# Patient Record
Sex: FEMALE | Race: ASIAN | NOT HISPANIC OR LATINO
[De-identification: names, ages, dates, MRNs, and addresses within clinical notes are randomized per-mention and may not be internally consistent; named-entity substitution may affect disease eponyms.]

---

## 2018-05-03 PROBLEM — Z00.00 ENCOUNTER FOR PREVENTIVE HEALTH EXAMINATION: Status: ACTIVE | Noted: 2018-05-03

## 2018-06-07 ENCOUNTER — NON-APPOINTMENT (OUTPATIENT)
Age: 63
End: 2018-06-07

## 2018-06-07 ENCOUNTER — APPOINTMENT (OUTPATIENT)
Dept: CARDIOLOGY | Facility: CLINIC | Age: 63
End: 2018-06-07
Payer: COMMERCIAL

## 2018-06-07 VITALS
HEIGHT: 64 IN | WEIGHT: 175 LBS | OXYGEN SATURATION: 98 % | BODY MASS INDEX: 29.88 KG/M2 | HEART RATE: 89 BPM | DIASTOLIC BLOOD PRESSURE: 80 MMHG | SYSTOLIC BLOOD PRESSURE: 124 MMHG

## 2018-06-07 DIAGNOSIS — E11.9 TYPE 2 DIABETES MELLITUS W/OUT COMPLICATIONS: ICD-10-CM

## 2018-06-07 DIAGNOSIS — Z82.49 FAMILY HISTORY OF ISCHEMIC HEART DISEASE AND OTHER DISEASES OF THE CIRCULATORY SYSTEM: ICD-10-CM

## 2018-06-07 DIAGNOSIS — I49.9 CARDIAC ARRHYTHMIA, UNSPECIFIED: ICD-10-CM

## 2018-06-07 DIAGNOSIS — R06.02 SHORTNESS OF BREATH: ICD-10-CM

## 2018-06-07 DIAGNOSIS — Z83.3 FAMILY HISTORY OF DIABETES MELLITUS: ICD-10-CM

## 2018-06-07 PROCEDURE — 99204 OFFICE O/P NEW MOD 45 MIN: CPT

## 2018-06-07 PROCEDURE — 93000 ELECTROCARDIOGRAM COMPLETE: CPT

## 2018-06-26 ENCOUNTER — FORM ENCOUNTER (OUTPATIENT)
Age: 63
End: 2018-06-26

## 2018-06-27 ENCOUNTER — OUTPATIENT (OUTPATIENT)
Dept: OUTPATIENT SERVICES | Facility: HOSPITAL | Age: 63
LOS: 1 days | End: 2018-06-27
Payer: COMMERCIAL

## 2018-06-27 DIAGNOSIS — R06.02 SHORTNESS OF BREATH: ICD-10-CM

## 2018-06-27 PROCEDURE — 93306 TTE W/DOPPLER COMPLETE: CPT | Mod: 26

## 2018-06-27 PROCEDURE — 93306 TTE W/DOPPLER COMPLETE: CPT

## 2018-07-16 ENCOUNTER — APPOINTMENT (OUTPATIENT)
Dept: CARDIOLOGY | Facility: CLINIC | Age: 63
End: 2018-07-16
Payer: COMMERCIAL

## 2018-07-16 PROCEDURE — 93015 CV STRESS TEST SUPVJ I&R: CPT

## 2018-07-30 ENCOUNTER — MEDICATION RENEWAL (OUTPATIENT)
Age: 63
End: 2018-07-30

## 2018-10-25 PROBLEM — Z00.00 ENCOUNTER FOR PREVENTIVE HEALTH EXAMINATION: Noted: 2018-10-25

## 2018-11-07 ENCOUNTER — APPOINTMENT (OUTPATIENT)
Dept: SURGERY | Facility: CLINIC | Age: 63
End: 2018-11-07
Payer: MEDICAID

## 2018-11-07 VITALS
HEIGHT: 60 IN | DIASTOLIC BLOOD PRESSURE: 88 MMHG | OXYGEN SATURATION: 97 % | WEIGHT: 120 LBS | RESPIRATION RATE: 16 BRPM | SYSTOLIC BLOOD PRESSURE: 157 MMHG | BODY MASS INDEX: 23.56 KG/M2 | HEART RATE: 87 BPM | TEMPERATURE: 98 F

## 2018-11-07 DIAGNOSIS — I10 ESSENTIAL (PRIMARY) HYPERTENSION: ICD-10-CM

## 2018-11-07 DIAGNOSIS — N32.81 OVERACTIVE BLADDER: ICD-10-CM

## 2018-11-07 DIAGNOSIS — E11.9 TYPE 2 DIABETES MELLITUS W/OUT COMPLICATIONS: ICD-10-CM

## 2018-11-07 DIAGNOSIS — E78.00 PURE HYPERCHOLESTEROLEMIA, UNSPECIFIED: ICD-10-CM

## 2018-11-07 DIAGNOSIS — K21.9 GASTRO-ESOPHAGEAL REFLUX DISEASE W/OUT ESOPHAGITIS: ICD-10-CM

## 2018-11-07 DIAGNOSIS — J45.909 UNSPECIFIED ASTHMA, UNCOMPLICATED: ICD-10-CM

## 2018-11-07 DIAGNOSIS — Z78.9 OTHER SPECIFIED HEALTH STATUS: ICD-10-CM

## 2018-11-07 PROCEDURE — 99204 OFFICE O/P NEW MOD 45 MIN: CPT

## 2018-11-07 RX ORDER — ALBUTEROL 90 MCG
90 AEROSOL (GRAM) INHALATION
Refills: 0 | Status: ACTIVE | COMMUNITY

## 2018-11-07 RX ORDER — ATORVASTATIN CALCIUM 10 MG/1
10 TABLET, FILM COATED ORAL
Refills: 0 | Status: ACTIVE | COMMUNITY

## 2018-11-07 RX ORDER — OXYBUTYNIN CHLORIDE 10 MG/1
10 TABLET, EXTENDED RELEASE ORAL
Refills: 0 | Status: ACTIVE | COMMUNITY

## 2018-11-07 RX ORDER — GLIPIZIDE 10 MG/1
10 TABLET ORAL
Refills: 0 | Status: ACTIVE | COMMUNITY

## 2018-11-07 RX ORDER — FAMOTIDINE 20 MG/1
20 TABLET, FILM COATED ORAL
Refills: 0 | Status: ACTIVE | COMMUNITY

## 2018-11-07 RX ORDER — METOPROLOL SUCCINATE 100 MG/1
100 TABLET, EXTENDED RELEASE ORAL
Refills: 0 | Status: ACTIVE | COMMUNITY

## 2018-11-18 PROBLEM — I10 HYPERTENSION: Status: ACTIVE | Noted: 2018-11-07

## 2018-11-18 PROBLEM — E11.9 DIABETES MELLITUS, TYPE 2: Status: ACTIVE | Noted: 2018-11-07

## 2018-11-18 PROBLEM — K21.9 GERD (GASTROESOPHAGEAL REFLUX DISEASE): Status: ACTIVE | Noted: 2018-11-07

## 2018-11-18 PROBLEM — E78.00 HIGH CHOLESTEROL: Status: ACTIVE | Noted: 2018-11-07

## 2018-12-03 ENCOUNTER — OUTPATIENT (OUTPATIENT)
Dept: OUTPATIENT SERVICES | Facility: HOSPITAL | Age: 63
LOS: 1 days | End: 2018-12-03
Payer: MEDICAID

## 2018-12-03 VITALS
WEIGHT: 126.99 LBS | SYSTOLIC BLOOD PRESSURE: 154 MMHG | HEART RATE: 80 BPM | OXYGEN SATURATION: 97 % | RESPIRATION RATE: 18 BRPM | HEIGHT: 57 IN | DIASTOLIC BLOOD PRESSURE: 92 MMHG | TEMPERATURE: 98 F

## 2018-12-03 DIAGNOSIS — Z01.818 ENCOUNTER FOR OTHER PREPROCEDURAL EXAMINATION: ICD-10-CM

## 2018-12-03 DIAGNOSIS — E11.9 TYPE 2 DIABETES MELLITUS WITHOUT COMPLICATIONS: ICD-10-CM

## 2018-12-03 DIAGNOSIS — K80.20 CALCULUS OF GALLBLADDER WITHOUT CHOLECYSTITIS WITHOUT OBSTRUCTION: ICD-10-CM

## 2018-12-03 DIAGNOSIS — I10 ESSENTIAL (PRIMARY) HYPERTENSION: ICD-10-CM

## 2018-12-03 LAB
ALBUMIN SERPL ELPH-MCNC: 3.9 G/DL — SIGNIFICANT CHANGE UP (ref 3.3–5)
ALP SERPL-CCNC: 59 U/L — SIGNIFICANT CHANGE UP (ref 40–120)
ALT FLD-CCNC: 15 U/L — SIGNIFICANT CHANGE UP (ref 10–45)
ANION GAP SERPL CALC-SCNC: 14 MMOL/L — SIGNIFICANT CHANGE UP (ref 5–17)
AST SERPL-CCNC: 17 U/L — SIGNIFICANT CHANGE UP (ref 10–40)
BILIRUB SERPL-MCNC: 0.5 MG/DL — SIGNIFICANT CHANGE UP (ref 0.2–1.2)
BUN SERPL-MCNC: 15 MG/DL — SIGNIFICANT CHANGE UP (ref 7–23)
CALCIUM SERPL-MCNC: 9.5 MG/DL — SIGNIFICANT CHANGE UP (ref 8.4–10.5)
CHLORIDE SERPL-SCNC: 101 MMOL/L — SIGNIFICANT CHANGE UP (ref 96–108)
CO2 SERPL-SCNC: 26 MMOL/L — SIGNIFICANT CHANGE UP (ref 22–31)
CREAT SERPL-MCNC: 0.96 MG/DL — SIGNIFICANT CHANGE UP (ref 0.5–1.3)
GLUCOSE SERPL-MCNC: 154 MG/DL — HIGH (ref 70–99)
HCT VFR BLD CALC: 36.6 % — SIGNIFICANT CHANGE UP (ref 34.5–45)
HGB BLD-MCNC: 12.1 G/DL — SIGNIFICANT CHANGE UP (ref 11.5–15.5)
MCHC RBC-ENTMCNC: 27.5 PG — SIGNIFICANT CHANGE UP (ref 27–34)
MCHC RBC-ENTMCNC: 33.1 GM/DL — SIGNIFICANT CHANGE UP (ref 32–36)
MCV RBC AUTO: 83.2 FL — SIGNIFICANT CHANGE UP (ref 80–100)
PLATELET # BLD AUTO: 244 K/UL — SIGNIFICANT CHANGE UP (ref 150–400)
POTASSIUM SERPL-MCNC: 4 MMOL/L — SIGNIFICANT CHANGE UP (ref 3.5–5.3)
POTASSIUM SERPL-SCNC: 4 MMOL/L — SIGNIFICANT CHANGE UP (ref 3.5–5.3)
PROT SERPL-MCNC: 7.9 G/DL — SIGNIFICANT CHANGE UP (ref 6–8.3)
RBC # BLD: 4.4 M/UL — SIGNIFICANT CHANGE UP (ref 3.8–5.2)
RBC # FLD: 12.9 % — SIGNIFICANT CHANGE UP (ref 10.3–14.5)
SODIUM SERPL-SCNC: 141 MMOL/L — SIGNIFICANT CHANGE UP (ref 135–145)
WBC # BLD: 7.98 K/UL — SIGNIFICANT CHANGE UP (ref 3.8–10.5)
WBC # FLD AUTO: 7.98 K/UL — SIGNIFICANT CHANGE UP (ref 3.8–10.5)

## 2018-12-03 PROCEDURE — G0463: CPT

## 2018-12-03 PROCEDURE — 93010 ELECTROCARDIOGRAM REPORT: CPT

## 2018-12-03 PROCEDURE — 83036 HEMOGLOBIN GLYCOSYLATED A1C: CPT

## 2018-12-03 PROCEDURE — 80053 COMPREHEN METABOLIC PANEL: CPT

## 2018-12-03 PROCEDURE — 85027 COMPLETE CBC AUTOMATED: CPT

## 2018-12-03 PROCEDURE — 93005 ELECTROCARDIOGRAM TRACING: CPT

## 2018-12-03 NOTE — H&P PST ADULT - PROBLEM SELECTOR PLAN 2
Pt scheduled for sx on 5/18/2018. Surgical and Chlorhexidine instructions reviewed w/ pt and daughter.

## 2018-12-03 NOTE — H&P PST ADULT - NSANTHOSAYNRD_GEN_A_CORE
No. SHEILA screening performed.  STOP BANG Legend: 0-2 = LOW Risk; 3-4 = INTERMEDIATE Risk; 5-8 = HIGH Risk

## 2018-12-03 NOTE — H&P PST ADULT - HISTORY OF PRESENT ILLNESS
63 yr old female presents to PST for a Laparoscopic Cholecystectomy on 12/18/2018. CT on 8/18/2018 showed 1.6cm calcified gallstone w/ no biliary dilatation and B/L lung nodules. Chest CT on 10/15/2018 also showed mediastinal lymphadenopathy. Pt to follow-up after cholecystectomy. Medical evaluation completed today 12/3/2018. Pt denies SOB or Chest pain and feels well otherwise. 63 yr old female Sara Speaking presents to [facility] for a Laparoscopic Cholecystectomy on 12/18/2018. CT on 8/18/2018 showed 1.6cm calcified gallstone w/ no biliary dilatation and B/L lung nodules. Chest CT on 10/15/2018 also showed mediastinal lymphadenopathy- pt to follow-up after cholecystectomy. Medical evaluation completed today 12/3/2018. PMH: HTN, HLD, GERD, Asthma and DM2. Pt denies SOB or Chest pain and feels well otherwise.

## 2018-12-03 NOTE — H&P PST ADULT - PMH
Asthma  Exacerbates in the winter  DM2 (diabetes mellitus, type 2)    GERD (gastroesophageal reflux disease)    HLD (hyperlipidemia)    HTN (hypertension) Asthma  Exacerbates in the winter  Calculus of gallbladder without cholecystitis without obstruction    DM2 (diabetes mellitus, type 2)    GERD (gastroesophageal reflux disease)    HLD (hyperlipidemia)    HTN (hypertension)

## 2018-12-04 LAB — HBA1C BLD-MCNC: 6.5 % — HIGH (ref 4–5.6)

## 2018-12-17 ENCOUNTER — TRANSCRIPTION ENCOUNTER (OUTPATIENT)
Age: 63
End: 2018-12-17

## 2018-12-18 ENCOUNTER — OUTPATIENT (OUTPATIENT)
Dept: OUTPATIENT SERVICES | Facility: HOSPITAL | Age: 63
LOS: 1 days | End: 2018-12-18
Payer: MEDICAID

## 2018-12-18 ENCOUNTER — APPOINTMENT (OUTPATIENT)
Dept: SURGERY | Facility: HOSPITAL | Age: 63
End: 2018-12-18
Payer: MEDICAID

## 2018-12-18 ENCOUNTER — RESULT REVIEW (OUTPATIENT)
Age: 63
End: 2018-12-18

## 2018-12-18 VITALS
SYSTOLIC BLOOD PRESSURE: 140 MMHG | DIASTOLIC BLOOD PRESSURE: 79 MMHG | OXYGEN SATURATION: 96 % | RESPIRATION RATE: 16 BRPM | HEART RATE: 83 BPM

## 2018-12-18 VITALS
HEART RATE: 84 BPM | TEMPERATURE: 98 F | OXYGEN SATURATION: 99 % | DIASTOLIC BLOOD PRESSURE: 84 MMHG | RESPIRATION RATE: 18 BRPM | HEIGHT: 57 IN | WEIGHT: 126.99 LBS | SYSTOLIC BLOOD PRESSURE: 154 MMHG

## 2018-12-18 DIAGNOSIS — Z01.818 ENCOUNTER FOR OTHER PREPROCEDURAL EXAMINATION: ICD-10-CM

## 2018-12-18 DIAGNOSIS — K80.20 CALCULUS OF GALLBLADDER WITHOUT CHOLECYSTITIS WITHOUT OBSTRUCTION: ICD-10-CM

## 2018-12-18 DIAGNOSIS — Z87.440 PERSONAL HISTORY OF URINARY (TRACT) INFECTIONS: ICD-10-CM

## 2018-12-18 LAB
GLUCOSE BLDC GLUCOMTR-MCNC: 109 MG/DL — HIGH (ref 70–99)
GLUCOSE BLDC GLUCOMTR-MCNC: 155 MG/DL — HIGH (ref 70–99)

## 2018-12-18 PROCEDURE — 47562 LAPAROSCOPIC CHOLECYSTECTOMY: CPT

## 2018-12-18 PROCEDURE — 49585: CPT

## 2018-12-18 PROCEDURE — 88304 TISSUE EXAM BY PATHOLOGIST: CPT | Mod: 26

## 2018-12-18 PROCEDURE — 82962 GLUCOSE BLOOD TEST: CPT

## 2018-12-18 PROCEDURE — 88304 TISSUE EXAM BY PATHOLOGIST: CPT

## 2018-12-18 PROCEDURE — C1889: CPT

## 2018-12-18 RX ORDER — SODIUM CHLORIDE 9 MG/ML
3 INJECTION INTRAMUSCULAR; INTRAVENOUS; SUBCUTANEOUS EVERY 8 HOURS
Qty: 0 | Refills: 0 | Status: DISCONTINUED | OUTPATIENT
Start: 2018-12-18 | End: 2018-12-18

## 2018-12-18 RX ORDER — ONDANSETRON 8 MG/1
4 TABLET, FILM COATED ORAL
Qty: 0 | Refills: 0 | Status: DISCONTINUED | OUTPATIENT
Start: 2018-12-18 | End: 2018-12-19

## 2018-12-18 RX ORDER — HYDROMORPHONE HYDROCHLORIDE 2 MG/ML
0.5 INJECTION INTRAMUSCULAR; INTRAVENOUS; SUBCUTANEOUS
Qty: 0 | Refills: 0 | Status: DISCONTINUED | OUTPATIENT
Start: 2018-12-18 | End: 2018-12-19

## 2018-12-18 RX ORDER — LOSARTAN POTASSIUM 100 MG/1
1 TABLET, FILM COATED ORAL
Qty: 0 | Refills: 0 | COMMUNITY

## 2018-12-18 RX ORDER — CEFAZOLIN SODIUM 1 G
2000 VIAL (EA) INJECTION ONCE
Qty: 0 | Refills: 0 | Status: COMPLETED | OUTPATIENT
Start: 2018-12-18 | End: 2018-12-18

## 2018-12-18 RX ORDER — FAMOTIDINE 10 MG/ML
1 INJECTION INTRAVENOUS
Qty: 0 | Refills: 0 | COMMUNITY

## 2018-12-18 RX ORDER — LIDOCAINE HCL 20 MG/ML
0.2 VIAL (ML) INJECTION ONCE
Qty: 0 | Refills: 0 | Status: DISCONTINUED | OUTPATIENT
Start: 2018-12-18 | End: 2018-12-18

## 2018-12-18 RX ORDER — METOPROLOL TARTRATE 50 MG
1 TABLET ORAL
Qty: 0 | Refills: 0 | COMMUNITY

## 2018-12-18 RX ORDER — OXYCODONE HYDROCHLORIDE 5 MG/1
1 TABLET ORAL
Qty: 12 | Refills: 0 | OUTPATIENT
Start: 2018-12-18 | End: 2018-12-20

## 2018-12-18 RX ORDER — ASPIRIN/CALCIUM CARB/MAGNESIUM 324 MG
1 TABLET ORAL
Qty: 0 | Refills: 0 | COMMUNITY

## 2018-12-18 RX ORDER — OXYBUTYNIN CHLORIDE 5 MG
1 TABLET ORAL
Qty: 0 | Refills: 0 | COMMUNITY

## 2018-12-18 RX ADMIN — SODIUM CHLORIDE 3 MILLILITER(S): 9 INJECTION INTRAMUSCULAR; INTRAVENOUS; SUBCUTANEOUS at 11:17

## 2018-12-18 RX ADMIN — HYDROMORPHONE HYDROCHLORIDE 0.5 MILLIGRAM(S): 2 INJECTION INTRAMUSCULAR; INTRAVENOUS; SUBCUTANEOUS at 20:28

## 2018-12-18 RX ADMIN — ONDANSETRON 4 MILLIGRAM(S): 8 TABLET, FILM COATED ORAL at 21:30

## 2018-12-18 RX ADMIN — HYDROMORPHONE HYDROCHLORIDE 0.5 MILLIGRAM(S): 2 INJECTION INTRAMUSCULAR; INTRAVENOUS; SUBCUTANEOUS at 20:45

## 2018-12-18 NOTE — ASU DISCHARGE PLAN (ADULT/PEDIATRIC). - MEDICATION SUMMARY - MEDICATIONS TO TAKE
I will START or STAY ON the medications listed below when I get home from the hospital:    oxyCODONE 5 mg oral tablet  -- 1 tab(s) by mouth 4 times a day, As Needed -for severe pain MDD:4 tabs  -- Caution federal law prohibits the transfer of this drug to any person other  than the person for whom it was prescribed.  It is very important that you take or use this exactly as directed.  Do not skip doses or discontinue unless directed by your doctor.  May cause drowsiness.  Alcohol may intensify this effect.  Use care when operating dangerous machinery.  This prescription cannot be refilled.  Using more of this medication than prescribed may cause serious breathing problems.    -- Indication: For pain medication    losartan 100 mg oral tablet  -- 1 tab(s) by mouth once a day, in AM  -- Indication: For home medication    glipiZIDE 10 mg oral tablet  -- 1 tab(s) by mouth 2 times a day  -- Indication: For home medication    atorvastatin 10 mg oral tablet  -- 1 tab(s) by mouth once a day, in AM  -- Indication: For home medication    Toprol- mg oral tablet, extended release  -- 1 tab(s) by mouth once a day  -- Indication: For home medication    albuterol 90 mcg/inh inhalation aerosol  -- 1 puff(s) inhaled every 6 hours, As Needed  -- Indication: For home medication    amLODIPine 5 mg oral tablet  -- 1 tab(s) by mouth once a day, in PM  -- Indication: For home medication    famotidine 20 mg oral tablet  -- 1 tab(s) by mouth once a day, in PM  -- Indication: For home medication    oxybutynin 10 mg/24 hr oral tablet, extended release  -- 1 tab(s) by mouth once a day, in PM  -- Indication: For home medication

## 2018-12-18 NOTE — ASU DISCHARGE PLAN (ADULT/PEDIATRIC). - NOTIFY
Increased Irritability or Sluggishness/Bleeding that does not stop/Unable to Urinate/Persistent Nausea and Vomiting/Pain not relieved by Medications/Swelling that continues/Numbness, color, or temperature change to extremity/Numbness, tingling/Inability to Tolerate Liquids or Foods/Excessive Diarrhea/Fever greater than 101

## 2018-12-18 NOTE — BRIEF OPERATIVE NOTE - OPERATION/FINDINGS
laparoscopic cholecystectomy for symptomatic cholelithiasis, cystic duct and artery identified and clipped with 5mm hemlock clips, umbilical hernia noted and repaired primarily with closure

## 2018-12-18 NOTE — ASU DISCHARGE PLAN (ADULT/PEDIATRIC). - MEDICATION SUMMARY - MEDICATIONS TO STOP TAKING
I will STOP taking the medications listed below when I get home from the hospital:    Aspirin Enteric Coated 81 mg oral delayed release tablet  -- 1 tab(s) by mouth once a day, pt to stop 7 days prior to sx as per surgeon

## 2018-12-18 NOTE — BRIEF OPERATIVE NOTE - PROCEDURE
<<-----Click on this checkbox to enter Procedure Umbilical hernia repair  12/18/2018    Active  YENI  Laparoscopic cholecystectomy  12/18/2018    Active  YENI

## 2018-12-19 RX ORDER — OXYCODONE HYDROCHLORIDE 5 MG/1
1 TABLET ORAL
Qty: 12 | Refills: 0
Start: 2018-12-19 | End: 2018-12-21

## 2018-12-20 PROBLEM — E78.5 HYPERLIPIDEMIA, UNSPECIFIED: Chronic | Status: ACTIVE | Noted: 2018-12-03

## 2018-12-20 PROBLEM — E11.9 TYPE 2 DIABETES MELLITUS WITHOUT COMPLICATIONS: Chronic | Status: ACTIVE | Noted: 2018-12-03

## 2018-12-20 PROBLEM — K21.9 GASTRO-ESOPHAGEAL REFLUX DISEASE WITHOUT ESOPHAGITIS: Chronic | Status: ACTIVE | Noted: 2018-12-03

## 2018-12-20 PROBLEM — I10 ESSENTIAL (PRIMARY) HYPERTENSION: Chronic | Status: ACTIVE | Noted: 2018-12-03

## 2018-12-20 PROBLEM — K80.20 CALCULUS OF GALLBLADDER WITHOUT CHOLECYSTITIS WITHOUT OBSTRUCTION: Chronic | Status: ACTIVE | Noted: 2018-12-03

## 2018-12-20 PROBLEM — J45.909 UNSPECIFIED ASTHMA, UNCOMPLICATED: Chronic | Status: ACTIVE | Noted: 2018-12-03

## 2018-12-21 LAB — SURGICAL PATHOLOGY STUDY: SIGNIFICANT CHANGE UP

## 2018-12-30 LAB — BACTERIA UR CULT: ABNORMAL

## 2019-01-09 ENCOUNTER — APPOINTMENT (OUTPATIENT)
Dept: SURGERY | Facility: CLINIC | Age: 64
End: 2019-01-09
Payer: MEDICAID

## 2019-01-09 VITALS
SYSTOLIC BLOOD PRESSURE: 143 MMHG | OXYGEN SATURATION: 98 % | HEART RATE: 83 BPM | RESPIRATION RATE: 16 BRPM | DIASTOLIC BLOOD PRESSURE: 82 MMHG | TEMPERATURE: 98.1 F

## 2019-01-09 DIAGNOSIS — Z09 ENCOUNTER FOR FOLLOW-UP EXAMINATION AFTER COMPLETED TREATMENT FOR CONDITIONS OTHER THAN MALIGNANT NEOPLASM: ICD-10-CM

## 2019-01-09 DIAGNOSIS — K80.10 CALCULUS OF GALLBLADDER WITH CHRONIC CHOLECYSTITIS W/OUT OBSTRUCTION: ICD-10-CM

## 2019-01-09 PROCEDURE — 99024 POSTOP FOLLOW-UP VISIT: CPT

## 2019-01-09 NOTE — CONSULT LETTER
[Dear  ___] : Dear  [unfilled], [Courtesy Letter:] : I had the pleasure of seeing your patient, [unfilled], in my office today. [Please see my note below.] : Please see my note below. [Consult Closing:] : Thank you very much for allowing me to participate in the care of this patient.  If you have any questions, please do not hesitate to contact me. [Sincerely,] : Sincerely, [FreeTextEntry2] : MOSES Quintero [FreeTextEntry1] : She is recovering well from her laparoscopic cholecystectomy. My office will send you her Operation and Pathology report for your records. \par \par  [FreeTextEntry3] : Stacy Bledsoe M.D., F.A.C.S., F.GUNNAR.S.C.R.S.\par Assistant Professor of Surgery\par [employer] [facility]\par \par

## 2019-01-09 NOTE — HISTORY OF PRESENT ILLNESS
[de-identified] : Berto is a 62 y/o female here for a post op visit. Feels well. Tolerating po w/o issues. BMs once a day. No diarrhea or rapid transit. Had UTI recently treated with Bactrim, no  symptoms currently.

## 2019-01-09 NOTE — REASON FOR VISIT
[Post Op: _________] : a [unfilled] post op visit [FreeTextEntry1] : Laparoscopic cholecystectomy and supraumbilical hernia repair 12/18/18

## 2019-01-09 NOTE — ASSESSMENT
[FreeTextEntry1] : Doing well postop.\par Path discussed. \par Instructions for diet, activity, wound care given, all questions answered.\par RTO as needed.\par \par

## 2019-06-06 ENCOUNTER — APPOINTMENT (OUTPATIENT)
Dept: UROLOGY | Facility: CLINIC | Age: 64
End: 2019-06-06

## 2020-01-08 ENCOUNTER — APPOINTMENT (OUTPATIENT)
Dept: RHEUMATOLOGY | Facility: CLINIC | Age: 65
End: 2020-01-08

## 2020-03-06 ENCOUNTER — APPOINTMENT (OUTPATIENT)
Dept: UROLOGY | Facility: CLINIC | Age: 65
End: 2020-03-06

## 2020-09-09 ENCOUNTER — APPOINTMENT (OUTPATIENT)
Dept: RHEUMATOLOGY | Facility: CLINIC | Age: 65
End: 2020-09-09

## 2020-12-11 ENCOUNTER — APPOINTMENT (OUTPATIENT)
Dept: RHEUMATOLOGY | Facility: CLINIC | Age: 65
End: 2020-12-11
Payer: MEDICAID

## 2020-12-11 VITALS — HEART RATE: 51 BPM | DIASTOLIC BLOOD PRESSURE: 82 MMHG | SYSTOLIC BLOOD PRESSURE: 131 MMHG

## 2020-12-11 PROCEDURE — 99072 ADDL SUPL MATRL&STAF TM PHE: CPT

## 2020-12-11 PROCEDURE — 99205 OFFICE O/P NEW HI 60 MIN: CPT

## 2020-12-11 RX ORDER — LOSARTAN POTASSIUM 100 MG/1
100 TABLET, FILM COATED ORAL
Refills: 0 | Status: COMPLETED | COMMUNITY
End: 2020-12-11

## 2020-12-11 RX ORDER — ROSUVASTATIN CALCIUM 5 MG/1
5 TABLET, FILM COATED ORAL
Qty: 24 | Refills: 0 | Status: COMPLETED | COMMUNITY
End: 2020-12-11

## 2020-12-11 RX ORDER — SULFAMETHOXAZOLE AND TRIMETHOPRIM 800; 160 MG/1; MG/1
800-160 TABLET ORAL
Qty: 10 | Refills: 0 | Status: COMPLETED | COMMUNITY
Start: 2018-12-21 | End: 2020-12-11

## 2020-12-11 RX ORDER — SITAGLIPTIN AND METFORMIN HYDROCHLORIDE 50; 500 MG/1; MG/1
50-500 TABLET, FILM COATED ORAL
Refills: 0 | Status: COMPLETED | COMMUNITY
End: 2020-12-11

## 2020-12-11 RX ORDER — GABAPENTIN 300 MG/1
300 CAPSULE ORAL
Qty: 30 | Refills: 0 | Status: ACTIVE | COMMUNITY
Start: 2020-12-11

## 2020-12-11 RX ORDER — AMLODIPINE BESYLATE 5 MG/1
5 TABLET ORAL DAILY
Qty: 90 | Refills: 3 | Status: ACTIVE | COMMUNITY
Start: 2020-12-11

## 2020-12-11 RX ORDER — ASPIRIN ENTERIC COATED TABLETS 81 MG 81 MG/1
81 TABLET, DELAYED RELEASE ORAL
Refills: 0 | Status: ACTIVE | COMMUNITY
Start: 2020-12-11

## 2020-12-11 NOTE — HISTORY OF PRESENT ILLNESS
[Arthralgias] : arthralgias [FreeTextEntry1] : 65 year old female with PMHx as listed below reports that she has been experiencing joint pains, including her hands (MCP's, PIP's, and DIP's), wrists, knees and feet for the past 3-5 years.  The pain is intermittent, typically worse at nights and in the mornings. It's also worst after prolonged activity.  She describes the pain as sharp, 6-7 out of 10.  She denies any joint swelling or AM stiffness.  She gets some relief from Tylenol or Advil and hot water or Epsom salt baths.  No other known alleviating factors.\par She also c/o numbness in her hands, also worst in the nights and mornings.  The numbness in worst in her 1st-3rd fingers.  \par No F/C, no unintentional weight loss, no night sweats, no oral ulcers, no rashes, no alopecia, no photosensitivity, no dry eyes/dry mouth, no Raynaud symptoms, no focal weakness, no dysphagia  [Anorexia] : no anorexia [Weight Loss] : no weight loss [Malaise] : no malaise [Fever] : no fever [Chills] : no chills [Fatigue] : no fatigue [Malar Facial Rash] : no malar facial rash [Skin Lesions] : no lesions [Skin Nodules] : no skin nodules [Oral Ulcers] : no oral ulcers [Cough] : no cough [Dry Mouth] : no dry mouth [Dysphonia] : no dysphonia [Dysphagia] : no dysphagia [Shortness of Breath] : no shortness of breath [Chest Pain] : no chest pain [Joint Swelling] : no joint swelling [Joint Warmth] : no joint warmth [Joint Deformity] : no joint deformity [Decreased ROM] : no decreased range of motion [Morning Stiffness] : no morning stiffness [Falls] : no falls [Dyspnea] : no dyspnea [Myalgias] : no myalgias [Muscle Weakness] : no muscle weakness [Muscle Spasms] : no muscle spasms [Muscle Cramping] : no muscle cramping [Visual Changes] : no visual changes [Eye Pain] : no eye pain [Eye Redness] : no eye redness [Dry Eyes] : no dry eyes

## 2020-12-11 NOTE — ASSESSMENT
[FreeTextEntry1] : 65 year old female presents with polyarticular arthritis.  Her overall presentation is most suggestive of osteoarthritis.  However, she c/o pain in her MCP's which is an atypical location for OA.  In addition, her exam reveals several swollen joints, which is also suggestive of a possible concurrent inflammatory arthritis, such as RA.   I have therefore ordered bloodwork and x-rays to confirm OA and rule out other possible etiologies.  She will follow up with me again in 2-3 weeks to review her results.  In the meantime, I have recommended conservative treatment, including exercises, ibuprofen and/or Tylenol prn, warm compresses, and weight loss.\par

## 2020-12-11 NOTE — PHYSICAL EXAM
[General Appearance - Alert] : alert [General Appearance - In No Acute Distress] : in no acute distress [Sclera] : the sclera and conjunctiva were normal [Outer Ear] : the ears and nose were normal in appearance [Oropharynx] : the oropharynx was normal [Neck Appearance] : the appearance of the neck was normal [Neck Cervical Mass (___cm)] : no neck mass was observed [Jugular Venous Distention Increased] : there was no jugular-venous distention [Thyroid Diffuse Enlargement] : the thyroid was not enlarged [Thyroid Nodule] : there were no palpable thyroid nodules [Auscultation Breath Sounds / Voice Sounds] : lungs were clear to auscultation bilaterally [Heart Rate And Rhythm] : heart rate was normal and rhythm regular [Heart Sounds] : normal S1 and S2 [Heart Sounds Gallop] : no gallops [Murmurs] : no murmurs [Heart Sounds Pericardial Friction Rub] : no pericardial rub [Edema] : there was no peripheral edema [Bowel Sounds] : normal bowel sounds [Abdomen Soft] : soft [Abdomen Tenderness] : non-tender [Abdomen Mass (___ Cm)] : no abdominal mass palpated [Cervical Lymph Nodes Enlarged Posterior Bilaterally] : posterior cervical [Cervical Lymph Nodes Enlarged Anterior Bilaterally] : anterior cervical [Supraclavicular Lymph Nodes Enlarged Bilaterally] : supraclavicular [Skin Color & Pigmentation] : normal skin color and pigmentation [Skin Turgor] : normal skin turgor [] : no rash [No Focal Deficits] : no focal deficits [Oriented To Time, Place, And Person] : oriented to person, place, and time [Impaired Insight] : insight and judgment were intact [Affect] : the affect was normal [FreeTextEntry1] : (+)mild non-tender swelling of B/L 2nd-5th MCP's;  (+)tenderness of multiple B/L PIP's and DIP's;  (+)Heberden's and Lindsey's nodes;  B/L ankles w/ (+)swelling, (+)tenderness laterally

## 2020-12-11 NOTE — REASON FOR VISIT
[Initial Evaluation] : an initial evaluation [Family Member] : family member [Patient Declined  Services] : - None: Patient declined  services

## 2020-12-14 LAB
ALBUMIN SERPL ELPH-MCNC: 4.1 G/DL
ALP BLD-CCNC: 64 U/L
ALT SERPL-CCNC: 18 U/L
ANA SER IF-ACNC: NEGATIVE
ANION GAP SERPL CALC-SCNC: 15 MMOL/L
AST SERPL-CCNC: 21 U/L
BASOPHILS # BLD AUTO: 0.05 K/UL
BASOPHILS NFR BLD AUTO: 0.7 %
BILIRUB SERPL-MCNC: 0.6 MG/DL
BUN SERPL-MCNC: 14 MG/DL
CALCIUM SERPL-MCNC: 8.9 MG/DL
CCP AB SER IA-ACNC: <8 UNITS
CHLORIDE SERPL-SCNC: 104 MMOL/L
CO2 SERPL-SCNC: 25 MMOL/L
CREAT SERPL-MCNC: 0.78 MG/DL
CRP SERPL-MCNC: 1.27 MG/DL
ENA SS-A AB SER IA-ACNC: <0.2 AL
ENA SS-B AB SER IA-ACNC: <0.2 AL
EOSINOPHIL # BLD AUTO: 0.38 K/UL
EOSINOPHIL NFR BLD AUTO: 5.1 %
ERYTHROCYTE [SEDIMENTATION RATE] IN BLOOD BY WESTERGREN METHOD: 64 MM/HR
GLUCOSE SERPL-MCNC: 183 MG/DL
HCT VFR BLD CALC: 41.4 %
HGB BLD-MCNC: 13.1 G/DL
IMM GRANULOCYTES NFR BLD AUTO: 0.1 %
LYMPHOCYTES # BLD AUTO: 1.88 K/UL
LYMPHOCYTES NFR BLD AUTO: 25.2 %
MAN DIFF?: NORMAL
MCHC RBC-ENTMCNC: 27.3 PG
MCHC RBC-ENTMCNC: 31.6 GM/DL
MCV RBC AUTO: 86.4 FL
MONOCYTES # BLD AUTO: 0.43 K/UL
MONOCYTES NFR BLD AUTO: 5.8 %
NEUTROPHILS # BLD AUTO: 4.71 K/UL
NEUTROPHILS NFR BLD AUTO: 63.1 %
PLATELET # BLD AUTO: 252 K/UL
POTASSIUM SERPL-SCNC: 4 MMOL/L
PROT SERPL-MCNC: 7.6 G/DL
RBC # BLD: 4.79 M/UL
RBC # FLD: 12.8 %
RF+CCP IGG SER-IMP: NEGATIVE
RHEUMATOID FACT SER QL: <10 IU/ML
SODIUM SERPL-SCNC: 144 MMOL/L
WBC # FLD AUTO: 7.46 K/UL

## 2020-12-16 PROBLEM — Z87.440 HISTORY OF URINARY TRACT INFECTION: Status: RESOLVED | Noted: 2018-12-21 | Resolved: 2020-12-16

## 2021-01-08 ENCOUNTER — INPATIENT (INPATIENT)
Facility: HOSPITAL | Age: 66
LOS: 2 days | Discharge: HOME | DRG: 243 | End: 2021-01-11
Attending: HOSPITALIST | Admitting: STUDENT IN AN ORGANIZED HEALTH CARE EDUCATION/TRAINING PROGRAM
Payer: MEDICAID

## 2021-01-08 ENCOUNTER — EMERGENCY (EMERGENCY)
Facility: HOSPITAL | Age: 66
LOS: 0 days | Discharge: ANOTHER HOSPITAL | End: 2021-01-08
Attending: STUDENT IN AN ORGANIZED HEALTH CARE EDUCATION/TRAINING PROGRAM
Payer: MEDICAID

## 2021-01-08 VITALS
HEART RATE: 38 BPM | SYSTOLIC BLOOD PRESSURE: 170 MMHG | RESPIRATION RATE: 14 BRPM | OXYGEN SATURATION: 96 % | DIASTOLIC BLOOD PRESSURE: 117 MMHG | TEMPERATURE: 97 F

## 2021-01-08 VITALS
SYSTOLIC BLOOD PRESSURE: 163 MMHG | TEMPERATURE: 98 F | HEART RATE: 40 BPM | RESPIRATION RATE: 16 BRPM | OXYGEN SATURATION: 100 % | WEIGHT: 132.94 LBS | HEIGHT: 61 IN | DIASTOLIC BLOOD PRESSURE: 76 MMHG

## 2021-01-08 VITALS
DIASTOLIC BLOOD PRESSURE: 60 MMHG | HEIGHT: 57 IN | RESPIRATION RATE: 18 BRPM | OXYGEN SATURATION: 97 % | HEART RATE: 37 BPM | TEMPERATURE: 98 F | WEIGHT: 179.9 LBS | SYSTOLIC BLOOD PRESSURE: 153 MMHG

## 2021-01-08 DIAGNOSIS — E87.5 HYPERKALEMIA: ICD-10-CM

## 2021-01-08 DIAGNOSIS — I44.2 ATRIOVENTRICULAR BLOCK, COMPLETE: ICD-10-CM

## 2021-01-08 DIAGNOSIS — E87.1 HYPO-OSMOLALITY AND HYPONATREMIA: ICD-10-CM

## 2021-01-08 DIAGNOSIS — R53.1 WEAKNESS: ICD-10-CM

## 2021-01-08 DIAGNOSIS — R42 DIZZINESS AND GIDDINESS: ICD-10-CM

## 2021-01-08 DIAGNOSIS — I10 ESSENTIAL (PRIMARY) HYPERTENSION: ICD-10-CM

## 2021-01-08 DIAGNOSIS — K21.9 GASTRO-ESOPHAGEAL REFLUX DISEASE WITHOUT ESOPHAGITIS: ICD-10-CM

## 2021-01-08 DIAGNOSIS — R00.9 UNSPECIFIED ABNORMALITIES OF HEART BEAT: ICD-10-CM

## 2021-01-08 DIAGNOSIS — J45.909 UNSPECIFIED ASTHMA, UNCOMPLICATED: ICD-10-CM

## 2021-01-08 DIAGNOSIS — Z79.82 LONG TERM (CURRENT) USE OF ASPIRIN: ICD-10-CM

## 2021-01-08 DIAGNOSIS — E87.2 ACIDOSIS: ICD-10-CM

## 2021-01-08 DIAGNOSIS — E78.5 HYPERLIPIDEMIA, UNSPECIFIED: ICD-10-CM

## 2021-01-08 DIAGNOSIS — E11.9 TYPE 2 DIABETES MELLITUS WITHOUT COMPLICATIONS: ICD-10-CM

## 2021-01-08 LAB
A1C WITH ESTIMATED AVERAGE GLUCOSE RESULT: 6.8 % — HIGH (ref 4–5.6)
ALBUMIN SERPL ELPH-MCNC: 3.2 G/DL — LOW (ref 3.3–5)
ALBUMIN SERPL ELPH-MCNC: 3.4 G/DL — SIGNIFICANT CHANGE UP (ref 3.3–5)
ALP SERPL-CCNC: 60 U/L — SIGNIFICANT CHANGE UP (ref 40–120)
ALP SERPL-CCNC: 64 U/L — SIGNIFICANT CHANGE UP (ref 40–120)
ALT FLD-CCNC: 64 U/L — HIGH (ref 10–45)
ALT FLD-CCNC: 67 U/L — HIGH (ref 10–45)
ANION GAP SERPL CALC-SCNC: 10 MMOL/L — SIGNIFICANT CHANGE UP (ref 5–17)
ANION GAP SERPL CALC-SCNC: 12 MMOL/L — SIGNIFICANT CHANGE UP (ref 5–17)
ANION GAP SERPL CALC-SCNC: 18 MMOL/L — HIGH (ref 5–17)
APTT BLD: 26.2 SEC — LOW (ref 27.5–35.5)
APTT BLD: 28.6 SEC — SIGNIFICANT CHANGE UP (ref 27.5–35.5)
AST SERPL-CCNC: 37 U/L — SIGNIFICANT CHANGE UP (ref 10–40)
AST SERPL-CCNC: 42 U/L — HIGH (ref 10–40)
B BURGDOR C6 AB SER-ACNC: NEGATIVE — SIGNIFICANT CHANGE UP
B BURGDOR IGG+IGM SER-ACNC: 0.62 INDEX — SIGNIFICANT CHANGE UP (ref 0.01–0.89)
BASE EXCESS BLDV CALC-SCNC: -8.3 MMOL/L — LOW (ref -2–2)
BASOPHILS # BLD AUTO: 0.04 K/UL — SIGNIFICANT CHANGE UP (ref 0–0.2)
BASOPHILS # BLD AUTO: 0.04 K/UL — SIGNIFICANT CHANGE UP (ref 0–0.2)
BASOPHILS NFR BLD AUTO: 0.4 % — SIGNIFICANT CHANGE UP (ref 0–2)
BASOPHILS NFR BLD AUTO: 0.4 % — SIGNIFICANT CHANGE UP (ref 0–2)
BILIRUB SERPL-MCNC: 0.7 MG/DL — SIGNIFICANT CHANGE UP (ref 0.2–1.2)
BILIRUB SERPL-MCNC: 0.8 MG/DL — SIGNIFICANT CHANGE UP (ref 0.2–1.2)
BLD GP AB SCN SERPL QL: NEGATIVE — SIGNIFICANT CHANGE UP
BUN SERPL-MCNC: 26 MG/DL — HIGH (ref 7–23)
BUN SERPL-MCNC: 26 MG/DL — HIGH (ref 7–23)
BUN SERPL-MCNC: 29 MG/DL — HIGH (ref 7–23)
CA-I SERPL-SCNC: 1.11 MMOL/L — LOW (ref 1.12–1.3)
CALCIUM SERPL-MCNC: 7.5 MG/DL — LOW (ref 8.4–10.5)
CALCIUM SERPL-MCNC: 7.7 MG/DL — LOW (ref 8.4–10.5)
CALCIUM SERPL-MCNC: 7.9 MG/DL — LOW (ref 8.4–10.5)
CHLORIDE BLDV-SCNC: 91 MMOL/L — LOW (ref 96–108)
CHLORIDE SERPL-SCNC: 86 MMOL/L — LOW (ref 96–108)
CHOLEST SERPL-MCNC: 80 MG/DL — SIGNIFICANT CHANGE UP
CO2 BLDV-SCNC: 21 MMOL/L — LOW (ref 22–30)
CO2 SERPL-SCNC: 15 MMOL/L — LOW (ref 22–31)
CO2 SERPL-SCNC: 19 MMOL/L — LOW (ref 22–31)
CO2 SERPL-SCNC: 19 MMOL/L — LOW (ref 22–31)
CREAT ?TM UR-MCNC: 119 MG/DL — SIGNIFICANT CHANGE UP
CREAT SERPL-MCNC: 0.88 MG/DL — SIGNIFICANT CHANGE UP (ref 0.5–1.3)
CREAT SERPL-MCNC: 0.94 MG/DL — SIGNIFICANT CHANGE UP (ref 0.5–1.3)
CREAT SERPL-MCNC: 0.99 MG/DL — SIGNIFICANT CHANGE UP (ref 0.5–1.3)
EOSINOPHIL # BLD AUTO: 0.02 K/UL — SIGNIFICANT CHANGE UP (ref 0–0.5)
EOSINOPHIL # BLD AUTO: 0.04 K/UL — SIGNIFICANT CHANGE UP (ref 0–0.5)
EOSINOPHIL NFR BLD AUTO: 0.2 % — SIGNIFICANT CHANGE UP (ref 0–6)
EOSINOPHIL NFR BLD AUTO: 0.4 % — SIGNIFICANT CHANGE UP (ref 0–6)
ESTIMATED AVERAGE GLUCOSE: 148 MG/DL — HIGH (ref 68–114)
GAS PNL BLDV: 115 MMOL/L — CRITICAL LOW (ref 135–145)
GAS PNL BLDV: SIGNIFICANT CHANGE UP
GLUCOSE BLDC GLUCOMTR-MCNC: 145 MG/DL — HIGH (ref 70–99)
GLUCOSE BLDC GLUCOMTR-MCNC: 223 MG/DL — HIGH (ref 70–99)
GLUCOSE BLDC GLUCOMTR-MCNC: 264 MG/DL — HIGH (ref 70–99)
GLUCOSE BLDV-MCNC: 239 MG/DL — HIGH (ref 70–99)
GLUCOSE SERPL-MCNC: 212 MG/DL — HIGH (ref 70–99)
GLUCOSE SERPL-MCNC: 230 MG/DL — HIGH (ref 70–99)
GLUCOSE SERPL-MCNC: 230 MG/DL — HIGH (ref 70–99)
HCO3 BLDV-SCNC: 20 MMOL/L — LOW (ref 21–29)
HCT VFR BLD CALC: 33.6 % — LOW (ref 34.5–45)
HCT VFR BLD CALC: 34.1 % — LOW (ref 34.5–45)
HCT VFR BLD CALC: 34.6 % — SIGNIFICANT CHANGE UP (ref 34.5–45)
HCT VFR BLDA CALC: 38 % — LOW (ref 39–50)
HDLC SERPL-MCNC: 36 MG/DL — LOW
HGB BLD CALC-MCNC: 12.5 G/DL — SIGNIFICANT CHANGE UP (ref 11.5–15.5)
HGB BLD-MCNC: 11.3 G/DL — LOW (ref 11.5–15.5)
HGB BLD-MCNC: 11.4 G/DL — LOW (ref 11.5–15.5)
HGB BLD-MCNC: 11.5 G/DL — SIGNIFICANT CHANGE UP (ref 11.5–15.5)
HOROWITZ INDEX BLDV+IHG-RTO: 21 — SIGNIFICANT CHANGE UP
IMM GRANULOCYTES NFR BLD AUTO: 0.3 % — SIGNIFICANT CHANGE UP (ref 0–1.5)
IMM GRANULOCYTES NFR BLD AUTO: 0.5 % — SIGNIFICANT CHANGE UP (ref 0–1.5)
INR BLD: 1.07 RATIO — SIGNIFICANT CHANGE UP (ref 0.88–1.16)
INR BLD: 1.11 RATIO — SIGNIFICANT CHANGE UP (ref 0.88–1.16)
LACTATE BLDV-MCNC: 3.8 MMOL/L — HIGH (ref 0.7–2)
LACTATE SERPL-SCNC: 1.9 MMOL/L — SIGNIFICANT CHANGE UP (ref 0.7–2)
LIPID PNL WITH DIRECT LDL SERPL: 27 MG/DL — SIGNIFICANT CHANGE UP
LYMPHOCYTES # BLD AUTO: 0.96 K/UL — LOW (ref 1–3.3)
LYMPHOCYTES # BLD AUTO: 1.13 K/UL — SIGNIFICANT CHANGE UP (ref 1–3.3)
LYMPHOCYTES # BLD AUTO: 12.7 % — LOW (ref 13–44)
LYMPHOCYTES # BLD AUTO: 9.8 % — LOW (ref 13–44)
MAGNESIUM SERPL-MCNC: 1.9 MG/DL — SIGNIFICANT CHANGE UP (ref 1.6–2.6)
MAGNESIUM SERPL-MCNC: 2 MG/DL — SIGNIFICANT CHANGE UP (ref 1.6–2.6)
MCHC RBC-ENTMCNC: 26.9 PG — LOW (ref 27–34)
MCHC RBC-ENTMCNC: 27.4 PG — SIGNIFICANT CHANGE UP (ref 27–34)
MCHC RBC-ENTMCNC: 28.1 PG — SIGNIFICANT CHANGE UP (ref 27–34)
MCHC RBC-ENTMCNC: 32.9 GM/DL — SIGNIFICANT CHANGE UP (ref 32–36)
MCHC RBC-ENTMCNC: 33.1 GM/DL — SIGNIFICANT CHANGE UP (ref 32–36)
MCHC RBC-ENTMCNC: 34.2 GM/DL — SIGNIFICANT CHANGE UP (ref 32–36)
MCV RBC AUTO: 81.6 FL — SIGNIFICANT CHANGE UP (ref 80–100)
MCV RBC AUTO: 82.2 FL — SIGNIFICANT CHANGE UP (ref 80–100)
MCV RBC AUTO: 82.6 FL — SIGNIFICANT CHANGE UP (ref 80–100)
MONOCYTES # BLD AUTO: 0.38 K/UL — SIGNIFICANT CHANGE UP (ref 0–0.9)
MONOCYTES # BLD AUTO: 0.44 K/UL — SIGNIFICANT CHANGE UP (ref 0–0.9)
MONOCYTES NFR BLD AUTO: 4.3 % — SIGNIFICANT CHANGE UP (ref 2–14)
MONOCYTES NFR BLD AUTO: 4.5 % — SIGNIFICANT CHANGE UP (ref 2–14)
NEUTROPHILS # BLD AUTO: 7.28 K/UL — SIGNIFICANT CHANGE UP (ref 1.8–7.4)
NEUTROPHILS # BLD AUTO: 8.24 K/UL — HIGH (ref 1.8–7.4)
NEUTROPHILS NFR BLD AUTO: 81.9 % — HIGH (ref 43–77)
NEUTROPHILS NFR BLD AUTO: 84.6 % — HIGH (ref 43–77)
NON HDL CHOLESTEROL: 45 MG/DL — SIGNIFICANT CHANGE UP
NRBC # BLD: 0 /100 WBCS — SIGNIFICANT CHANGE UP (ref 0–0)
NT-PROBNP SERPL-SCNC: 6076 PG/ML — HIGH (ref 0–300)
OSMOLALITY SERPL: 262 MOSMOL/KG — LOW (ref 280–301)
OSMOLALITY UR: 395 MOS/KG — SIGNIFICANT CHANGE UP (ref 300–900)
OTHER CELLS CSF MANUAL: 10 ML/DL — LOW (ref 18–22)
PCO2 BLDV: 53 MMHG — HIGH (ref 35–50)
PH BLDV: 7.19 — CRITICAL LOW (ref 7.35–7.45)
PHOSPHATE SERPL-MCNC: 3.5 MG/DL — SIGNIFICANT CHANGE UP (ref 2.5–4.5)
PHOSPHATE SERPL-MCNC: 3.9 MG/DL — SIGNIFICANT CHANGE UP (ref 2.5–4.5)
PLATELET # BLD AUTO: 173 K/UL — SIGNIFICANT CHANGE UP (ref 150–400)
PLATELET # BLD AUTO: 180 K/UL — SIGNIFICANT CHANGE UP (ref 150–400)
PLATELET # BLD AUTO: 181 K/UL — SIGNIFICANT CHANGE UP (ref 150–400)
PO2 BLDV: 40 MMHG — SIGNIFICANT CHANGE UP (ref 25–45)
POTASSIUM BLDV-SCNC: 4.7 MMOL/L — SIGNIFICANT CHANGE UP (ref 3.5–5.3)
POTASSIUM SERPL-MCNC: 5.6 MMOL/L — HIGH (ref 3.5–5.3)
POTASSIUM SERPL-SCNC: 5.6 MMOL/L — HIGH (ref 3.5–5.3)
PROT SERPL-MCNC: 6.3 G/DL — SIGNIFICANT CHANGE UP (ref 6–8.3)
PROT SERPL-MCNC: 6.7 G/DL — SIGNIFICANT CHANGE UP (ref 6–8.3)
PROTHROM AB SERPL-ACNC: 12.8 SEC — SIGNIFICANT CHANGE UP (ref 10.6–13.6)
PROTHROM AB SERPL-ACNC: 12.8 SEC — SIGNIFICANT CHANGE UP (ref 10.6–13.6)
RBC # BLD: 4.09 M/UL — SIGNIFICANT CHANGE UP (ref 3.8–5.2)
RBC # BLD: 4.13 M/UL — SIGNIFICANT CHANGE UP (ref 3.8–5.2)
RBC # BLD: 4.24 M/UL — SIGNIFICANT CHANGE UP (ref 3.8–5.2)
RBC # FLD: 12.2 % — SIGNIFICANT CHANGE UP (ref 10.3–14.5)
RBC # FLD: 12.3 % — SIGNIFICANT CHANGE UP (ref 10.3–14.5)
RBC # FLD: 12.4 % — SIGNIFICANT CHANGE UP (ref 10.3–14.5)
RH IG SCN BLD-IMP: POSITIVE — SIGNIFICANT CHANGE UP
SAO2 % BLDV: 58 % — LOW (ref 67–88)
SARS-COV-2 RNA SPEC QL NAA+PROBE: SIGNIFICANT CHANGE UP
SARS-COV-2 RNA SPEC QL NAA+PROBE: SIGNIFICANT CHANGE UP
SODIUM SERPL-SCNC: 115 MMOL/L — CRITICAL LOW (ref 135–145)
SODIUM SERPL-SCNC: 117 MMOL/L — CRITICAL LOW (ref 135–145)
SODIUM SERPL-SCNC: 119 MMOL/L — CRITICAL LOW (ref 135–145)
SODIUM UR-SCNC: <35 MMOL/L — SIGNIFICANT CHANGE UP
TRIGL SERPL-MCNC: 89 MG/DL — SIGNIFICANT CHANGE UP
TROPONIN T, HIGH SENSITIVITY RESULT: 9 NG/L — SIGNIFICANT CHANGE UP (ref 0–51)
TSH SERPL-MCNC: 1.57 UIU/ML — SIGNIFICANT CHANGE UP (ref 0.27–4.2)
URATE UR-MCNC: 16.5 MG/DL — SIGNIFICANT CHANGE UP
WBC # BLD: 10.57 K/UL — HIGH (ref 3.8–10.5)
WBC # BLD: 8.9 K/UL — SIGNIFICANT CHANGE UP (ref 3.8–10.5)
WBC # BLD: 9.75 K/UL — SIGNIFICANT CHANGE UP (ref 3.8–10.5)
WBC # FLD AUTO: 10.57 K/UL — HIGH (ref 3.8–10.5)
WBC # FLD AUTO: 8.9 K/UL — SIGNIFICANT CHANGE UP (ref 3.8–10.5)
WBC # FLD AUTO: 9.75 K/UL — SIGNIFICANT CHANGE UP (ref 3.8–10.5)

## 2021-01-08 PROCEDURE — 71045 X-RAY EXAM CHEST 1 VIEW: CPT | Mod: 26,77

## 2021-01-08 PROCEDURE — 71045 X-RAY EXAM CHEST 1 VIEW: CPT | Mod: 26

## 2021-01-08 PROCEDURE — 93010 ELECTROCARDIOGRAM REPORT: CPT

## 2021-01-08 PROCEDURE — 99291 CRITICAL CARE FIRST HOUR: CPT

## 2021-01-08 PROCEDURE — 70450 CT HEAD/BRAIN W/O DYE: CPT | Mod: 26

## 2021-01-08 PROCEDURE — 99285 EMERGENCY DEPT VISIT HI MDM: CPT

## 2021-01-08 PROCEDURE — 99223 1ST HOSP IP/OBS HIGH 75: CPT | Mod: GC

## 2021-01-08 PROCEDURE — 93306 TTE W/DOPPLER COMPLETE: CPT | Mod: 26

## 2021-01-08 RX ORDER — DEXTROSE 50 % IN WATER 50 %
12.5 SYRINGE (ML) INTRAVENOUS ONCE
Refills: 0 | Status: DISCONTINUED | OUTPATIENT
Start: 2021-01-08 | End: 2021-01-11

## 2021-01-08 RX ORDER — HEPARIN SODIUM 5000 [USP'U]/ML
5000 INJECTION INTRAVENOUS; SUBCUTANEOUS EVERY 8 HOURS
Refills: 0 | Status: DISCONTINUED | OUTPATIENT
Start: 2021-01-08 | End: 2021-01-11

## 2021-01-08 RX ORDER — INSULIN LISPRO 100/ML
VIAL (ML) SUBCUTANEOUS AT BEDTIME
Refills: 0 | Status: DISCONTINUED | OUTPATIENT
Start: 2021-01-08 | End: 2021-01-11

## 2021-01-08 RX ORDER — INSULIN LISPRO 100/ML
VIAL (ML) SUBCUTANEOUS
Refills: 0 | Status: DISCONTINUED | OUTPATIENT
Start: 2021-01-08 | End: 2021-01-11

## 2021-01-08 RX ORDER — DEXTROSE 50 % IN WATER 50 %
25 SYRINGE (ML) INTRAVENOUS ONCE
Refills: 0 | Status: DISCONTINUED | OUTPATIENT
Start: 2021-01-08 | End: 2021-01-11

## 2021-01-08 RX ORDER — SODIUM ZIRCONIUM CYCLOSILICATE 10 G/10G
10 POWDER, FOR SUSPENSION ORAL ONCE
Refills: 0 | Status: COMPLETED | OUTPATIENT
Start: 2021-01-08 | End: 2021-01-08

## 2021-01-08 RX ORDER — SODIUM ZIRCONIUM CYCLOSILICATE 10 G/10G
5 POWDER, FOR SUSPENSION ORAL ONCE
Refills: 0 | Status: COMPLETED | OUTPATIENT
Start: 2021-01-08 | End: 2021-01-08

## 2021-01-08 RX ORDER — DEXTROSE 50 % IN WATER 50 %
15 SYRINGE (ML) INTRAVENOUS ONCE
Refills: 0 | Status: DISCONTINUED | OUTPATIENT
Start: 2021-01-08 | End: 2021-01-11

## 2021-01-08 RX ORDER — SODIUM CHLORIDE 9 MG/ML
1000 INJECTION INTRAMUSCULAR; INTRAVENOUS; SUBCUTANEOUS ONCE
Refills: 0 | Status: COMPLETED | OUTPATIENT
Start: 2021-01-08 | End: 2021-01-08

## 2021-01-08 RX ORDER — ONDANSETRON 8 MG/1
4 TABLET, FILM COATED ORAL ONCE
Refills: 0 | Status: COMPLETED | OUTPATIENT
Start: 2021-01-08 | End: 2021-01-08

## 2021-01-08 RX ORDER — SODIUM CHLORIDE 5 G/100ML
100 INJECTION, SOLUTION INTRAVENOUS
Refills: 0 | Status: DISCONTINUED | OUTPATIENT
Start: 2021-01-08 | End: 2021-01-10

## 2021-01-08 RX ORDER — SODIUM CHLORIDE 9 MG/ML
250 INJECTION INTRAMUSCULAR; INTRAVENOUS; SUBCUTANEOUS ONCE
Refills: 0 | Status: COMPLETED | OUTPATIENT
Start: 2021-01-08 | End: 2021-01-08

## 2021-01-08 RX ORDER — CHLORHEXIDINE GLUCONATE 213 G/1000ML
1 SOLUTION TOPICAL
Refills: 0 | Status: DISCONTINUED | OUTPATIENT
Start: 2021-01-08 | End: 2021-01-11

## 2021-01-08 RX ORDER — FENTANYL CITRATE 50 UG/ML
25 INJECTION INTRAVENOUS ONCE
Refills: 0 | Status: DISCONTINUED | OUTPATIENT
Start: 2021-01-08 | End: 2021-01-08

## 2021-01-08 RX ORDER — GLUCAGON INJECTION, SOLUTION 0.5 MG/.1ML
1 INJECTION, SOLUTION SUBCUTANEOUS ONCE
Refills: 0 | Status: DISCONTINUED | OUTPATIENT
Start: 2021-01-08 | End: 2021-01-11

## 2021-01-08 RX ORDER — DOPAMINE HYDROCHLORIDE 40 MG/ML
3 INJECTION, SOLUTION, CONCENTRATE INTRAVENOUS
Qty: 400 | Refills: 0 | Status: DISCONTINUED | OUTPATIENT
Start: 2021-01-08 | End: 2021-01-08

## 2021-01-08 RX ADMIN — SODIUM ZIRCONIUM CYCLOSILICATE 5 GRAM(S): 10 POWDER, FOR SUSPENSION ORAL at 16:04

## 2021-01-08 RX ADMIN — FENTANYL CITRATE 25 MICROGRAM(S): 50 INJECTION INTRAVENOUS at 08:59

## 2021-01-08 RX ADMIN — SODIUM ZIRCONIUM CYCLOSILICATE 10 GRAM(S): 10 POWDER, FOR SUSPENSION ORAL at 20:36

## 2021-01-08 RX ADMIN — SODIUM CHLORIDE 250 MILLILITER(S): 9 INJECTION INTRAMUSCULAR; INTRAVENOUS; SUBCUTANEOUS at 14:26

## 2021-01-08 RX ADMIN — SODIUM CHLORIDE 1000 MILLILITER(S): 9 INJECTION INTRAMUSCULAR; INTRAVENOUS; SUBCUTANEOUS at 08:21

## 2021-01-08 RX ADMIN — Medication 2: at 17:57

## 2021-01-08 RX ADMIN — ONDANSETRON 4 MILLIGRAM(S): 8 TABLET, FILM COATED ORAL at 08:18

## 2021-01-08 RX ADMIN — SODIUM CHLORIDE 100 MILLILITER(S): 5 INJECTION, SOLUTION INTRAVENOUS at 16:12

## 2021-01-08 RX ADMIN — HEPARIN SODIUM 5000 UNIT(S): 5000 INJECTION INTRAVENOUS; SUBCUTANEOUS at 21:51

## 2021-01-08 NOTE — ED PROVIDER NOTE - PHYSICAL EXAMINATION
Attending note. She is alert and in no acute distress. Skin is warm and dry. There is no pallor. Lungs are clear and equal bilaterally. Heart is regular rate and rhythm with bradycardia. Abdomen is soft and nontender. Nonpitting edema of lower extremity. There is no calf tenderness. Neurologic examination is intact and nonfocal.

## 2021-01-08 NOTE — ED ADULT NURSE NOTE - NSIMPLEMENTINTERV_GEN_ALL_ED
Implemented All Fall with Harm Risk Interventions:  [place] to call system. Call bell, personal items and telephone within reach. Instruct patient to call for assistance. Room bathroom lighting operational. Non-slip footwear when patient is off stretcher. Physically safe environment: no spills, clutter or unnecessary equipment. Stretcher in lowest position, wheels locked, appropriate side rails in place. Provide visual cue, wrist band, yellow gown, etc. Monitor gait and stability. Monitor for mental status changes and reorient to person, place, and time. Review medications for side effects contributing to fall risk. Reinforce activity limits and safety measures with patient and family. Provide visual clues: red socks.

## 2021-01-08 NOTE — H&P ADULT - NSHPPHYSICALEXAM_GEN_ALL_CORE
She is alert and in no acute distress. Skin is warm and dry. There is no pallor. Lungs are clear and equal bilaterally. Heart is regular rate and rhythm with bradycardia. Abdomen is soft and nontender. Nonpitting edema of lower extremity. There is no calf tenderness. Neurologic examination is intact and nonfocal.    Physical Exam:  Gen: Appears restless, alert, responds appropriately to questions, non-toxic appearing  Neck: No JVD   HEENT: EOMI, PEERL, normal conjunctiva, tongue midline, oral mucosa moist  Lung: +crackles to B/L lung bases, no wheezing appreciated, non-tachypneic   CV: Bradycardic, no murmurs, rubs or gallops, 1+ pitting edema in B/L LE   Abd: soft, NT, ND, no guarding, no rigidity, no rebound tenderness  MSK: no visible deformities, ROM normal in UE/LE, no back pain  Neuro: No focal sensory or motor deficits  Skin: Warm, well perfused, no rash  Psych: normal affect, calm

## 2021-01-08 NOTE — H&P ADULT - HISTORY OF PRESENT ILLNESS
65y F w/ PMHx  The patient was transferred from [place] emergency department. Patient was feeling weak and tired this morning and vomited and was taken to the emergency department there was found to have complete heart block. Patient denies any chest pains, shortness of breath, fever or previous episodes. Patient was well until 3 days ago when she developed generalized weakness and malaise. She also developed swelling of the hands and face as well as lower extremities. She has no history of hypothyroidism. She has a history of diabetes, asthma, hypertension. She was taking metoprolol until December it was stopped. No labs were available from [place] ER. 65y F w/ PMHx HTN, HLD, GERD, Asthma presenting as a transfer from [facility]. Patient was feeling weak and tired this morning and vomited and was taken to the emergency department there was found to have complete heart block. Patient denies any chest pains, shortness of breath, fever or previous episodes. Patient was well until 3 days ago when she developed generalized weakness and malaise. She also developed swelling of the hands and face as well as lower extremities. She has no history of hypothyroidism. She was taking metoprolol until December, when it was stopped.  65y F w/ PMHx HTN, HLD, GERD, Asthma presenting as a transfer from [place] ED in setting of complete heart block. Patient was feeling weak and tired this morning and vomited x2 and was taken to the emergency department there was found to have complete heart block. Patient denies any chest pain, shortness of breath, fever or previous episodes. Patient was well until 3 days ago when she developed generalized weakness, malaise, and swelling of her hands, face and B/L LE. She has no known history of hypothyroidism. She was taking metoprolol until November for her hx of HTN, but daughter states her prescription ran out and was unable to refill. Daughter denies known sick contacts, recent travel. Ambulates without assistive device at baseline.    ED Course: Pt administered atropine 0.5mg  x 6 by EMS prior to OSH arrival. Pt arrived to [facility] w/ HR in 30s, Hypertensive and pacer pads placed on chest.

## 2021-01-08 NOTE — CONSULT NOTE ADULT - ASSESSMENT
65F PMHx HTN, HLD, GERD, Asthma who was transferred from [place] [facility] to [facility] for complete heart block.  History obtain from patient's daughter Ney Robles who report over past week patient developed shortness of breath which progressively worsening over last 3 days.  Yesterday (1/7/21) she noticed her mother's face was swollen and today (1/8/21) became swollen and increase work of breathing therefore used multiple albuterol treatment.  Her mother also endorse malaise, fatigue, nausea, vomiting, decrease PO intake and increase urine output few days as well.  Pt had diarrhea 3 days ago which resolved.  Her mother is on low/no salt diet as recommended by her doctor to help with her hypertension.  In November 2020, patient was switch from losartan to amlodipine, ran out metoprolol and currently taking oxybutynin.  Daughter denies her mother had history of hyponatremia, no increase fluid intake, no new medications/herbal supp, no decrease urine output.        # Hyponatremia  Pt with hyponatremia hypervolemia likely multifactorial including heart failure from complete heart block and/or SIADH in setting nasuea/vomiting/oxybutynin.  On admission serum Na 117 on 1/8/21 (prior sNa 144 on 12/22/20), Uosm 395, sOsm 268, Atul<35, pro BNP 6076      Nephrology consulted for hyponatremia.  Upon review of [facility], patient's prior serum sodium was 144 on 12/22/2020.  On admission serum Na was 117 on 1/8/21 with serum osmolality 268, urine osmolality 395, urine sodium <35, K 5.6, chloride 86, glucose 230, pro BNP 6076, CXR showed mild pulmonary edema, CT head showed no acute process and bedside Echo unremarkable.  Pt was given lokelma for hyperkalemia and normal saline after which her serum Na decreased to 115.  Pt seen and examined at bedside who endorsing feeling thirsty, mild headache but oriented x 2 (name, year) however unsure why she in the hospital.  Pt also received atropine 0.5mg x 6 by EMS and upon arrival on [facility], her HR 30s.   65F PMHx HTN, HLD, GERD, Asthma who was transferred from [place] [facility] to [facility] for complete heart block. Nephrology consulted for hyponatremia (117).      # Hyponatremia  Pt with hyponatremia hypervolemia likely 2/2 heart failure from complete heart block causing high ADH and/or low solute intake (tea and toast diet) or  possible SIADH in setting nausea/vomiting, oxybutynin.  On admission serum Na 117 on 1/8/21 (prior sNa 144 on 12/22/20) dropped to 115 after NS bolus consistent w/ high ADH state.  Labs noted for Uosm 395, sOsm 268, Atul<35, pro BNP 6076.    Recommendations:   - recommend STAT hypertonic saline 3% 100 cc bolus for symptomatic hyponatremia then repeat BMP 1-2 hour after completion, the goal will be get serum Na >120 (improve symptoms).  Monitor respiratory status  -  FLUID RESTRICTION 1 liter per day, avoid hypotonic solution (1/2 NS, D5W) including additive in IV medications if possible  - closely monitor UOP, if UOP increase >3L/hour will need DDAVP IV 1-2 mcg to decrease UOP and prevent rapid correction hyponatremia.  If UOP is still high, please call nephrology fellow on-call  - GOAL serum Na level tomorrow 1/9/21 10AM is 123-125 mEq  - monitor BMP every 2-4 hours for now, if by tomorrow (1/9/21) serum Na doesn't improve, would consider lasix IV (tomorrow) give hypervolemia  - Glycemic control, HOLD oxybutynin, avoid any thiazide  - please call nephrology fellow on-call for any questions/updates      # Hyperkalemia   Pt with hyperkalemia likely 2/2 acidosis (primary respiratory acidosis), lactic acidosis.  On admission serum K 5.6, pt given lokelma 5g x 1 in ED, repeat vbg K 4.7  - f/u repeat BMP in 1-2hr, if serum K>5.3 would give lokelma 10g x 1 dose then repeat BMP 1-2 hours after  - monitor BMP, low K diet    # Acidosis  Pt with acidosis, primary respiratory acidosis in the setting pulmonary edema.  On admission vbg pH 7.25, pCO2 53, HCO3 23, lactate 2.2>3.8  - treat underlying respiratory process, would hold off on bicarbonate for now given pulmonary edema/fluid overload.  If no improve by tomorrow would consider lasix IV  - monitor blood gas   65F PMHx HTN, HLD, GERD, Asthma who was transferred from [place] ED to [facility] for complete heart block. Nephrology consulted for hyponatremia (117).      # Hyponatremia  Pt with hyponatremia hypervolemia likely 2/2 heart failure from complete heart block causing high ADH and/or low solute intake (tea and toast diet) or  possible SIADH in setting nausea/vomiting, oxybutynin.  On admission serum Na 117 on 1/8/21 (prior sNa 144 on 12/22/20) dropped to 115 after NS bolus consistent w/ high ADH state.  Labs noted for Uosm 395, sOsm 268, Atul<35, pro BNP 6076.    Recommendations:   - recommend STAT hypertonic saline 3% 100 cc bolus for symptomatic hyponatremia then repeat BMP 1-2 hour after completion, the goal will be get serum Na >120 (improve symptoms).  Monitor respiratory status  -  FLUID RESTRICTION 1 liter per day, avoid hypotonic solution (1/2 NS, D5W) including additive in IV medications if possible  - closely monitor UOP, if UOP increase >3L/hour will need DDAVP IV 1-2 mcg to decrease UOP and prevent rapid correction hyponatremia.  If UOP is still high, please call nephrology fellow on-call  - GOAL serum Na level tomorrow 1/9/21 10AM is 121-123 mEq  - monitor BMP every 2-4 hours for now, if by tomorrow (1/9/21) serum Na doesn't improve, would consider lasix IV (tomorrow) give hypervolemia  - Glycemic control, HOLD oxybutynin, avoid any thiazide or SSRI  - please call nephrology fellow on-call for any questions/updates      # Hyperkalemia   Pt with hyperkalemia likely 2/2 acidosis (primary respiratory acidosis), lactic acidosis.  On admission serum K 5.6, pt given lokelma 5g x 1 in ED, repeat vbg K 4.7  - f/u repeat BMP in 1-2hr, if serum K>5.3 would give lokelma 10g x 1 dose then repeat BMP 1-2 hours after  - monitor BMP, low K diet    # Acidosis  Pt with acidosis, primary respiratory acidosis in the setting pulmonary edema.  On admission vbg pH 7.25, pCO2 53, HCO3 23, lactate 2.2>3.8  - treat underlying respiratory process, would hold off on bicarbonate for now given pulmonary edema/fluid overload.  If no improve by tomorrow would consider lasix IV  - monitor blood gas   65F PMHx HTN, HLD, GERD, Asthma who was transferred from [place] [facility] [place] [facility] to [facility] for complete heart block. Nephrology consulted for hyponatremia (117).      # Hyponatremia  Pt with hyponatremia hypervolemia likely 2/2 heart failure from complete heart block causing high ADH and/or low solute intake (tea and toast diet) or  possible SIADH in setting nausea/vomiting, oxybutynin.  On admission serum Na 117 on 1/8/21 (prior sNa 144 on 12/22/20) dropped to 115 after NS bolus consistent w/ high ADH state.  Labs noted for Uosm 395, sOsm 268, Atul<35, pro BNP 6076.    Recommendations:   - recommend STAT hypertonic saline 3% 100 cc bolus for symptomatic hyponatremia then repeat BMP 1-2 hour after completion, the goal will be get serum Na >120 (improve symptoms).  Monitor respiratory status  -  FLUID RESTRICTION 1 liter per day, avoid hypotonic solution (1/2 NS, D5W) including additive in IV medications if possible  - closely monitor UOP, if UOP increase >3L/hour will need DDAVP IV 2 mcg to decrease UOP and prevent rapid correction hyponatremia (can give every 6 hours if needed).  If UOP is still high, please call nephrology fellow on-call  - GOAL serum Na level tomorrow 1/9/21 10AM is 121-123 mEq  - monitor BMP every 2-4 hours for now, if by tomorrow (1/9/21) serum Na doesn't improve, would consider lasix IV (tomorrow) give hypervolemia  - Glycemic control, HOLD oxybutynin, avoid any thiazide or SSRI  - please call nephrology fellow on-call for any questions/updates      # Hyperkalemia   Pt with hyperkalemia likely 2/2 acidosis (primary respiratory acidosis), lactic acidosis.  On admission serum K 5.6, pt given lokelma 5g x 1 in ED, repeat vbg K 4.7  - f/u repeat BMP in 1-2hr, if serum K>5.3 would give lokelma 10g x 1 dose then repeat BMP 1-2 hours after  - monitor BMP, low K diet    # Acidosis  Pt with acidosis, primary respiratory acidosis in the setting pulmonary edema.  On admission vbg pH 7.25, pCO2 53, HCO3 23, lactate 2.2>3.8  - treat underlying respiratory process, would hold off on bicarbonate for now given pulmonary edema/fluid overload.  If no improve by tomorrow would consider lasix IV  - monitor blood gas   65F PMHx HTN, HLD, GERD, Asthma who was transferred from [place] ED to [facility] for complete heart block. Nephrology consulted for hyponatremia (117).      # Hyponatremia  Pt with hyponatremia hypervolemia likely 2/2 heart failure from complete heart block causing high ADH and/or low solute intake (tea and toast diet) or  possible SIADH in setting nausea/vomiting, oxybutynin.  On admission serum Na 117 on 1/8/21 (prior sNa 144 on 12/22/20) dropped to 115 after NS bolus consistent w/ high ADH state.  Labs noted for Uosm 395, sOsm 268, Atul<35, pro BNP 6076.    Recommendations:   - recommend STAT hypertonic saline 3% 100 cc bolus for symptomatic hyponatremia then repeat BMP 1-2 hour after completion, the goal will be get serum Na >120 (improve symptoms).  Monitor respiratory status  -  FLUID RESTRICTION 1 liter per day, avoid hypotonic solution (1/2 NS, D5W) including additive in IV medications if possible  - closely monitor UOP, if UOP increase >3L/hour will need DDAVP IV 2 mcg to decrease UOP and prevent rapid correction hyponatremia (can give every 6 hours if needed).  If UOP is still high, please call nephrology fellow on-call  - GOAL serum Na level tomorrow 1/9/21 10AM is 121-123 mEq  - monitor BMP every 2-4 hours for now, if by tomorrow (1/9/21) serum Na doesn't improve, would consider lasix IV (tomorrow) give hypervolemia  - Glycemic control, HOLD oxybutynin, avoid any thiazide or SSRI  - check TSH/FT4, morning cortisol level  - please call nephrology fellow on-call for any questions/updates      # Hyperkalemia   Pt with hyperkalemia likely 2/2 acidosis (primary respiratory acidosis), lactic acidosis.  On admission serum K 5.6, pt given lokelma 5g x 1 in ED, repeat vbg K 4.7  - f/u repeat BMP in 1-2hr, if serum K>5.3 would give lokelma 10g x 1 dose then repeat BMP 1-2 hours after  - monitor BMP, low K diet    # Acidosis  Pt with acidosis, primary respiratory acidosis in the setting pulmonary edema.  On admission vbg pH 7.25, pCO2 53, HCO3 23, lactate 2.2>3.8  - treat underlying respiratory process, would hold off on bicarbonate for now given pulmonary edema/fluid overload.  If no improve by tomorrow would consider lasix IV  - monitor blood gas   65F PMHx HTN, HLD, GERD, Asthma who was transferred from [place] ED to [facility] for complete heart block. Nephrology consulted for hyponatremia (117).      # Hyponatremia  Pt with hyponatremia hypervolemia likely 2/2 heart failure from complete heart block causing high ADH and/or low solute intake (tea and toast diet) or  possible SIADH in setting nausea/vomiting, oxybutynin.  On admission serum Na 117 on 1/8/21 (prior sNa 144 on 12/22/20) dropped to 115 after NS bolus consistent w/ high ADH state.  Labs noted for Uosm 395, sOsm 268, Atul<35, pro BNP 6076.    Recommendations:   - recommend STAT hypertonic saline 3% 100 cc bolus for symptomatic hyponatremia then repeat BMP 1-2 hour after completion, the goal will be get serum Na >120 (improve symptoms).  Monitor respiratory status  -  FLUID RESTRICTION 1 liter per day, avoid hypotonic solution (1/2 NS, D5W) including additive in IV medications if possible  - closely monitor UOP, if UOP increase >3L/hour will need DDAVP IV 2 mcg to decrease UOP and prevent rapid correction hyponatremia (can give every 6 hours if needed).  If UOP is still high, please call nephrology fellow on-call  - GOAL serum Na level tomorrow 1/9/21 10AM is 121-123 mEq  - monitor BMP every 2-4 hours for now, if by tomorrow (1/9/21) serum Na doesn't improve, would consider lasix IV (tomorrow) give hypervolemia  - Glycemic control, HOLD oxybutynin, avoid any thiazide or SSRI  - check TSH/FT4, morning cortisol level  - treat nausea/vomiting/pain symptoms to decrease possibly ADH triggers  - please call nephrology fellow on-call for any questions/updates      # Hyperkalemia   Pt with hyperkalemia likely 2/2 acidosis (primary respiratory acidosis), lactic acidosis.  On admission serum K 5.6, pt given lokelma 5g x 1 in ED, repeat vbg K 4.7  - f/u repeat BMP in 1-2hr, if serum K>5.3 would give lokelma 10g x 1 dose then repeat BMP 1-2 hours after  - monitor BMP, low K diet    # Acidosis  Pt with acidosis, primary respiratory acidosis in the setting pulmonary edema.  On admission vbg pH 7.25, pCO2 53, HCO3 23, lactate 2.2>3.8  - treat underlying respiratory process, would hold off on bicarbonate for now given pulmonary edema/fluid overload.  If no improve by tomorrow would consider lasix IV  - monitor blood gas

## 2021-01-08 NOTE — ED PROVIDER NOTE - CLINICAL SUMMARY MEDICAL DECISION MAKING FREE TEXT BOX
patient with complete heart block, HR 30. BP elevated. mentating normally in ED. pads applied to patient. evaluation begun by evaluating for ischemia or eectolyr abnormality. transfer initiated to obtain definitive care with NICOLE elliott at [facility]. will transfer

## 2021-01-08 NOTE — CONSULT NOTE ADULT - ASSESSMENT
66 yo F with pmhx of HTN and DM presents with 2 days of malaise/N/V and found to be in CHB.    Maintain NPO for PPM this afternoon  Stat TTE  Keep atropine at bedside and pacer pads on patient  Hold all BB and CCB agents  EKG: RBBB, LPFB, HR 35s, complete heart block 66 yo F with pmhx of HTN and DM presents with 2 days of malaise/N/V and found to be in CHB.    Maintain NPO for PPM this afternoon  Stat TTE  Keep atropine at bedside and pacer pads on patient  Hold all BB and CCB agents  EKG: RBBB, LPFB, HR 35s, complete heart block    Daughter- Ney Robles [phone] 66 yo F with pmhx of HTN and DM presents with 2 days of malaise/N/V and found to be in CHB.    Given electrolyte derangements and AMS, will defer PPM at this time. No need for TVP at this time but if she develops alternating BBB or other conduction abnormalities outside of her current RBBB/LPFB, she would require a TVP.  Keep atropine at bedside and pacer pads on patient  Hold all BB and CCB agents  EKG: RBBB, LPFB, HR 35s, complete heart block    Daughter- [facility]enoTravis [phone]

## 2021-01-08 NOTE — CONSULT NOTE ADULT - SUBJECTIVE AND OBJECTIVE BOX
[facility] DIVISION OF KIDNEY DISEASES AND HYPERTENSION   -- INITIAL CONSULT NOTE --  Aleida Sebastian, Nephrology Fellow     NS Pager: [phone] / JOSSELIN Pager: [phone]  After 5pm or on weekend, please page the on-call fellow)  --------------------------------------------------------------------------------    HPI: 65F PMHx HTN, HLD, GERD, Asthma who was transferred from [place] ED for complete heart block.  History obtain from patient's daughter Ney Robles who report over past week patient developed shortness of breath which progressively worsening over last 3 days.  Yesterday (1/7/21) she noticed her mother's face was swollen and today (1/8/21) became swollen and increase work of breathing therefore used multiple albuterol treatment.  Her mother also endorse malaise, fatigue, nausea, vomiting, decrease PO intake and increase urine output few days as well.  Pt had diarrhea 3 days ago which resolved.  Her mother is on low/no salt diet as recommended by her doctor to help with her hypertension.  In November 2020, patient was switch from losartan to amlodipine, ran out metoprolol and currently taking oxybutynin.  Daughter denies her mother had history of hyponatremia, no increase fluid intake, no new medications/herbal supp, no decrease urine output.      Nephrology consulted for hyponatremia.  Upon review of [facility], patient's prior serum sodium was 144 on 12/22/2020.  On admission serum Na was 117 on 1/8/21 with serum osmolality 268, urine osmolality 395, urine sodium <35, K 5.6, chloride 86, glucose 230, pro BNP 6076, CXR showed mild pulmonary edema, CT head showed no acute process and bedside Echo unremarkable.  Pt was given lokelma for hyperkalemia and normal saline after which her serum Na decreased to 115.  Pt seen and examined at bedside who endorsing feeling thirsty, mild headache but oriented x 2 (name, year) however unsure why she in the hospital.  Pt also received atropine 0.5mg x 6 by EMS and upon arrival on [facility], her HR 30s.      PAST HISTORY  --------------------------------------------------------------------------------  PAST MEDICAL & SURGICAL HISTORY:  Calculus of gallbladder without cholecystitis without obstruction  GERD (gastroesophageal reflux disease)  Asthma  Exacerbates in the winter  DM2 (diabetes mellitus, type 2)  HLD (hyperlipidemia)  HTN (hypertension)  No significant past surgical history    FAMILY HISTORY:    PAST SOCIAL HISTORY:  ALLERGIES & MEDICATIONS  --------------------------------------------------------------------------------  Allergies  No Known Allergies    Intolerances    Standing Inpatient Medications  chlorhexidine 4% Liquid 1 Application(s) Topical <User Schedule>  sodium chloride 0.9% Bolus 250 milliLiter(s) IV Bolus once  sodium zirconium cyclosilicate 5 Gram(s) Oral once    PRN Inpatient Medications    REVIEW OF SYSTEMS  Gen: no fever, chills, weakness. +thirsty, malaise  Respiratory: No dyspnea, cough  CV: No chest pain  GI: No abdominal pain, + nausea, vomiting, diarrhea  MSK: + edema  Neuro: + headache, No dizziness, lightheadedness  All other systems were reviewed and are negative, except as noted.    VITALS/PHYSICAL EXAM  --------------------------------------------------------------------------------  T(C): 36.4 (01-08-21 @ 10:30), Max: 36.5 (01-08-21 @ 09:48)  HR: 40 (01-08-21 @ 14:00) (32 - 40)  BP: 161/61 (01-08-21 @ 14:00) (126/93 - 163/94)  RR: 26 (01-08-21 @ 14:00) (18 - 28)  SpO2: 95% (01-08-21 @ 14:00) (95% - 97%)  Wt(kg): --  Height (cm): 152.4 (01-08-21 @ 10:30)  Weight (kg): 65.7 (01-08-21 @ 10:30)  BMI (kg/m2): 28.3 (01-08-21 @ 10:30)  BSA (m2): 1.63 (01-08-21 @ 10:30)    01-08-21 @ 07:01  -  01-08-21 @ 14:21  --------------------------------------------------------  IN: 0 mL / OUT: 75 mL / NET: -75 mL    Physical Exam:              Gen: NAD, well-appearing on room air  	Pulm: CTA B/L, no crackles  	CV: RRR, S1S2; no rub/murmur  	GI: +BS, soft, nontender/nondistended  	: dunn catheter in place  	MSK: Warm, bilateral lower ext edema +1               Neuro: AAOx3  	Psych: Normal affect and mood  	Skin: Warm    LABS/STUDIES  --------------------------------------------------------------------------------                11.4   10.57 >-----------<  173      [01-08-21 @ 11:44]              34.6     115  |  86  |  26  ----------------------------<  230      [01-08-21 @ 11:44]  5.6   |  19  |  0.94        Ca     7.7     [01-08-21 @ 11:44]      Mg     2.0     [01-08-21 @ 11:44]      Phos  3.5     [01-08-21 @ 11:44]    TPro  6.7  /  Alb  3.4  /  TBili  0.8  /  DBili  x   /  AST  37  /  ALT  67  /  AlkPhos  64  [01-08-21 @ 11:44]    PT/INR: PT 12.8 , INR 1.07       [01-08-21 @ 10:02]  PTT: 28.6       [01-08-21 @ 10:02]    Serum Osmolality 262      [01-08-21 @ 12:21]    Creatinine Trend:  SCr 0.94 [01-08 @ 11:44]  SCr 0.88 [01-08 @ 10:02]    Urine Creatinine 119      [01-08-21 @ 12:48]  Urine Sodium <35      [01-08-21 @ 12:48]  Urine Osmolality 395      [01-08-21 @ 12:48]    HbA1c 6.5      [12-03-18 @ 22:38]  
Patient seen and evaluated at bedside    Chief Complaint:    HPI:  64 yo F with hx of HTN and DM presents with 2 days of N/V and malaise. Presented to OSH in [facility] and transferred here for further management. Upon arrival here denied any chest pain, SOB, light headedness/dizziness. Denies any prior cardiac history. Endorsed weakness that she had for the past two days.   BPs on my arrival 160/90.  EKG: RBBB, LPFB, HR 35s, complete heart block    PMHx:   Calculus of gallbladder without cholecystitis without obstruction    GERD (gastroesophageal reflux disease)    Asthma    DM2 (diabetes mellitus, type 2)    HLD (hyperlipidemia)    HTN (hypertension)        PSHx:   No significant past surgical history        Allergies:  No Known Allergies      Home Meds:    Current Medications:       FAMILY HISTORY:        Physical Exam:  T(F): 97.7 (01-08), Max: 97.7 (01-08)  HR: 39 (01-08) (37 - 39)  BP: 136/68 (01-08) (136/68 - 153/60)  RR: 18 (01-08)  SpO2: 97% (01-08)  GENERAL: No acute distress, well-developed  HEART:bradycardic, no m/r/g    Cardiovascular Diagnostic Testing:    ECG: Personally reviewed:    Echo: Personally reviewed:    Stress Testing:    Cath:    Imaging:    CXR: Personally reviewed    Labs: Personally reviewed                        11.5   9.75  )-----------( 181      ( 08 Jan 2021 10:02 )             33.6           PT/INR - ( 08 Jan 2021 10:02 )   PT: 12.8 sec;   INR: 1.07 ratio         PTT - ( 08 Jan 2021 10:02 )  PTT:28.6 sec

## 2021-01-08 NOTE — H&P ADULT - NSHPLABSRESULTS_GEN_ALL_CORE
Patient name: MATA CARRILLO  YOB: 1955   Age: 65 (F)   MR#: [id number]  Study Date: 1/8/2021  [facility] RDCS  Study quality: Technically difficult  Referring Physician: Gretta Hughes MD  Blood Pressure: 126/93 mmHg  Height: 152 cm  Weight: 65 kg  BSA: 1.6 m2  ------------------------------------------------------------------------  PROCEDURE: Transthoracic echocardiogram with 2-D, M-Mode  and complete spectral and color flow Doppler. Verbal  consent was obtained for injection of  Ultrasonic Enhancing  Agent following a discussion of risks and benefits.  Following intravenous injection of Ultrasonic Enhancing  Agent , harmonic imaging was performed.  INDICATION: Abnormal electrocardiogram (ECG) (EKG) (R94.31)  ------------------------------------------------------------------------  Dimensions:    Normal Values:  LA:            2.0 - 4.0 cm  Ao:            2.0 - 3.8 cm  SEPTUM:        0.6 - 1.2 cm  PWT:           0.6 - 1.1 cm  LVIDd:         3.0 - 5.6 cm  LVIDs:         1.8 - 4.0 cm  EF (Visual Estimate): 65-70 %  ------------------------------------------------------------------------  Observations:  Mitral Valve: Mitral annular calcification, otherwise  normal mitral valve. Mild-moderate mitral regurgitation.  Aortic Valve/Aorta: Normal trileaflet aortic valve. No  aortic valve regurgitation seen.  Normal aortic root.  Left Atrium: Normal left atrium.  LA volume index = 18  cc/m2.  Left Ventricle: Endocardial visualization enhanced with  intravenous injection of Ultrasonic Enhancing Agent  (Definity). Normal left ventricular systolic function. No  segmental wall motion abnormalities. No left ventricular  thrombus. Normal left ventricular internal dimensions and  wall thicknesses.  Right Heart: Normal right atrium. The right ventricle is  not well visualized; grossly right ventricular enlargement  with normal right ventricular systolic function. Normal  tricuspid valve. Mild-moderate tricuspid regurgitation.  Normal pulmonic valve. Minimal pulmonic regurgitation.  Pericardium/Pleura: Normal pericardium with trace  pericardial effusion.  Hemodynamic: Estimated right ventricular systolic pressure  equals 29 mm Hg, assuming right atrial pressure equals 8 mm  Hg, consistent with normal pulmonary pressures.  ------------------------------------------------------------------------  Conclusions:  1. Mitral annular calcification, otherwise normal mitral  valve. Mild-moderate mitral regurgitation.  2. Normal trileaflet aortic valve. No aortic valve  regurgitation seen.  3. Endocardial visualization enhanced with intravenous  injection of Ultrasonic Enhancing Agent (Definity). Normal  left ventricular systolic function. No segmental wall  motion abnormalities. No left ventricular thrombus.  4. The right ventricle is not well visualized; grossly  right ventricular enlargement with normal right ventricular  systolic function.  *** No previous Echo exam.  ------------------------------------------------------------------------  Confirmed on  1/8/2021 - 12:00:47 by Kasi Porter M.D.

## 2021-01-08 NOTE — H&P ADULT - ASSESSMENT
=============ASSESSMENT & PLAN================   65y F w/ PMHx HTN, HLD, GERD, Asthma presenting as a transfer from [place] [facility] in setting of complete heart block and bradycardia.    =============NEURO====================  -Restless, c/f AMS on arrival    -CT head non-con  -confusion possibly in setting of hyponatremia, 250cc NS bolus     =============RESPIRATORY====================  B/L Pleural Effusions w/ mild pulmonary edema on CXR   -Sp02 95% on RA          =============ASSESSMENT & PLAN================   65y F w/ PMHx HTN, HLD, GERD, Asthma presenting as a transfer from [place] [facility] in setting of complete heart block and bradycardia.    =============NEURO====================  -Restless, c/f AMS on arrival    -CT head non-con  -confusion possibly in setting of hyponatremia, 250cc NS bolus     =============RESPIRATORY====================  B/L Pleural Effusions w/ mild pulmonary edema on CXR   -Sp02 95% on RA    =============CARDIOVASCULAR=================  3rd Degree Heart Block   -Transcutaneous pacer pads on chest  -Plan for PPM by EP in afternoon  -Hypertensive w/ HR 30-50s   -F/u TSH   -F/u cardiac enzymes     =============GASTROINTESTINAL================  -NPO for PPM     ===============RENAL===================  -BUN/Cr WNL  -Continue to monitor electrolytes, keep K>4, Mg >2    Hyponatremia   -F/u SOsm, UOsm, Urine lytes  -250cc NS bolus  -Nephrology consulted- f/u recs     Hyperkalemia  -Lokelma 5g x1   -Trend CMP    =============ENDOCRINE==================  T2DM  -F/u HgbA1C  -BG elevated 230  -Continue to trend BG           =============ASSESSMENT & PLAN================   65y F w/ PMHx HTN, HLD, GERD, Asthma presenting as a transfer from [place] [facility] in setting of complete heart block and bradycardia.    =============NEURO====================  -Restless, c/f AMS on arrival    -CT head non-con  -confusion possibly in setting of hyponatremia, 250cc NS bolus   -continue to monitor neuro status as per protocol     =============RESPIRATORY====================  B/L Pleural Effusions w/ mild pulmonary edema on CXR   -Sp02 95% on RA    =============CARDIOVASCULAR=================  3rd Degree Heart Block   -Transcutaneous pacer pads on chest  -Plan for PPM by EP   -Hypertensive w/ HR 30-50s   -F/u TSH   -F/u cardiac enzymes     =============GASTROINTESTINAL================  -NPO for PPM     ===============RENAL===================  -BUN/Cr WNL  -Continue to monitor electrolytes, keep K>4, Mg >2    Hyponatremia   -F/u SOsm, UOsm, Urine lytes  -250cc NS bolus  -Nephrology consulted- f/u recs     Hyperkalemia  -Lokelma 5g x1   -Trend CMP    =============ENDOCRINE==================  T2DM  -F/u HgbA1C  -BG elevated 230  -Continue to trend BG  -ISS    ===============HEME/ONC================  -H/H 11.4/34.6, stable  -continue to trend CBC    =============INFECTIOUS DISEASE=============  -Afebrile, mild leukocytosis, likely reactive   -continue to monitor fever curve/WBC     =============INFECTIOUS DISEASE=============           =============ASSESSMENT & PLAN================   65y F w/ PMHx HTN, HLD, GERD, Asthma presenting as a transfer from [place] ED in setting of complete heart block and bradycardia.    =============NEURO====================  -Restless, c/f AMS on arrival    -CT head non-con  -confusion possibly in setting of hyponatremia, 250cc NS bolus   -continue to monitor neuro status as per protocol     =============RESPIRATORY====================  B/L Pleural Effusions w/ mild pulmonary edema on CXR   -Sp02 95% on RA    =============CARDIOVASCULAR=================  3rd Degree Heart Block   -Transcutaneous pacer pads on chest  -Plan for PPM by EP   -Hypertensive w/ HR 30-50s   -F/u TSH   -F/u cardiac enzymes     =============GASTROINTESTINAL================  -NPO for PPM     ===============RENAL===================  -BUN/Cr WNL  -Continue to monitor electrolytes, keep K>4, Mg >2    Hyponatremia   -F/u SOsm, UOsm, Urine lytes  -250cc NS bolus  -Nephrology consulted- f/u recs     Hyperkalemia  -Lokelma 5g x1   -Trend CMP    =============ENDOCRINE==================  T2DM  -F/u HgbA1C  -BG elevated 230  -Continue to trend BG  -ISS    ===============HEME/ONC================  -H/H 11.4/34.6, stable  -continue to trend CBC    =============INFECTIOUS DISEASE=============  -Afebrile, mild leukocytosis, likely reactive   -continue to monitor fever curve/WBC              =============ASSESSMENT & PLAN================   65y F w/ PMHx HTN, HLD, GERD, Asthma presenting as a transfer from [place] ED in setting of complete heart block and bradycardia.    =============NEURO====================  -Restless, c/f AMS on arrival    -CT head non-con  -confusion possibly in setting of hyponatremia, 250cc NS bolus   -continue to monitor neuro status as per protocol     =============RESPIRATORY====================  B/L Pleural Effusions w/ mild pulmonary edema on CXR   -Sp02 95% on RA    =============CARDIOVASCULAR=================  3rd Degree Heart Block   -Transcutaneous pacer pads on chest  -Plan for PPM by EP   -Hypertensive w/ HR 30-50s   -F/u TSH   -F/u cardiac enzymes     =============GASTROINTESTINAL================  -NPO for PPM     ===============RENAL===================  -BUN/Cr WNL  -Continue to monitor electrolytes, keep K>4, Mg >2    Hyponatremia   -F/u SOsm, UOsm, Urine lytes  -250cc NS bolus  -Nephrology consulted- f/u recs     Hyperkalemia  -Lokelma 5g x1   -Trend CMP    =============ENDOCRINE==================  T2DM  -F/u HgbA1C  -BG elevated 230  -Continue to trend BG  -ISS    ===============HEME/ONC================  -H/H 11.4/34.6, stable  -continue to trend CBC    =============INFECTIOUS DISEASE=============  -Afebrile, mild leukocytosis, likely reactive   -continue to monitor fever curve/WBC                     ****Fellow Note****    Plan  -w/ CHB; BP remains elevated; will not place PPM in the setting of hypoNa  -AMS and agitation likely 2/2 electrolyte derangements; renal following; will receive hypertonic saline x 1hr and recheck BMP 1 hr post infusion  -EP recs appreciated; no indication for urgent TVP unless patient w/ new block, alternating BBB, or HD instability  -significant hyponatremia, possibly 2/2 SIADH    Basilio Powell MD  Cardiology Fellow - F1  Text or Call: [phone]  For all New Consults and Questions:  [email or web address]   Login: [email or web address]

## 2021-01-08 NOTE — PROGRESS NOTE ADULT - SUBJECTIVE AND OBJECTIVE BOX
MATA CARRILLO  MRN-[id number]  Patient is a 65y old  Female who presents with a chief complaint of complete heart block (08 Jan 2021 14:20)    HPI:  65y F w/ PMHx HTN, HLD, GERD, Asthma presenting as a transfer from [place] ED in setting of complete heart block. Patient was feeling weak and tired this morning and vomited x2 and was taken to the emergency department there was found to have complete heart block. Patient denies any chest pain, shortness of breath, fever or previous episodes. Patient was well until 3 days ago when she developed generalized weakness, malaise, and swelling of her hands, face and B/L LE. She has no known history of hypothyroidism. She was taking metoprolol until November for her hx of HTN, but daughter states her prescription ran out and was unable to refill. Daughter denies known sick contacts, recent travel. Ambulates without assistive device at baseline.    ED Course: Pt administered atropine 0.5mg  x 6 by EMS prior to OSH arrival. Pt arrived to [facility] w/ HR in 30s, Hypertensive and pacer pads placed on chest.  (08 Jan 2021 11:25)      Hospital Course:  1/8 Admitted to [facility] CICU for complete heart block.     24 HOUR EVENTS:        REVIEW OF SYSTEMS:   Constitutional: No weakness, fevers, or chills  Eyes/ENT: No visual changes  Respiratory: + SOB. No cough, wheezing, hemoptysis  Cardiovascular: No chest pain, no palpitations  Gastrointestinal: + N/V. No abdominal pain. No hematemesis.   Genitourinary: No dysuria  Neurological: No numbness, no weakness  Skin: No itching, rashes    ICU Vital Signs Last 24 Hrs  T(C): 36.4 (08 Jan 2021 19:23), Max: 36.7 (08 Jan 2021 16:00)  T(F): 97.5 (08 Jan 2021 19:23), Max: 98 (08 Jan 2021 16:00)  HR: 117 (08 Jan 2021 19:23) (32 - 117)  BP: 161/66 (08 Jan 2021 19:23) (126/93 - 163/94)  BP(mean): 91 (08 Jan 2021 19:23) (88 - 115)  ABP: --  ABP(mean): --  RR: 19 (08 Jan 2021 19:23) (18 - 28)  SpO2: 94% (08 Jan 2021 19:23) (94% - 97%)      I&O's Summary    08 Jan 2021 07:01  -  08 Jan 2021 21:06  --------------------------------------------------------  IN: 470 mL / OUT: 175 mL / NET: 295 mL      POCT Blood Glucose.: 223 mg/dL (08 Jan 2021 17:43)      PHYSICAL EXAM:   General: No acute distress  Eyes: EOMI, PERRLA, conjunctiva and sclera clear  Chest/Lung: CTAB, no wheezes, rales, or rhonchi  Heart: Tachycardic, regular rhythm. Normal S1/S2. No murmurs, rubs, or gallops.  Abdomen: Soft, nontender, nondistended. Normal bowel sounds.  Extremites: 2+ peripheral pulses B/L. No clubbing, cyanosis, or edema.  Neurology: A&O x3, no focal deficits  Skin: No rashes or lesions  ============================I/O===========================   I&O's Detail    08 Jan 2021 07:01  -  08 Jan 2021 21:06  --------------------------------------------------------  IN:    Oral Fluid: 120 mL    Sodium Chloride 0.9% Bolus: 250 mL    sodium chloride 3%: 100 mL  Total IN: 470 mL    OUT:    Indwelling Catheter - Urethral (mL): 175 mL  Total OUT: 175 mL    Total NET: 295 mL        ============================ LABS =========================                        11.4   10.57 )-----------( 173      ( 08 Jan 2021 11:44 )             34.6     01-08    119<LL>  |  86<L>  |  29<H>  ----------------------------<  212<H>  5.6<H>   |  15<L>  |  0.99    Ca    7.9<L>      08 Jan 2021 18:23  Phos  3.9     01-08  Mg     2.0     01-08    TPro  6.7  /  Alb  3.4  /  TBili  0.8  /  DBili  x   /  AST  37  /  ALT  67<H>  /  AlkPhos  64  01-08    Troponin T, High Sensitivity Result: 9 ng/L (01-08-21 @ 10:02)              LIVER FUNCTIONS - ( 08 Jan 2021 11:44 )  Alb: 3.4 g/dL / Pro: 6.7 g/dL / ALK PHOS: 64 U/L / ALT: 67 U/L / AST: 37 U/L / GGT: x           PT/INR - ( 08 Jan 2021 10:02 )   PT: 12.8 sec;   INR: 1.07 ratio         PTT - ( 08 Jan 2021 10:02 )  PTT:28.6 sec    Blood Gas Venous - Lactate: 3.8 mmoL/L (01-08-21 @ 13:20)  Lactate, Blood: 1.9 mmol/L (01-08-21 @ 11:44)  Blood Gas Venous - Lactate: 2.2 mmoL/L (01-08-21 @ 10:02)      ======================Micro/Rad/Cardio=================  Telemetry: Reviewed   EKG: Reviewed  CXR: Reviewed  Echo: Reviewed  ======================================================  PAST MEDICAL & SURGICAL HISTORY:  Calculus of gallbladder without cholecystitis without obstruction    GERD (gastroesophageal reflux disease)    Asthma  Exacerbates in the winter    DM2 (diabetes mellitus, type 2)    HLD (hyperlipidemia)    HTN (hypertension)    No significant past surgical history      ====================ASSESSMENT ==============  3rd Degree Heart Block   B/L Pleural Effusions w/ mild pulmonary edema on CXR   Hyperkalemia  Hyponatremia  DMT2    Plan:  ====================== NEUROLOGY=====================  A&Ox3, restless and confused on arrival  - May be i/s/o hyponatremia, Na 118  - Given 250cc NS bolus   - Continue to monitor neuro status closely as per protocol   ==================== RESPIRATORY======================  B/L Pleural Effusions w/ mild pulmonary edema on CXR   - SpO2 94% on RA  - Continue to monitor SpO2 via pulse oximetry    ====================CARDIOVASCULAR==================  3rd Degree Heart Block   - Transcutaneous pacer pads on chest  - Plan for PPM by EP   - Monitor hemodynamics, monitor telemetry  - Trend cardiac enzymes, lactate levels, BMPs    ===================HEMATOLOGIC/ONC ===================  H/H 11.4/34.6, stable  - Continue to monitor hemoglobin and hematocrit levels.     VTE prophylaxis   - Continue SQ Heparin for venous thromboembolism prophylaxis.     heparin   Injectable 5000 Unit(s) SubCutaneous every 8 hours  ===================== RENAL =========================  Hyponatremia, Na 118  - Restless and confused on arrival  - Given 250cc NS bolus   - Renal consulted    Hyperkalemia, K 5.6  - Lokelma 5g x1 given  - Trend CMP    sodium chloride 3%. 100 milliLiter(s) (100 mL/Hr) IV Continuous <Continuous>  ==================== GASTROINTESTINAL===================  NPO for PPM     =======================    ENDOCRINE  =====================  Hx of DMT2   - Glycemic control with Admelog sliding scale and Glucagon PRN.   - Monitor blood glucose levels.   - F/u HgbA1C level    dextrose 40% Gel 15 Gram(s) Oral once  dextrose 50% Injectable 25 Gram(s) IV Push once  dextrose 50% Injectable 12.5 Gram(s) IV Push once  dextrose 50% Injectable 25 Gram(s) IV Push once  glucagon  Injectable 1 milliGRAM(s) IntraMuscular once  insulin lispro (ADMELOG) corrective regimen sliding scale   SubCutaneous three times a day before meals  insulin lispro (ADMELOG) corrective regimen sliding scale   SubCutaneous at bedtime  ========================INFECTIOUS DISEASE================  Afebrile, WBC within normal limits.   - Monitor for leukocytosis / fever. Monitor off abx.     Patient requires continuous monitoring with bedside rhythm monitoring, pulse ox monitoring, and intermittent blood gas analysis. Care plan discussed with ICU care team. Patient remained critical and at risk for life threatening decompensation.  Patient seen, examined and plan discussed with CCU team during rounds.     I have personally provided 35 minutes of critical care time excluding time spent on separate procedures.    By signing my name below, I, Tonya Multani, attest that this documentation has been prepared under the direction and in the presence of Payal Brady NP.  Electronically signed: Regina Méndez, 01-08-21 @ 21:06    I, Payal Brady NP, personally performed the services described in this documentation. all medical record entries made by the scribe were at my direction and in my presence. I have reviewed the chart and agree that the record reflects my personal performance and is accurate and complete  Electronically signed: Payal Brady NP.     MATA CARRILLO  MRN-[id number]  Patient is a 65y old  Female who presents with a chief complaint of complete heart block (08 Jan 2021 14:20)    HPI:  65y F w/ PMHx HTN, HLD, GERD, Asthma presenting as a transfer from [place] ED in setting of complete heart block. Patient was feeling weak and tired this morning and vomited x2 and was taken to the emergency department there was found to have complete heart block. Patient denies any chest pain, shortness of breath, fever or previous episodes. Patient was well until 3 days ago when she developed generalized weakness, malaise, and swelling of her hands, face and B/L LE. She has no known history of hypothyroidism. She was taking metoprolol until November for her hx of HTN, but daughter states her prescription ran out and was unable to refill. Daughter denies known sick contacts, recent travel. Ambulates without assistive device at baseline.    ED Course: Pt administered atropine 0.5mg  x 6 by EMS prior to OSH arrival. Pt arrived to [facility] w/ HR in 30s, Hypertensive and pacer pads placed on chest.  (08 Jan 2021 11:25)      Hospital Course:  1/8 Admitted to [facility] CICU for complete heart block.     24 HOUR EVENTS:  Severe hyponatremia, s/p 100ml 3% NS bolus, NA increased to 119.  K 5.6 Lokelma 10g dose x1 given.       REVIEW OF SYSTEMS:   Constitutional: Confused   Eyes/ENT: No visual changes  Respiratory: + SOB. No cough, wheezing, hemoptysis  Cardiovascular: No chest pain, no palpitations  Gastrointestinal: + N/V. No abdominal pain. No hematemesis.   Genitourinary: No dysuria  Neurological: No numbness, no weakness  Skin: No itching, rashes    ICU Vital Signs Last 24 Hrs  T(C): 36.4 (08 Jan 2021 19:23), Max: 36.7 (08 Jan 2021 16:00)  T(F): 97.5 (08 Jan 2021 19:23), Max: 98 (08 Jan 2021 16:00)  HR: 117 (08 Jan 2021 19:23) (32 - 117)  BP: 161/66 (08 Jan 2021 19:23) (126/93 - 163/94)  BP(mean): 91 (08 Jan 2021 19:23) (88 - 115)  ABP: --  ABP(mean): --  RR: 19 (08 Jan 2021 19:23) (18 - 28)  SpO2: 94% (08 Jan 2021 19:23) (94% - 97%)      I&O's Summary    08 Jan 2021 07:01  -  08 Jan 2021 21:06  --------------------------------------------------------  IN: 470 mL / OUT: 175 mL / NET: 295 mL      POCT Blood Glucose.: 223 mg/dL (08 Jan 2021 17:43)      PHYSICAL EXAM:   General: No acute distress  Eyes: EOMI, PERRLA, conjunctiva and sclera clear  Chest/Lung: CTAB, no wheezes, rales, or rhonchi  Heart: Tachycardic, regular rhythm. Normal S1/S2. No murmurs, rubs, or gallops.  Abdomen: Soft, nontender, nondistended. Normal bowel sounds.  Extremites: 2+ peripheral pulses B/L. No clubbing, cyanosis, or edema.  Neurology: A&O x3, no focal deficits  Skin: No rashes or lesions  ============================I/O===========================   I&O's Detail    08 Jan 2021 07:01  -  08 Jan 2021 21:06  --------------------------------------------------------  IN:    Oral Fluid: 120 mL    Sodium Chloride 0.9% Bolus: 250 mL    sodium chloride 3%: 100 mL  Total IN: 470 mL    OUT:    Indwelling Catheter - Urethral (mL): 175 mL  Total OUT: 175 mL    Total NET: 295 mL        ============================ LABS =========================                        11.4   10.57 )-----------( 173      ( 08 Jan 2021 11:44 )             34.6     01-08    119<LL>  |  86<L>  |  29<H>  ----------------------------<  212<H>  5.6<H>   |  15<L>  |  0.99    Ca    7.9<L>      08 Jan 2021 18:23  Phos  3.9     01-08  Mg     2.0     01-08    TPro  6.7  /  Alb  3.4  /  TBili  0.8  /  DBili  x   /  AST  37  /  ALT  67<H>  /  AlkPhos  64  01-08    Troponin T, High Sensitivity Result: 9 ng/L (01-08-21 @ 10:02)              LIVER FUNCTIONS - ( 08 Jan 2021 11:44 )  Alb: 3.4 g/dL / Pro: 6.7 g/dL / ALK PHOS: 64 U/L / ALT: 67 U/L / AST: 37 U/L / GGT: x           PT/INR - ( 08 Jan 2021 10:02 )   PT: 12.8 sec;   INR: 1.07 ratio         PTT - ( 08 Jan 2021 10:02 )  PTT:28.6 sec    Blood Gas Venous - Lactate: 3.8 mmoL/L (01-08-21 @ 13:20)  Lactate, Blood: 1.9 mmol/L (01-08-21 @ 11:44)  Blood Gas Venous - Lactate: 2.2 mmoL/L (01-08-21 @ 10:02)      ======================Micro/Rad/Cardio=================  Telemetry: Reviewed   EKG: Reviewed  CXR: Reviewed  Echo: Reviewed  ======================================================  PAST MEDICAL & SURGICAL HISTORY:  Calculus of gallbladder without cholecystitis without obstruction    GERD (gastroesophageal reflux disease)    Asthma  Exacerbates in the winter    DM2 (diabetes mellitus, type 2)    HLD (hyperlipidemia)    HTN (hypertension)    No significant past surgical history      ====================ASSESSMENT ==============  3rd Degree Heart Block   B/L Pleural Effusions w/ mild pulmonary edema on CXR   Hyperkalemia  Hyponatremia  DMT2    Plan:  ====================== NEUROLOGY=====================  Restless and confused  - May be i/s/o hyponatremia, Na 118  - Continue to monitor neuro status closely as per protocol   -c/w 1:1 observation for safety     ==================== RESPIRATORY======================  B/L Pleural Effusions w/ mild pulmonary edema on CXR   - SpO2 94% on RA  - Continue to monitor SpO2 via pulse oximetry    ====================CARDIOVASCULAR==================  3rd Degree Heart Block   - Transcutaneous pacer pads on chest  - No Plan for PPM by EP at this time   - Monitor hemodynamics, monitor telemetry  - Trend cardiac enzymes, lactate levels, BMPs    ===================HEMATOLOGIC/ONC ===================  H/H 11.4/34.6, stable  - Continue to monitor hemoglobin and hematocrit levels.     VTE prophylaxis   - Continue SQ Heparin for venous thromboembolism prophylaxis.     heparin   Injectable 5000 Unit(s) SubCutaneous every 8 hours  ===================== RENAL =========================  Hyponatremia, Na 118  - Restless and confused on arrival  - Given 100cc 3%NS bolus, trending CMP q4-6hrs  -goal Na in the -123, if Na >123 start D5w @75ml/hr and check CMP q2h   -Fluid restriction 1L/day   - Renal consulted    Hyperkalemia, K 5.6  - Lokelma 10g x1 given  -If midnight K is still elevated would give lasix 40mg x1 IVP   - Trend CMP    sodium chloride 3%. 100 milliLiter(s) (100 mL/Hr) IV Continuous <Continuous>  ==================== GASTROINTESTINAL===================  NPO for PPM     =======================    ENDOCRINE  =====================  Hx of DMT2   - Glycemic control with Admelog sliding scale and Glucagon PRN.   - Monitor blood glucose levels.   - F/u HgbA1C level    dextrose 40% Gel 15 Gram(s) Oral once  dextrose 50% Injectable 25 Gram(s) IV Push once  dextrose 50% Injectable 12.5 Gram(s) IV Push once  dextrose 50% Injectable 25 Gram(s) IV Push once  glucagon  Injectable 1 milliGRAM(s) IntraMuscular once  insulin lispro (ADMELOG) corrective regimen sliding scale   SubCutaneous three times a day before meals  insulin lispro (ADMELOG) corrective regimen sliding scale   SubCutaneous at bedtime  ========================INFECTIOUS DISEASE================  Afebrile, WBC within normal limits.   - Monitor for leukocytosis / fever. Monitor off abx.     Patient requires continuous monitoring with bedside rhythm monitoring, pulse ox monitoring, and intermittent blood gas analysis. Care plan discussed with ICU care team. Patient remained critical and at risk for life threatening decompensation.  Patient seen, examined and plan discussed with CCU team during rounds.     I have personally provided 35 minutes of critical care time excluding time spent on separate procedures.    By signing my name below, JUAN, Tonya Multani, attest that this documentation has been prepared under the direction and in the presence of Payal Brady NP.  Electronically signed: Regina Méndez, 01-08-21 @ 21:06    Payal MARTINEZ NP, personally performed the services described in this documentation. all medical record entries made by the scribe were at my direction and in my presence. I have reviewed the chart and agree that the record reflects my personal performance and is accurate and complete  Electronically signed: Payal Brady NP.

## 2021-01-08 NOTE — CHART NOTE - NSCHARTNOTEFT_GEN_A_CORE
Nephrology follow up note    Repeat Lab show improvement of serum Na after 3% bolus increased from 115 to 119 mEq/L, elevated/unchanged serum K 5.6, sCr stable 0.9    Recommendations:  - no need further hypertonic saline given improved sNa  - for hyperkalemia, please give lokelma 10g TID x 1 day , monitor BMP every 4 hours  - repeat BMP every 4 hours, monitor UOP closely if increase UOP (Uosm <100) will need DDAVP IV 1-2 mcg to decrease UOP and avoid rapid correction hyponatremia  - goal serum Na tomorrow (1/9/21 at 10am) is 123-125 mEq/L max (goal correction 6-8 mEq/24hrs)    - next BMP at midnight 12am  if serum Na <120 would give LASIX IV 40 mg x 1 dose which will help with hyponatremia and fluid overload/hypervolemia  if repeat sNa >121 would monitor only   if >123 will need water replacement to decrease sNa to avoid rapid correction D5W or 1/2 NS at 75cc/hr and repeat BMP every 2 hours    Case discussed w/ Dr. Dowling and primary team

## 2021-01-08 NOTE — ED PROVIDER NOTE - OBJECTIVE STATEMENT
65f pmhx dm, htn, gerd. 3 days of weakness and malaise and dizziness. this AM 2 episode of nausea and vomiting. no cp or sob. on EMS arrival patient in 3rd deg heart block. 0.5mg atropine administered X6 with no imprvoement in HR. BP elevated.

## 2021-01-08 NOTE — ED PROVIDER NOTE - OBJECTIVE STATEMENT
Attending note. Patient was seen and critical care room B on arrival by EMS. The patient was transferred from [place] emergency department. Patient was feeling weak and tired this morning and vomited and was taken to the emergency department there was found to have complete heart block. Patient denies any chest pains, shortness of breath, fever or previous episodes. Patient was well until 3 days ago when she developed generalized weakness and malaise. She also developed swelling of the hands and face as well as lower extremities. She has no history of hypothyroidism. She has a history of diabetes, asthma, hypertension. She was taking metoprolol until December it was stopped. No labs were available from [place] ER.

## 2021-01-08 NOTE — ED ADULT NURSE NOTE - OBJECTIVE STATEMENT
Received pt from EMS with daughter c/o not feeling well x3 days , chest discomfort accompanied with nausea vomiting and swelling to b/l legs AND sob , stat EKG done on arrival pt noted to have 3rd degree heart block

## 2021-01-08 NOTE — ED PROVIDER NOTE - MUSCULOSKELETAL, MLM
Spine appears normal, range of motion is not limited, no muscle or joint tenderness. mild bilateral calf swelling

## 2021-01-08 NOTE — ED ADULT NURSE NOTE - OBJECTIVE STATEMENT
66 yo presents to the ED from [place] by EMS. as per EMS and daughter at bedside, reports pt has been experiencing SOB, leg swelling x 3 days. found to be third degree heart block. pt was administered atropine. pt was hypertensive. 66 yo presents to the ED from [place] by EMS. as per EMS and daughter at bedside, reports pt has been experiencing SOB, leg swelling x 3 days. found to be third degree heart block. pt was administered atropine 0.5mg x 6 by EMS prior to arriving to OSH. pt has been hypertensive. HR remains in the 30s. pt placed on pacemaker. 66 yo presents to the ED from [place] by EMS. as per EMS and daughter at bedside, reports pt has been experiencing SOB, leg swelling, dizziness, n/v x 3 days. found to be third degree heart block. pt was administered atropine 0.5mg x 6 by EMS prior to arriving to OSH. pt has been hypertensive. HR remains in the 30s. pt placed on pacer pads at bedside. EKG done at bedside. pt placed on CM. pt had 2 episodes of vomiting this AM. denies abd pain. abd soft nondistended nontender.

## 2021-01-08 NOTE — ED PROVIDER NOTE - PMH
Asthma  Exacerbates in the winter  Calculus of gallbladder without cholecystitis without obstruction    DM2 (diabetes mellitus, type 2)    GERD (gastroesophageal reflux disease)    HLD (hyperlipidemia)    HTN (hypertension)

## 2021-01-08 NOTE — ED PROVIDER NOTE - CLINICAL SUMMARY MEDICAL DECISION MAKING FREE TEXT BOX
Attn - pt transfer from [facility] for complete heart block - pacing pads, Trop, TSH, labs, Cardiology consult

## 2021-01-08 NOTE — ED ADULT NURSE NOTE - NSIMPLEMENTINTERV_GEN_ALL_ED
Implemented All Universal Safety Interventions:  [place] to call system. Call bell, personal items and telephone within reach. Instruct patient to call for assistance. Room bathroom lighting operational. Non-slip footwear when patient is off stretcher. Physically safe environment: no spills, clutter or unnecessary equipment. Stretcher in lowest position, wheels locked, appropriate side rails in place.

## 2021-01-08 NOTE — ED ADULT TRIAGE NOTE - CHIEF COMPLAINT QUOTE
Found by EMS to have a heart beat in the 30s, 3mg Atropine given by EMS, Left AC #20 IV, vomited at home with syncope, swollen face and feet x 2 days

## 2021-01-09 LAB
ALBUMIN SERPL ELPH-MCNC: 3 G/DL — LOW (ref 3.3–5)
ALBUMIN SERPL ELPH-MCNC: 3.4 G/DL — SIGNIFICANT CHANGE UP (ref 3.3–5)
ALBUMIN SERPL ELPH-MCNC: 3.5 G/DL — SIGNIFICANT CHANGE UP (ref 3.3–5)
ALP SERPL-CCNC: 66 U/L — SIGNIFICANT CHANGE UP (ref 40–120)
ALP SERPL-CCNC: 72 U/L — SIGNIFICANT CHANGE UP (ref 40–120)
ALP SERPL-CCNC: 72 U/L — SIGNIFICANT CHANGE UP (ref 40–120)
ALT FLD-CCNC: 64 U/L — HIGH (ref 10–45)
ALT FLD-CCNC: 64 U/L — HIGH (ref 10–45)
ALT FLD-CCNC: 66 U/L — HIGH (ref 10–45)
ANION GAP SERPL CALC-SCNC: 10 MMOL/L — SIGNIFICANT CHANGE UP (ref 5–17)
ANION GAP SERPL CALC-SCNC: 11 MMOL/L — SIGNIFICANT CHANGE UP (ref 5–17)
ANION GAP SERPL CALC-SCNC: 14 MMOL/L — SIGNIFICANT CHANGE UP (ref 5–17)
ANION GAP SERPL CALC-SCNC: 15 MMOL/L — SIGNIFICANT CHANGE UP (ref 5–17)
AST SERPL-CCNC: 41 U/L — HIGH (ref 10–40)
AST SERPL-CCNC: 43 U/L — HIGH (ref 10–40)
AST SERPL-CCNC: 44 U/L — HIGH (ref 10–40)
BILIRUB SERPL-MCNC: 0.7 MG/DL — SIGNIFICANT CHANGE UP (ref 0.2–1.2)
BILIRUB SERPL-MCNC: 0.8 MG/DL — SIGNIFICANT CHANGE UP (ref 0.2–1.2)
BILIRUB SERPL-MCNC: 0.8 MG/DL — SIGNIFICANT CHANGE UP (ref 0.2–1.2)
BUN SERPL-MCNC: 30 MG/DL — HIGH (ref 7–23)
BUN SERPL-MCNC: 31 MG/DL — HIGH (ref 7–23)
BUN SERPL-MCNC: 32 MG/DL — HIGH (ref 7–23)
BUN SERPL-MCNC: 34 MG/DL — HIGH (ref 7–23)
CALCIUM SERPL-MCNC: 7.6 MG/DL — LOW (ref 8.4–10.5)
CALCIUM SERPL-MCNC: 7.9 MG/DL — LOW (ref 8.4–10.5)
CALCIUM SERPL-MCNC: 7.9 MG/DL — LOW (ref 8.4–10.5)
CALCIUM SERPL-MCNC: 8.1 MG/DL — LOW (ref 8.4–10.5)
CHLORIDE SERPL-SCNC: 86 MMOL/L — LOW (ref 96–108)
CHLORIDE SERPL-SCNC: 87 MMOL/L — LOW (ref 96–108)
CHLORIDE SERPL-SCNC: 88 MMOL/L — LOW (ref 96–108)
CHLORIDE SERPL-SCNC: 88 MMOL/L — LOW (ref 96–108)
CO2 SERPL-SCNC: 18 MMOL/L — LOW (ref 22–31)
CO2 SERPL-SCNC: 20 MMOL/L — LOW (ref 22–31)
CO2 SERPL-SCNC: 22 MMOL/L — SIGNIFICANT CHANGE UP (ref 22–31)
CO2 SERPL-SCNC: 23 MMOL/L — SIGNIFICANT CHANGE UP (ref 22–31)
CREAT SERPL-MCNC: 1.01 MG/DL — SIGNIFICANT CHANGE UP (ref 0.5–1.3)
CREAT SERPL-MCNC: 1.02 MG/DL — SIGNIFICANT CHANGE UP (ref 0.5–1.3)
CREAT SERPL-MCNC: 1.1 MG/DL — SIGNIFICANT CHANGE UP (ref 0.5–1.3)
CREAT SERPL-MCNC: 1.22 MG/DL — SIGNIFICANT CHANGE UP (ref 0.5–1.3)
GLUCOSE BLDC GLUCOMTR-MCNC: 132 MG/DL — HIGH (ref 70–99)
GLUCOSE BLDC GLUCOMTR-MCNC: 157 MG/DL — HIGH (ref 70–99)
GLUCOSE BLDC GLUCOMTR-MCNC: 197 MG/DL — HIGH (ref 70–99)
GLUCOSE SERPL-MCNC: 158 MG/DL — HIGH (ref 70–99)
GLUCOSE SERPL-MCNC: 159 MG/DL — HIGH (ref 70–99)
GLUCOSE SERPL-MCNC: 170 MG/DL — HIGH (ref 70–99)
GLUCOSE SERPL-MCNC: 171 MG/DL — HIGH (ref 70–99)
HCT VFR BLD CALC: 32.3 % — LOW (ref 34.5–45)
HCV AB S/CO SERPL IA: 0.09 S/CO — SIGNIFICANT CHANGE UP (ref 0–0.99)
HCV AB SERPL-IMP: SIGNIFICANT CHANGE UP
HGB BLD-MCNC: 11 G/DL — LOW (ref 11.5–15.5)
MAGNESIUM SERPL-MCNC: 2 MG/DL — SIGNIFICANT CHANGE UP (ref 1.6–2.6)
MCHC RBC-ENTMCNC: 27.6 PG — SIGNIFICANT CHANGE UP (ref 27–34)
MCHC RBC-ENTMCNC: 34.1 GM/DL — SIGNIFICANT CHANGE UP (ref 32–36)
MCV RBC AUTO: 81.2 FL — SIGNIFICANT CHANGE UP (ref 80–100)
NRBC # BLD: 0 /100 WBCS — SIGNIFICANT CHANGE UP (ref 0–0)
OSMOLALITY UR: 285 MOS/KG — LOW (ref 300–900)
PHOSPHATE SERPL-MCNC: 3.8 MG/DL — SIGNIFICANT CHANGE UP (ref 2.5–4.5)
PHOSPHATE SERPL-MCNC: 4 MG/DL — SIGNIFICANT CHANGE UP (ref 2.5–4.5)
PLATELET # BLD AUTO: 173 K/UL — SIGNIFICANT CHANGE UP (ref 150–400)
POTASSIUM SERPL-MCNC: 4 MMOL/L — SIGNIFICANT CHANGE UP (ref 3.5–5.3)
POTASSIUM SERPL-MCNC: 4.2 MMOL/L — SIGNIFICANT CHANGE UP (ref 3.5–5.3)
POTASSIUM SERPL-MCNC: 4.8 MMOL/L — SIGNIFICANT CHANGE UP (ref 3.5–5.3)
POTASSIUM SERPL-MCNC: 5.8 MMOL/L — HIGH (ref 3.5–5.3)
POTASSIUM SERPL-SCNC: 4 MMOL/L — SIGNIFICANT CHANGE UP (ref 3.5–5.3)
POTASSIUM SERPL-SCNC: 4.2 MMOL/L — SIGNIFICANT CHANGE UP (ref 3.5–5.3)
POTASSIUM SERPL-SCNC: 4.8 MMOL/L — SIGNIFICANT CHANGE UP (ref 3.5–5.3)
POTASSIUM SERPL-SCNC: 5.8 MMOL/L — HIGH (ref 3.5–5.3)
PROT SERPL-MCNC: 6.3 G/DL — SIGNIFICANT CHANGE UP (ref 6–8.3)
PROT SERPL-MCNC: 6.5 G/DL — SIGNIFICANT CHANGE UP (ref 6–8.3)
PROT SERPL-MCNC: 6.9 G/DL — SIGNIFICANT CHANGE UP (ref 6–8.3)
RBC # BLD: 3.98 M/UL — SIGNIFICANT CHANGE UP (ref 3.8–5.2)
RBC # FLD: 12.4 % — SIGNIFICANT CHANGE UP (ref 10.3–14.5)
SODIUM SERPL-SCNC: 119 MMOL/L — CRITICAL LOW (ref 135–145)
SODIUM SERPL-SCNC: 121 MMOL/L — LOW (ref 135–145)
WBC # BLD: 11.58 K/UL — HIGH (ref 3.8–10.5)
WBC # FLD AUTO: 11.58 K/UL — HIGH (ref 3.8–10.5)

## 2021-01-09 PROCEDURE — 99291 CRITICAL CARE FIRST HOUR: CPT

## 2021-01-09 PROCEDURE — 36556 INSERT NON-TUNNEL CV CATH: CPT

## 2021-01-09 PROCEDURE — 93010 ELECTROCARDIOGRAM REPORT: CPT

## 2021-01-09 PROCEDURE — 99291 CRITICAL CARE FIRST HOUR: CPT | Mod: 25

## 2021-01-09 PROCEDURE — 71045 X-RAY EXAM CHEST 1 VIEW: CPT | Mod: 26

## 2021-01-09 PROCEDURE — 99233 SBSQ HOSP IP/OBS HIGH 50: CPT

## 2021-01-09 RX ORDER — AMLODIPINE BESYLATE 2.5 MG/1
5 TABLET ORAL DAILY
Refills: 0 | Status: DISCONTINUED | OUTPATIENT
Start: 2021-01-09 | End: 2021-01-11

## 2021-01-09 RX ORDER — VANCOMYCIN HCL 1 G
1000 VIAL (EA) INTRAVENOUS ONCE
Refills: 0 | Status: COMPLETED | OUTPATIENT
Start: 2021-01-10 | End: 2021-01-10

## 2021-01-09 RX ORDER — ATORVASTATIN CALCIUM 80 MG/1
1 TABLET, FILM COATED ORAL
Qty: 0 | Refills: 0 | DISCHARGE

## 2021-01-09 RX ORDER — GABAPENTIN 400 MG/1
1 CAPSULE ORAL
Qty: 0 | Refills: 0 | DISCHARGE

## 2021-01-09 RX ORDER — LANOLIN ALCOHOL/MO/W.PET/CERES
5 CREAM (GRAM) TOPICAL AT BEDTIME
Refills: 0 | Status: DISCONTINUED | OUTPATIENT
Start: 2021-01-09 | End: 2021-01-11

## 2021-01-09 RX ORDER — HYDRALAZINE HCL 50 MG
5 TABLET ORAL ONCE
Refills: 0 | Status: COMPLETED | OUTPATIENT
Start: 2021-01-09 | End: 2021-01-09

## 2021-01-09 RX ORDER — FUROSEMIDE 40 MG
40 TABLET ORAL ONCE
Refills: 0 | Status: COMPLETED | OUTPATIENT
Start: 2021-01-09 | End: 2021-01-09

## 2021-01-09 RX ORDER — ONDANSETRON 8 MG/1
4 TABLET, FILM COATED ORAL ONCE
Refills: 0 | Status: COMPLETED | OUTPATIENT
Start: 2021-01-09 | End: 2021-01-09

## 2021-01-09 RX ORDER — SODIUM ZIRCONIUM CYCLOSILICATE 10 G/10G
10 POWDER, FOR SUSPENSION ORAL THREE TIMES A DAY
Refills: 0 | Status: COMPLETED | OUTPATIENT
Start: 2021-01-09 | End: 2021-01-09

## 2021-01-09 RX ORDER — HYDRALAZINE HCL 50 MG
10 TABLET ORAL DAILY
Refills: 0 | Status: DISCONTINUED | OUTPATIENT
Start: 2021-01-09 | End: 2021-01-10

## 2021-01-09 RX ORDER — ALBUTEROL 90 UG/1
2 AEROSOL, METERED ORAL
Qty: 0 | Refills: 0 | DISCHARGE

## 2021-01-09 RX ORDER — ASPIRIN/CALCIUM CARB/MAGNESIUM 324 MG
1 TABLET ORAL
Qty: 0 | Refills: 0 | DISCHARGE

## 2021-01-09 RX ORDER — FENTANYL CITRATE 50 UG/ML
25 INJECTION INTRAVENOUS ONCE
Refills: 0 | Status: DISCONTINUED | OUTPATIENT
Start: 2021-01-09 | End: 2021-01-09

## 2021-01-09 RX ADMIN — Medication 40 MILLIGRAM(S): at 16:24

## 2021-01-09 RX ADMIN — Medication 40 MILLIGRAM(S): at 04:46

## 2021-01-09 RX ADMIN — HEPARIN SODIUM 5000 UNIT(S): 5000 INJECTION INTRAVENOUS; SUBCUTANEOUS at 14:33

## 2021-01-09 RX ADMIN — CHLORHEXIDINE GLUCONATE 1 APPLICATION(S): 213 SOLUTION TOPICAL at 05:45

## 2021-01-09 RX ADMIN — HEPARIN SODIUM 5000 UNIT(S): 5000 INJECTION INTRAVENOUS; SUBCUTANEOUS at 05:41

## 2021-01-09 RX ADMIN — Medication 10 MILLIGRAM(S): at 22:32

## 2021-01-09 RX ADMIN — Medication 1: at 18:25

## 2021-01-09 RX ADMIN — FENTANYL CITRATE 25 MICROGRAM(S): 50 INJECTION INTRAVENOUS at 00:53

## 2021-01-09 RX ADMIN — ONDANSETRON 4 MILLIGRAM(S): 8 TABLET, FILM COATED ORAL at 06:09

## 2021-01-09 RX ADMIN — HEPARIN SODIUM 5000 UNIT(S): 5000 INJECTION INTRAVENOUS; SUBCUTANEOUS at 22:32

## 2021-01-09 RX ADMIN — Medication 1: at 08:13

## 2021-01-09 RX ADMIN — Medication 5 MILLIGRAM(S): at 13:28

## 2021-01-09 RX ADMIN — AMLODIPINE BESYLATE 5 MILLIGRAM(S): 2.5 TABLET ORAL at 11:52

## 2021-01-09 RX ADMIN — Medication 5 MILLIGRAM(S): at 22:31

## 2021-01-09 RX ADMIN — SODIUM ZIRCONIUM CYCLOSILICATE 10 GRAM(S): 10 POWDER, FOR SUSPENSION ORAL at 05:41

## 2021-01-09 NOTE — PROGRESS NOTE ADULT - ASSESSMENT
65F PMHx HTN, HLD, GERD, Asthma who was transferred from [place] ED to [facility] for complete heart block. Nephrology consulted for hyponatremia (117).      # Hyponatremia-hypervolemic  Pt with hyponatremia hypervolemia likely 2/2 heart failure from complete heart block causing high ADH and/or low solute intake (tea and toast diet) or  possible SIADH in setting nausea/vomiting, oxybutynin.  On admission serum Na 117 on 1/8/21 (prior sNa 144 on 12/22/20) dropped to 115 after NS bolus consistent w/ high ADH state.  Labs noted for Uosm 395, sOsm 268, Atul<35, pro BNP 6076.  improving with 3% yesterday and lasix IV this am     Recommendations:   - FLUID RESTRICTION 1 liter per day, avoid hypotonic solution (1/2 NS, D5W) including additive in IV medications if possible  - closely monitor UOP   - repeat sodium this afternoon; if trending down or stable ok to give another dose of lasix this afternoon  - Glycemic control, HOLD oxybutynin, avoid any thiazide or SSRI  - check TSH/FT4, morning cortisol level  - treat nausea/vomiting/pain symptoms to decrease possibly ADH triggers       # Hyperkalemia   -s/p lokelma  -k improving  - monitor BMP, low K diet    # Acidosis  Pt with acidosis, primary respiratory acidosis in the setting pulmonary edema +mild metabolic.  On admission vbg pH 7.25, pCO2 53, HCO3 23, lactate 2.2>3.8  - treat underlying respiratory process, would hold off on bicarbonate for now given pulmonary edema/fluid overload.     - monitor blood gas  -monitor lactate

## 2021-01-09 NOTE — PROGRESS NOTE ADULT - SUBJECTIVE AND OBJECTIVE BOX
MATA CARRILLO  MRN-[id number]  Patient is a 65y old  Female who presents with a chief complaint of Complete heart block (08 Jan 2021 21:06)    HPI:  65y F w/ PMHx HTN, HLD, GERD, Asthma presenting as a transfer from [place] ED in setting of complete heart block. Patient was feeling weak and tired this morning and vomited x2 and was taken to the emergency department there was found to have complete heart block. Patient denies any chest pain, shortness of breath, fever or previous episodes. Patient was well until 3 days ago when she developed generalized weakness, malaise, and swelling of her hands, face and B/L LE. She has no known history of hypothyroidism. She was taking metoprolol until November for her hx of HTN, but daughter states her prescription ran out and was unable to refill. Daughter denies known sick contacts, recent travel. Ambulates without assistive device at baseline.    ED Course: Pt administered atropine 0.5mg  x 6 by EMS prior to OSH arrival. Pt arrived to [facility] w/ HR in 30s, Hypertensive and pacer pads placed on chest.  (08 Jan 2021 11:25)      24 HOUR EVENTS:    REVIEW OF SYSTEMS:    CONSTITUTIONAL: No weakness, fevers or chills  EYES/ENT: No visual changes;  No vertigo or throat pain   NECK: No pain or stiffness  RESPIRATORY: No cough, wheezing, hemoptysis; No shortness of breath  CARDIOVASCULAR: No chest pain or palpitations  GASTROINTESTINAL: No abdominal or epigastric pain. No nausea, vomiting, or hematemesis; No diarrhea or constipation. No melena or hematochezia.  GENITOURINARY: No dysuria, frequency or hematuria  NEUROLOGICAL: No numbness or weakness  SKIN: No itching, rashes      ICU Vital Signs Last 24 Hrs  T(C): 37.1 (09 Jan 2021 03:31), Max: 37.1 (09 Jan 2021 03:31)  T(F): 98.8 (09 Jan 2021 03:31), Max: 98.8 (09 Jan 2021 03:31)  HR: 60 (09 Jan 2021 03:31) (32 - 117)  BP: 133/81 (09 Jan 2021 03:31) (126/93 - 173/56)  BP(mean): 90 (09 Jan 2021 03:31) (86 - 115)  ABP: --  ABP(mean): --  RR: 18 (09 Jan 2021 03:31) (16 - 28)  SpO2: 96% (09 Jan 2021 03:31) (94% - 99%)      PHYSICAL EXAM:  GENERAL: No acute distress, well-developed  HEAD:  Atraumatic, Normocephalic  EYES: EOMI, PERRLA, conjunctiva and sclera clear  NECK: Supple, no lymphadenopathy, no JVD  CHEST/LUNG: CTAB; No wheezes, rales, or rhonchi  HEART: Regular rate and rhythm. Normal S1/S2. No murmurs, rubs, or gallops  ABDOMEN: Soft, non-tender, non-distended; normal bowel sounds, no organomegaly  EXTREMITIES:  2+ peripheral pulses b/l, No clubbing, cyanosis, or edema  NEUROLOGY: A&O x 3, no focal deficits  SKIN: No rashes or lesions    ============================I/O===========================   I&O's Detail    08 Jan 2021 07:01  -  09 Jan 2021 06:53  --------------------------------------------------------  IN:    Oral Fluid: 120 mL    Sodium Chloride 0.9% Bolus: 250 mL    sodium chloride 3%: 100 mL  Total IN: 470 mL    OUT:    Indwelling Catheter - Urethral (mL): 940 mL  Total OUT: 940 mL    Total NET: -470 mL    ============================ LABS =========================                        11.0   11.58 )-----------( 173      ( 09 Jan 2021 01:26 )             32.3     01-09    119<LL>  |  87<L>  |  34<H>  ----------------------------<  171<H>  5.8<H>   |  18<L>  |  1.22    Ca    7.6<L>      09 Jan 2021 01:26  Phos  4.0     01-09  Mg     2.0     01-08    TPro  6.5  /  Alb  3.4  /  TBili  0.8  /  DBili  x   /  AST  41<H>  /  ALT  64<H>  /  AlkPhos  66  01-09    Troponin T, High Sensitivity Result: 9 ng/L (01-08-21 @ 10:02)  LIVER FUNCTIONS - ( 09 Jan 2021 01:26 )  Alb: 3.4 g/dL / Pro: 6.5 g/dL / ALK PHOS: 66 U/L / ALT: 64 U/L / AST: 41 U/L / GGT: x           PT/INR - ( 08 Jan 2021 10:02 )   PT: 12.8 sec;   INR: 1.07 ratio         PTT - ( 08 Jan 2021 10:02 )  PTT:28.6 sec    Blood Gas Venous - Lactate: 3.8 mmoL/L (01-08-21 @ 13:20)  Lactate, Blood: 1.9 mmol/L (01-08-21 @ 11:44)  Blood Gas Venous - Lactate: 2.2 mmoL/L (01-08-21 @ 10:02)      ======================Micro/Rad/Cardio=================  Telemetry: Reviewed   EKG: Reviewed  CXR: Reviewed  Culture: Reviewed   Echo:   Cath:   ======================================================  PAST MEDICAL & SURGICAL HISTORY:  Calculus of gallbladder without cholecystitis without obstruction    GERD (gastroesophageal reflux disease)    Asthma  Exacerbates in the winter    DM2 (diabetes mellitus, type 2)    HLD (hyperlipidemia)    HTN (hypertension)    No significant past surgical history      ====================ASSESSMENT AND PLAN==============  3rd Degree Heart Block   B/L Pleural Effusions w/ mild pulmonary edema on CXR   Hyperkalemia  Hyponatremia  DMT2    Plan:  ====================== NEUROLOGY=====================  Restless and confused  - May be i/s/o hyponatremia, Na 118  - Continue to monitor neuro status closely as per protocol   -c/w 1:1 observation for safety     ==================== RESPIRATORY======================  B/L Pleural Effusions w/ mild pulmonary edema on CXR   - SpO2 94% on RA  - Continue to monitor SpO2 via pulse oximetry    ====================CARDIOVASCULAR==================  3rd Degree Heart Block   - Transcutaneous pacer pads on chest  - No Plan for PPM by EP at this time   - Monitor hemodynamics, monitor telemetry  - Trend cardiac enzymes, lactate levels, BMPs    ===================HEMATOLOGIC/ONC ===================  H/H 11.4/34.6, stable  - Continue to monitor hemoglobin and hematocrit levels.     VTE prophylaxis   - Continue SQ Heparin for venous thromboembolism prophylaxis.     heparin   Injectable 5000 Unit(s) SubCutaneous every 8 hours  ===================== RENAL =========================  Hyponatremia, Na 118  - Restless and confused on arrival  - Given 100cc 3%NS bolus, trending CMP q4-6hrs  -goal Na in the -123, if Na >123 start D5w @75ml/hr and check CMP q2h   -Fluid restriction 1L/day   - Renal consulted    Hyperkalemia, K 5.6  - Lokelma 10g x1 given  -If midnight K is still elevated would give lasix 40mg x1 IVP   - Trend CMP    sodium chloride 3%. 100 milliLiter(s) (100 mL/Hr) IV Continuous <Continuous>  ==================== GASTROINTESTINAL===================  NPO for PPM     =======================    ENDOCRINE  =====================  Hx of DMT2   - Glycemic control with Admelog sliding scale and Glucagon PRN.   - Monitor blood glucose levels.   - F/u HgbA1C level    dextrose 40% Gel 15 Gram(s) Oral once  dextrose 50% Injectable 25 Gram(s) IV Push once  dextrose 50% Injectable 12.5 Gram(s) IV Push once  dextrose 50% Injectable 25 Gram(s) IV Push once  glucagon  Injectable 1 milliGRAM(s) IntraMuscular once  insulin lispro (ADMELOG) corrective regimen sliding scale   SubCutaneous three times a day before meals  insulin lispro (ADMELOG) corrective regimen sliding scale   SubCutaneous at bedtime  ========================INFECTIOUS DISEASE================  Afebrile, WBC within normal limits.   - Monitor for leukocytosis / fever. Monitor off abx.      MATA CARRILLO  MRN-[id number]  Patient is a 65y old  Female who presents with a chief complaint of Complete heart block (08 Jan 2021 21:06)    HPI:  65y F w/ PMHx HTN, HLD, GERD, Asthma presenting as a transfer from [place] ED in setting of complete heart block. Patient was feeling weak and tired this morning and vomited x2 and was taken to the emergency department there was found to have complete heart block. Patient denies any chest pain, shortness of breath, fever or previous episodes. Patient was well until 3 days ago when she developed generalized weakness, malaise, and swelling of her hands, face and B/L LE. She has no known history of hypothyroidism. She was taking metoprolol until November for her hx of HTN, but daughter states her prescription ran out and was unable to refill. Daughter denies known sick contacts, recent travel. Ambulates without assistive device at baseline.    ED Course: Pt administered atropine 0.5mg  x 6 by EMS prior to OSH arrival. Pt arrived to [facility] w/ HR in 30s, Hypertensive and pacer pads placed on chest.  (08 Jan 2021 11:25)      24 HOUR EVENTS:  -Long pauses on tele w/ drop in BP from SBP 170s to 110s, TVP placed, BP improved   -Lokelma x2 doses, hyperkalemia resolved  -Na improved s/p 3% saline 115-->119-->121, mental status improved     REVIEW OF SYSTEMS:  CONSTITUTIONAL: No weakness, fevers or chills  EYES/ENT: No visual changes;  No vertigo or throat pain   NECK: No pain or stiffness  RESPIRATORY: No cough, wheezing, hemoptysis; No shortness of breath  CARDIOVASCULAR: No chest pain or palpitations  GASTROINTESTINAL: No abdominal or epigastric pain. No nausea, vomiting, or hematemesis; No diarrhea or constipation. No melena or hematochezia.  GENITOURINARY: No dysuria, frequency or hematuria  NEUROLOGICAL: No numbness or weakness  SKIN: No itching, rashes      ICU Vital Signs Last 24 Hrs  T(C): 37.1 (09 Jan 2021 03:31), Max: 37.1 (09 Jan 2021 03:31)  T(F): 98.8 (09 Jan 2021 03:31), Max: 98.8 (09 Jan 2021 03:31)  HR: 60 (09 Jan 2021 03:31) (32 - 117)  BP: 133/81 (09 Jan 2021 03:31) (126/93 - 173/56)  BP(mean): 90 (09 Jan 2021 03:31) (86 - 115)  ABP: --  ABP(mean): --  RR: 18 (09 Jan 2021 03:31) (16 - 28)  SpO2: 96% (09 Jan 2021 03:31) (94% - 99%)      PHYSICAL EXAM:  GENERAL: No acute distress, well-developed  HEAD:  Atraumatic, Normocephalic  EYES: EOMI, PERRL, conjunctiva and sclera clear  NECK: Supple, no lymphadenopathy, no JVD  CHEST/LUNG: moderate crackles to B/L lower lung fields   HEART: ventricularly paced. Normal S1/S2. No murmurs, rubs, or gallops  ABDOMEN: Soft, non-tender, non-distended; normal bowel sounds, no organomegaly  EXTREMITIES:  2+ peripheral pulses b/l, No clubbing, cyanosis, 1+ pitting edema to B/L LE  NEUROLOGY: A&O x 3, no focal deficits  SKIN: No rashes or lesions    ============================I/O===========================   I&O's Detail    08 Jan 2021 07:01  -  09 Jan 2021 06:53  --------------------------------------------------------  IN:    Oral Fluid: 120 mL    Sodium Chloride 0.9% Bolus: 250 mL    sodium chloride 3%: 100 mL  Total IN: 470 mL    OUT:    Indwelling Catheter - Urethral (mL): 940 mL  Total OUT: 940 mL    Total NET: -470 mL    ============================ LABS =========================                        11.0   11.58 )-----------( 173      ( 09 Jan 2021 01:26 )             32.3     01-09    119<LL>  |  87<L>  |  34<H>  ----------------------------<  171<H>  5.8<H>   |  18<L>  |  1.22    Ca    7.6<L>      09 Jan 2021 01:26  Phos  4.0     01-09  Mg     2.0     01-08    TPro  6.5  /  Alb  3.4  /  TBili  0.8  /  DBili  x   /  AST  41<H>  /  ALT  64<H>  /  AlkPhos  66  01-09    Troponin T, High Sensitivity Result: 9 ng/L (01-08-21 @ 10:02)  LIVER FUNCTIONS - ( 09 Jan 2021 01:26 )  Alb: 3.4 g/dL / Pro: 6.5 g/dL / ALK PHOS: 66 U/L / ALT: 64 U/L / AST: 41 U/L / GGT: x           PT/INR - ( 08 Jan 2021 10:02 )   PT: 12.8 sec;   INR: 1.07 ratio         PTT - ( 08 Jan 2021 10:02 )  PTT:28.6 sec    Blood Gas Venous - Lactate: 3.8 mmoL/L (01-08-21 @ 13:20)  Lactate, Blood: 1.9 mmol/L (01-08-21 @ 11:44)  Blood Gas Venous - Lactate: 2.2 mmoL/L (01-08-21 @ 10:02)      ======================Micro/Rad/Cardio=================  Telemetry: Reviewed   EKG: Reviewed  CXR: Reviewed  Culture: Reviewed   Echo:   Cath:   ======================================================  PAST MEDICAL & SURGICAL HISTORY:  Calculus of gallbladder without cholecystitis without obstruction    GERD (gastroesophageal reflux disease)    Asthma  Exacerbates in the winter    DM2 (diabetes mellitus, type 2)    HLD (hyperlipidemia)    HTN (hypertension)    No significant past surgical history      ====================ASSESSMENT AND PLAN==============  3rd Degree Heart Block   B/L Pleural Effusions w/ mild pulmonary edema on CXR   Hyperkalemia  Hyponatremia  DMT2    Plan:  ====================== NEUROLOGY=====================  A&Ox3  -Mental status significantly improved s/p hypertonic saline infusion and Na correction  -Continue to monitor mental status per protocol     ==================== RESPIRATORY======================  B/L Pleural Effusions w/ mild pulmonary edema on CXR   - SpO2 94% on RA  - Continue to monitor SpO2 via pulse oximetry    ====================CARDIOVASCULAR==================  3rd Degree Heart Block   - TVP placed for episodes of long pauses on tele and hypotension, BP improved   - PPM Monday 1/10  - Monitor hemodynamics, monitor telemetry  - Trend cardiac enzymes, lactate levels, BMPs    Hypertension  -C/w Amlodipine 5mg daily     ===================HEMATOLOGIC/ONC ===================  H/H stable  - Continue to monitor hemoglobin and hematocrit levels.     VTE prophylaxis   - Continue SQ Heparin for venous thromboembolism prophylaxis.     heparin   Injectable 5000 Unit(s) SubCutaneous every 8 hours  ===================== RENAL =========================  Hyponatremia, Na 118  - Restless and confused on arrival  - Given 100cc 3%NS bolus, trending CMP q4-6hrs  -goal Na in the -123, if Na >123 start D5w @75ml/hr and check CMP q2h   -Fluid restriction 1L/day   -F/u renal recs   -If Na plateaus or decreases from 121, give second dose of IV lasix 40mg     Hyperkalemia, K 5.6  - Lokelma 10g x2 given  - Lasix 40mg x1 IVP   -Hyperkalemia resolved, continue to trend CMP    sodium chloride 3%. 100 milliLiter(s) (100 mL/Hr) IV Continuous <Continuous>  ==================== GASTROINTESTINAL===================  -Tolerating Consistent Carb Diet   -NPO at midnight for PPM 1/10    =======================    ENDOCRINE  =====================  Hx of DMT2   - Glycemic control with Admelog sliding scale and Glucagon PRN.   - Monitor blood glucose levels.   - F/u HgbA1C level    dextrose 40% Gel 15 Gram(s) Oral once  dextrose 50% Injectable 25 Gram(s) IV Push once  dextrose 50% Injectable 12.5 Gram(s) IV Push once  dextrose 50% Injectable 25 Gram(s) IV Push once  glucagon  Injectable 1 milliGRAM(s) IntraMuscular once  insulin lispro (ADMELOG) corrective regimen sliding scale   SubCutaneous three times a day before meals  insulin lispro (ADMELOG) corrective regimen sliding scale   SubCutaneous at bedtime    ========================INFECTIOUS DISEASE================  Afebrile, WBC within normal limits.   - Monitor for leukocytosis / fever. Monitor off abx.

## 2021-01-09 NOTE — PROCEDURE NOTE - ADDITIONAL PROCEDURE DETAILS
Nolan Ni MD  Cardiology Fellow PGY-4  [facility]    For all consults and questions:  [email or web address]   Login: [email or web address]

## 2021-01-09 NOTE — PROGRESS NOTE ADULT - SUBJECTIVE AND OBJECTIVE BOX
MATA CARRILLO  MRN-[id number]  Patient is a 65y old  Female who presents with a chief complaint of Complete heart block (08 Jan 2021 21:06)    HPI:  65y F w/ PMHx HTN, HLD, GERD, Asthma presenting as a transfer from [facility] in setting of complete heart block. Patient was feeling weak and tired this morning and vomited x2 and was taken to the emergency department there was found to have complete heart block. Patient denies any chest pain, shortness of breath, fever or previous episodes. Patient was well until 3 days ago when she developed generalized weakness, malaise, and swelling of her hands, face and B/L LE. She has no known history of hypothyroidism. She was taking metoprolol until November for her hx of HTN, but daughter states her prescription ran out and was unable to refill. Daughter denies known sick contacts, recent travel. Ambulates without assistive device at baseline.    ED Course: Pt administered atropine 0.5mg  x 6 by EMS prior to OSH arrival. Pt arrived to [facility] w/ HR in 30s, Hypertensive and pacer pads placed on chest.  (08 Jan 2021 11:25)      Hospital Course:    24 HOUR EVENTS:    REVIEW OF SYSTEMS:   Constitutional: No weakness, fevers, or chills  Eyes/ENT: No visual changes  Respiratory: No cough, wheezing, hemoptysis  Cardiovascular: No chest pain, no palpitations  Gastrointestinal: No abdominal pain. No nausea, vomiting, hematemesis.   Genitourinary: No dysuria  Neurological: No numbness, no weakness  Skin: No itching, rashes    ICU Vital Signs Last 24 Hrs  T(C): 37.1 (09 Jan 2021 17:30), Max: 37.1 (09 Jan 2021 03:31)  T(F): 98.8 (09 Jan 2021 17:30), Max: 98.8 (09 Jan 2021 03:31)  HR: 60 (09 Jan 2021 19:00) (32 - 114)  BP: 115/60 (09 Jan 2021 19:00) (115/60 - 173/56)  BP(mean): 68 (09 Jan 2021 19:00) (68 - 90)  ABP: --  ABP(mean): --  RR: 22 (09 Jan 2021 19:00) (16 - 25)  SpO2: 96% (09 Jan 2021 19:00) (94% - 99%)      CVP(mm Hg): --  CO: --  CI: --  PA: --  PA(mean): --  PA(direct): --  PCWP: --  LA: --  RA: --  SVR: --  SVRI: --  PVR: --  PVRI: --  I&O's Summary    08 Jan 2021 07:01  -  09 Jan 2021 07:00  --------------------------------------------------------  IN: 470 mL / OUT: 940 mL / NET: -470 mL    09 Jan 2021 07:01  -  09 Jan 2021 22:07  --------------------------------------------------------  IN: 655 mL / OUT: 1750 mL / NET: -1095 mL        CAPILLARY BLOOD GLUCOSE    CAPILLARY BLOOD GLUCOSE      POCT Blood Glucose.: 157 mg/dL (09 Jan 2021 18:18)      PHYSICAL EXAM:   General: No acute distress  Eyes: EOMI, PERRLA, conjunctiva and sclera clear  Chest/Lung: CTAB, no wheezes, rales, or rhonchi  Heart: Regular rate, regular rhythm. Normal S1/S2. No murmurs, rubs, or gallops.  Abdomen: Soft, nontender, nondistended. Normal bowel sounds.  Extremites: 2+ peripheral pulses B/L. No clubbing, cyanosis, or edema.  Neurology: A&O x3, no focal deficits  Skin: No rashes or lesions  ============================I/O===========================   I&O's Detail    08 Jan 2021 07:01  -  09 Jan 2021 07:00  --------------------------------------------------------  IN:    Oral Fluid: 120 mL    Sodium Chloride 0.9% Bolus: 250 mL    sodium chloride 3%: 100 mL  Total IN: 470 mL    OUT:    Indwelling Catheter - Urethral (mL): 940 mL  Total OUT: 940 mL    Total NET: -470 mL      09 Jan 2021 07:01  -  09 Jan 2021 22:07  --------------------------------------------------------  IN:    Oral Fluid: 600 mL    sodium chloride 3%: 55 mL  Total IN: 655 mL    OUT:    Indwelling Catheter - Urethral (mL): 1750 mL  Total OUT: 1750 mL    Total NET: -1095 mL        ============================ LABS =========================                        11.0   11.58 )-----------( 173      ( 09 Jan 2021 01:26 )             32.3     01-09    121<L>  |  88<L>  |  31<H>  ----------------------------<  170<H>  4.0   |  23  |  1.01    Ca    7.9<L>      09 Jan 2021 21:02  Phos  3.8     01-09  Mg     2.0     01-09    TPro  6.3  /  Alb  3.0<L>  /  TBili  0.7  /  DBili  x   /  AST  43<H>  /  ALT  64<H>  /  AlkPhos  72  01-09    Troponin T, High Sensitivity Result: 9 ng/L (01-08-21 @ 10:02)              LIVER FUNCTIONS - ( 09 Jan 2021 21:02 )  Alb: 3.0 g/dL / Pro: 6.3 g/dL / ALK PHOS: 72 U/L / ALT: 64 U/L / AST: 43 U/L / GGT: x           PT/INR - ( 08 Jan 2021 10:02 )   PT: 12.8 sec;   INR: 1.07 ratio         PTT - ( 08 Jan 2021 10:02 )  PTT:28.6 sec    Blood Gas Venous - Lactate: 3.8 mmoL/L (01-08-21 @ 13:20)  Lactate, Blood: 1.9 mmol/L (01-08-21 @ 11:44)  Blood Gas Venous - Lactate: 2.2 mmoL/L (01-08-21 @ 10:02)      ======================Micro/Rad/Cardio=================  Telemetry: Reviewed   EKG: Reviewed  CXR: Reviewed  Culture: Reviewed   Echo: Reviewed  Cath: Reviewed  ======================================================  PAST MEDICAL & SURGICAL HISTORY:  Asthma    GERD (gastroesophageal reflux disease)    HLD (hyperlipidemia)    HTN (hypertension)    Calculus of gallbladder without cholecystitis without obstruction    GERD (gastroesophageal reflux disease)    Asthma  Exacerbates in the winter    DM2 (diabetes mellitus, type 2)    HLD (hyperlipidemia)    HTN (hypertension)    No significant past surgical history      ====================ASSESSMENT ==============    Plan:  ====================== NEUROLOGY=====================  melatonin 5 milliGRAM(s) Oral at bedtime PRN Sleep    ==================== RESPIRATORY======================  Mechanical Ventilation:      ====================CARDIOVASCULAR==================  amLODIPine   Tablet 5 milliGRAM(s) Oral daily  hydrALAZINE 10 milliGRAM(s) Oral daily    ===================HEMATOLOGIC/ONC ===================  heparin   Injectable 5000 Unit(s) SubCutaneous every 8 hours    ===================== RENAL =========================  Continue monitoring urine output    ==================== GASTROINTESTINAL===================  sodium chloride 3%. 100 milliLiter(s) (100 mL/Hr) IV Continuous <Continuous>    =======================    ENDOCRINE  =====================  dextrose 40% Gel 15 Gram(s) Oral once  dextrose 50% Injectable 25 Gram(s) IV Push once  dextrose 50% Injectable 12.5 Gram(s) IV Push once  dextrose 50% Injectable 25 Gram(s) IV Push once  glucagon  Injectable 1 milliGRAM(s) IntraMuscular once  insulin lispro (ADMELOG) corrective regimen sliding scale   SubCutaneous three times a day before meals  insulin lispro (ADMELOG) corrective regimen sliding scale   SubCutaneous at bedtime    ========================INFECTIOUS DISEASE================      Patient requires continuous monitoring with bedside rhythm monitoring, pulse ox monitoring, and intermittent blood gas analysis. Care plan discussed with ICU care team. Patient remained critical and at risk for life threatening decompensation.  Patient seen, examined and plan discussed with CCU team during rounds.     I have personally provided 35 minutes of critical care time excluding time spent on separate procedures.    By signing my name below, I, Cinthia Wild, attest that this documentation has been prepared under the direction and in the presence of _____  Electronically signed: Regina Castillo, 01-09-21 @ 22:07    I, ____ , personally performed the services described in this documentation. all medical record entries made by the scribe were at my direction and in my presence. I have reviewed the chart and agree that the record reflects my personal performance and is accurate and complete  Electronically signed: ______       MATA CARRILLO  MRN-[id number]  Patient is a 65y old  Female who presents with a chief complaint of Complete heart block (08 Jan 2021 21:06)    HPI:  65y F w/ PMHx HTN, HLD, GERD, Asthma presenting as a transfer from [place] ED in setting of complete heart block. Patient was feeling weak and tired this morning and vomited x2 and was taken to the emergency department there was found to have complete heart block. Patient denies any chest pain, shortness of breath, fever or previous episodes. Patient was well until 3 days ago when she developed generalized weakness, malaise, and swelling of her hands, face and B/L LE. She has no known history of hypothyroidism. She was taking metoprolol until November for her hx of HTN, but daughter states her prescription ran out and was unable to refill. Daughter denies known sick contacts, recent travel. Ambulates without assistive device at baseline.    ED Course: Pt administered atropine 0.5mg  x 6 by EMS prior to OSH arrival. Pt arrived to [facility] w/ HR in 30s, Hypertensive and pacer pads placed on chest.  (08 Jan 2021 11:25)      Hospital Course:  1/8 Admitted to [facility] CICU for complete heart block. Severe hyponatremia, s/p 100ml 3% NS bolus,NA increased to 119.  K 5.6 Lokelma 10g dose x1 given.   1/9 TVP Placed    24 HOUR EVENTS:    REVIEW OF SYSTEMS:   Constitutional: Confused   Eyes/ENT: No visual changes  Respiratory: + SOB. No cough, wheezing, hemoptysis  Cardiovascular: No chest pain, no palpitations  Gastrointestinal: + N/V. No abdominal pain. No hematemesis.   Genitourinary: No dysuria  Neurological: No numbness, no weakness  Skin: No itching, rashes      ICU Vital Signs Last 24 Hrs  T(C): 37.1 (09 Jan 2021 17:30), Max: 37.1 (09 Jan 2021 03:31)  T(F): 98.8 (09 Jan 2021 17:30), Max: 98.8 (09 Jan 2021 03:31)  HR: 60 (09 Jan 2021 19:00) (32 - 114)  BP: 115/60 (09 Jan 2021 19:00) (115/60 - 173/56)  BP(mean): 68 (09 Jan 2021 19:00) (68 - 90)  ABP: --  ABP(mean): --  RR: 22 (09 Jan 2021 19:00) (16 - 25)  SpO2: 96% (09 Jan 2021 19:00) (94% - 99%)    I&O's Summary    08 Jan 2021 07:01  -  09 Jan 2021 07:00  --------------------------------------------------------  IN: 470 mL / OUT: 940 mL / NET: -470 mL    09 Jan 2021 07:01  -  09 Jan 2021 22:07  --------------------------------------------------------  IN: 655 mL / OUT: 1750 mL / NET: -1095 mL      POCT Blood Glucose.: 157 mg/dL (09 Jan 2021 18:18)      PHYSICAL EXAM:   General: No acute distress  Eyes: EOMI, PERRLA, conjunctiva and sclera clear  Chest/Lung: CTAB, no wheezes, rales, or rhonchi  Heart: Paced rhythm . Normal S1/S2. No murmurs, rubs, or gallops.  Abdomen: Soft, nontender, nondistended. Normal bowel sounds.  Extremites: 2+ peripheral pulses B/L. No clubbing, cyanosis, or edema.  Neurology: A&O x3, no focal deficits  Skin: No rashes or lesions  ============================I/O===========================   I&O's Detail    08 Jan 2021 07:01  -  09 Jan 2021 07:00  --------------------------------------------------------  IN:    Oral Fluid: 120 mL    Sodium Chloride 0.9% Bolus: 250 mL    sodium chloride 3%: 100 mL  Total IN: 470 mL    OUT:    Indwelling Catheter - Urethral (mL): 940 mL  Total OUT: 940 mL    Total NET: -470 mL      09 Jan 2021 07:01  -  09 Jan 2021 22:07  --------------------------------------------------------  IN:    Oral Fluid: 600 mL    sodium chloride 3%: 55 mL  Total IN: 655 mL    OUT:    Indwelling Catheter - Urethral (mL): 1750 mL  Total OUT: 1750 mL    Total NET: -1095 mL        ============================ LABS =========================                        11.0   11.58 )-----------( 173      ( 09 Jan 2021 01:26 )             32.3     01-09    121<L>  |  88<L>  |  31<H>  ----------------------------<  170<H>  4.0   |  23  |  1.01    Ca    7.9<L>      09 Jan 2021 21:02  Phos  3.8     01-09  Mg     2.0     01-09    TPro  6.3  /  Alb  3.0<L>  /  TBili  0.7  /  DBili  x   /  AST  43<H>  /  ALT  64<H>  /  AlkPhos  72  01-09    Troponin T, High Sensitivity Result: 9 ng/L (01-08-21 @ 10:02)              LIVER FUNCTIONS - ( 09 Jan 2021 21:02 )  Alb: 3.0 g/dL / Pro: 6.3 g/dL / ALK PHOS: 72 U/L / ALT: 64 U/L / AST: 43 U/L / GGT: x           PT/INR - ( 08 Jan 2021 10:02 )   PT: 12.8 sec;   INR: 1.07 ratio         PTT - ( 08 Jan 2021 10:02 )  PTT:28.6 sec    Blood Gas Venous - Lactate: 3.8 mmoL/L (01-08-21 @ 13:20)  Lactate, Blood: 1.9 mmol/L (01-08-21 @ 11:44)  Blood Gas Venous - Lactate: 2.2 mmoL/L (01-08-21 @ 10:02)      ======================Micro/Rad/Cardio=================  Telemetry: Reviewed   EKG: Reviewed  CXR: Reviewed  Echo: Reviewed  ======================================================  PAST MEDICAL & SURGICAL HISTORY:  Asthma    GERD (gastroesophageal reflux disease)    HLD (hyperlipidemia)    HTN (hypertension)    Calculus of gallbladder without cholecystitis without obstruction    GERD (gastroesophageal reflux disease)    Asthma  Exacerbates in the winter    DM2 (diabetes mellitus, type 2)    HLD (hyperlipidemia)    HTN (hypertension)    No significant past surgical history      ====================ASSESSMENT ==============  3rd Degree Heart Block   B/L Pleural Effusions w/ mild pulmonary edema on CXR   Hyperkalemia  Hyponatremia  DMT2    Plan:  ====================== NEUROLOGY=====================  Restless and confused  - May be i/s/o hyponatremia, Na 118  - Continue to monitor neuro status closely as per protocol   -c/w 1:1 observation for safety       melatonin 5 milliGRAM(s) Oral at bedtime PRN Sleep    ==================== RESPIRATORY======================  B/L Pleural Effusions w/ mild pulmonary edema on CXR   - SpO2 94% on RA  - Continue to monitor SpO2 via pulse oximetry    ====================CARDIOVASCULAR==================  3rd Degree Heart Block   - s/p TVP VVI at rate 60bpm   - No Plan for PPM by EP at this time   - Monitor hemodynamics, monitor telemetry  - Trend cardiac enzymes, lactate levels, BMPs    HTN  - C/w Amlodipine 5mg PO QD   - PO Hydralazine 10mg QD     amLODIPine   Tablet 5 milliGRAM(s) Oral daily  hydrALAZINE 10 milliGRAM(s) Oral daily    ===================HEMATOLOGIC/ONC ===================  H/H 11.4/34.6, stable  - Continue to monitor hemoglobin and hematocrit levels.     VTE prophylaxis   - Continue SQ Heparin for venous thromboembolism prophylaxis.     heparin   Injectable 5000 Unit(s) SubCutaneous every 8 hours    ===================== RENAL =========================  Continue monitoring urine output    ==================== GASTROINTESTINAL===================  sodium chloride 3%. 100 milliLiter(s) (100 mL/Hr) IV Continuous <Continuous>    =======================    ENDOCRINE  =====================  dextrose 40% Gel 15 Gram(s) Oral once  dextrose 50% Injectable 25 Gram(s) IV Push once  dextrose 50% Injectable 12.5 Gram(s) IV Push once  dextrose 50% Injectable 25 Gram(s) IV Push once  glucagon  Injectable 1 milliGRAM(s) IntraMuscular once  insulin lispro (ADMELOG) corrective regimen sliding scale   SubCutaneous three times a day before meals  insulin lispro (ADMELOG) corrective regimen sliding scale   SubCutaneous at bedtime    ========================INFECTIOUS DISEASE================      Patient requires continuous monitoring with bedside rhythm monitoring, pulse ox monitoring, and intermittent blood gas analysis. Care plan discussed with ICU care team. Patient remained critical and at risk for life threatening decompensation.  Patient seen, examined and plan discussed with CCU team during rounds.     I have personally provided 35 minutes of critical care time excluding time spent on separate procedures.    By signing my name below, I, Cinthia Wild, attest that this documentation has been prepared under the direction and in the presence of _____  Electronically signed: Bartolo Castillo, 01-09-21 @ 22:07    I, ____ , personally performed the services described in this documentation. all medical record entries made by the bartolo were at my direction and in my presence. I have reviewed the chart and agree that the record reflects my personal performance and is accurate and complete  Electronically signed: ______       MATA CARRILLO  MRN-[id number]  Patient is a 65y old  Female who presents with a chief complaint of Complete heart block (08 Jan 2021 21:06)    HPI:  65y F w/ PMHx HTN, HLD, GERD, Asthma presenting as a transfer from [place] ED in setting of complete heart block. Patient was feeling weak and tired this morning and vomited x2 and was taken to the emergency department there was found to have complete heart block. Patient denies any chest pain, shortness of breath, fever or previous episodes. Patient was well until 3 days ago when she developed generalized weakness, malaise, and swelling of her hands, face and B/L LE. She has no known history of hypothyroidism. She was taking metoprolol until November for her hx of HTN, but daughter states her prescription ran out and was unable to refill. Daughter denies known sick contacts, recent travel. Ambulates without assistive device at baseline.    ED Course: Pt administered atropine 0.5mg  x 6 by EMS prior to OSH arrival. Pt arrived to [facility] w/ HR in 30s, Hypertensive and pacer pads placed on chest.  (08 Jan 2021 11:25)      Hospital Course:  1/8 Admitted to [facility] CICU for complete heart block. Severe hyponatremia, s/p 100ml 3% NS bolus,NA increased to 119.  K 5.6 Lokelma 10g dose x1 given.   1/9 TVP Placed    24 HOUR EVENTS:    REVIEW OF SYSTEMS:   Constitutional: Confused   Eyes/ENT: No visual changes  Respiratory: + SOB. No cough, wheezing, hemoptysis  Cardiovascular: No chest pain, no palpitations  Gastrointestinal: + N/V. No abdominal pain. No hematemesis.   Genitourinary: No dysuria  Neurological: No numbness, no weakness  Skin: No itching, rashes    ICU Vital Signs Last 24 Hrs  T(C): 37.1 (09 Jan 2021 17:30), Max: 37.1 (09 Jan 2021 03:31)  T(F): 98.8 (09 Jan 2021 17:30), Max: 98.8 (09 Jan 2021 03:31)  HR: 60 (09 Jan 2021 19:00) (32 - 114)  BP: 115/60 (09 Jan 2021 19:00) (115/60 - 173/56)  BP(mean): 68 (09 Jan 2021 19:00) (68 - 90)  ABP: --  ABP(mean): --  RR: 22 (09 Jan 2021 19:00) (16 - 25)  SpO2: 96% (09 Jan 2021 19:00) (94% - 99%)    I&O's Summary    08 Jan 2021 07:01  -  09 Jan 2021 07:00  --------------------------------------------------------  IN: 470 mL / OUT: 940 mL / NET: -470 mL    09 Jan 2021 07:01  -  09 Jan 2021 22:07  --------------------------------------------------------  IN: 655 mL / OUT: 1750 mL / NET: -1095 mL      POCT Blood Glucose.: 157 mg/dL (09 Jan 2021 18:18)      PHYSICAL EXAM:   General: No acute distress  Eyes: EOMI, PERRLA, conjunctiva and sclera clear  Chest/Lung: CTAB, no wheezes, rales, or rhonchi  Heart: Paced rhythm- TVP . Normal S1/S2. No murmurs, rubs, or gallops.  Abdomen: Soft, nontender, nondistended. Normal bowel sounds.  Extremites: 2+ peripheral pulses B/L. No clubbing, cyanosis, or edema.  Neurology: A&O x3, no focal deficits  Skin: No rashes or lesions  ============================I/O===========================   I&O's Detail    08 Jan 2021 07:01  -  09 Jan 2021 07:00  --------------------------------------------------------  IN:    Oral Fluid: 120 mL    Sodium Chloride 0.9% Bolus: 250 mL    sodium chloride 3%: 100 mL  Total IN: 470 mL    OUT:    Indwelling Catheter - Urethral (mL): 940 mL  Total OUT: 940 mL    Total NET: -470 mL      09 Jan 2021 07:01  -  09 Jan 2021 22:07  --------------------------------------------------------  IN:    Oral Fluid: 600 mL    sodium chloride 3%: 55 mL  Total IN: 655 mL    OUT:    Indwelling Catheter - Urethral (mL): 1750 mL  Total OUT: 1750 mL    Total NET: -1095 mL        ============================ LABS =========================                        11.0   11.58 )-----------( 173      ( 09 Jan 2021 01:26 )             32.3     01-09    121<L>  |  88<L>  |  31<H>  ----------------------------<  170<H>  4.0   |  23  |  1.01    Ca    7.9<L>      09 Jan 2021 21:02  Phos  3.8     01-09  Mg     2.0     01-09    TPro  6.3  /  Alb  3.0<L>  /  TBili  0.7  /  DBili  x   /  AST  43<H>  /  ALT  64<H>  /  AlkPhos  72  01-09    Troponin T, High Sensitivity Result: 9 ng/L (01-08-21 @ 10:02)              LIVER FUNCTIONS - ( 09 Jan 2021 21:02 )  Alb: 3.0 g/dL / Pro: 6.3 g/dL / ALK PHOS: 72 U/L / ALT: 64 U/L / AST: 43 U/L / GGT: x           PT/INR - ( 08 Jan 2021 10:02 )   PT: 12.8 sec;   INR: 1.07 ratio         PTT - ( 08 Jan 2021 10:02 )  PTT:28.6 sec    Blood Gas Venous - Lactate: 3.8 mmoL/L (01-08-21 @ 13:20)  Lactate, Blood: 1.9 mmol/L (01-08-21 @ 11:44)  Blood Gas Venous - Lactate: 2.2 mmoL/L (01-08-21 @ 10:02)      ======================Micro/Rad/Cardio=================  Telemetry: Reviewed   EKG: Reviewed  CXR: Reviewed  Echo: Reviewed  ======================================================  PAST MEDICAL & SURGICAL HISTORY:  Asthma    GERD (gastroesophageal reflux disease)    HLD (hyperlipidemia)    HTN (hypertension)    Calculus of gallbladder without cholecystitis without obstruction    GERD (gastroesophageal reflux disease)    Asthma  Exacerbates in the winter    DM2 (diabetes mellitus, type 2)    HLD (hyperlipidemia)    HTN (hypertension)    No significant past surgical history      ====================ASSESSMENT ==============  3rd Degree Heart Block   B/L Pleural Effusions w/ mild pulmonary edema on CXR   Hyperkalemia  Hyponatremia  DMT2    Plan:  ====================== NEUROLOGY=====================  Restless and confused  - May be i/s/o hyponatremia, Na 121  - Continue to monitor neuro status closely as per protocol   -c/w 1:1 observation for safety   - Melatonin 5mg prn for sleep      melatonin 5 milliGRAM(s) Oral at bedtime PRN Sleep    ==================== RESPIRATORY======================  B/L Pleural Effusions w/ mild pulmonary edema on CXR   - SpO2 96% on RA  - Continue to monitor SpO2 via pulse oximetry    Respiratory acidosis with mild metabolic acidosis, in setting of pulmonary edema  - Hold bicarb for now d/t pulmonary edema and fluid overload     ====================CARDIOVASCULAR==================  3rd Degree Heart Block   - s/p TVP VVI at rate 60bpm for symptomatic bradycardia and hypotension   - PPM by EP on 1/10   - Monitor hemodynamics, monitor telemetry  - Trend cardiac enzymes, lactate levels, BMPs    HTN  - C/w Amlodipine 5mg PO QD   - PO Hydralazine 10mg QD   - Monitor BP noninvasively     amLODIPine   Tablet 5 milliGRAM(s) Oral daily  hydrALAZINE 10 milliGRAM(s) Oral daily    ===================HEMATOLOGIC/ONC ===================  H/H 11/ 32.3   stable  - Continue to monitor hemoglobin and hematocrit levels.     VTE prophylaxis   - Continue SQ Heparin for venous thromboembolism prophylaxis.     heparin   Injectable 5000 Unit(s) SubCutaneous every 8 hours    ===================== RENAL =========================  Hyponatremia, Na 121.  - Restless and confused on arrival,  s/p 100cc 3%NS bolus  - trending CMP q4-6hrs  -goal Na  120-123, if Na >123 start D5w @75ml/hr and check CMP q2h   -Fluid restriction 1L/day   - Renal consulted    Hyperkalemia, K  4.0  - s/p Lokelma 10g x2   -s/p  lasix 40mg x1 IVP x1   - Trend CMP    ==================== GASTROINTESTINAL===================  NPO at midnight for PPM tomorrow     sodium chloride 3%. 100 milliLiter(s) (100 mL/Hr) IV Continuous <Continuous>    =======================    ENDOCRINE  =====================  Hx of DMT2   - Glycemic control with Admelog sliding scale and Glucagon PRN.   - Monitor blood glucose levels.   - A1c 6.8 %     dextrose 40% Gel 15 Gram(s) Oral once  dextrose 50% Injectable 25 Gram(s) IV Push once  dextrose 50% Injectable 12.5 Gram(s) IV Push once  dextrose 50% Injectable 25 Gram(s) IV Push once  glucagon  Injectable 1 milliGRAM(s) IntraMuscular once  insulin lispro (ADMELOG) corrective regimen sliding scale   SubCutaneous three times a day before meals  insulin lispro (ADMELOG) corrective regimen sliding scale   SubCutaneous at bedtime    ========================INFECTIOUS DISEASE================  Afebrile, WBC within normal limits.   - Monitor for leukocytosis / fever.  - ordered vanco for  perioperative antibiotic coverage     Patient requires continuous monitoring with bedside rhythm monitoring, pulse ox monitoring, and intermittent blood gas analysis. Care plan discussed with ICU care team. Patient remained critical and at risk for life threatening decompensation.  Patient seen, examined and plan discussed with CCU team during rounds.     I have personally provided 35 minutes of critical care time excluding time spent on separate procedures.    By signing my name below, I, Cinthia Wild, attest that this documentation has been prepared under the direction and in the presence of Payal Brady NP   Electronically signed: Regnia Castillo, 01-09-21 @ 22:07    I, Payal Brady NP , personally performed the services described in this documentation. all medical record entries made by the phyliciaibe were at my direction and in my presence. I have reviewed the chart and agree that the record reflects my personal performance and is accurate and complete  Electronically signed: Payal Brady NP        MATA CARRILLO  [id number]  Patient is a 65y old  Female who presents with a chief complaint of Complete heart block (08 Jan 2021 21:06)    HPI:  65y F w/ PMHx HTN, HLD, GERD, Asthma presenting as a transfer from [place] ED in setting of complete heart block. Patient was feeling weak and tired this morning and vomited x2 and was taken to the emergency department there was found to have complete heart block. Patient denies any chest pain, shortness of breath, fever or previous episodes. Patient was well until 3 days ago when she developed generalized weakness, malaise, and swelling of her hands, face and B/L LE. She has no known history of hypothyroidism. She was taking metoprolol until November for her hx of HTN, but daughter states her prescription ran out and was unable to refill. Daughter denies known sick contacts, recent travel. Ambulates without assistive device at baseline.    ED Course: Pt administered atropine 0.5mg  x 6 by EMS prior to OSH arrival. Pt arrived to [facility] w/ HR in 30s, Hypertensive and pacer pads placed on chest.  (08 Jan 2021 11:25)      Hospital Course:  1/8 Admitted to [facility] CICU for complete heart block. Severe hyponatremia, s/p 100ml 3% NS bolus,NA increased to 119.  K 5.6 Lokelma 10g dose x1 given.   1/9 TVP Placed    24 HOUR EVENTS:  NPO post midnight for PPM tomorrow with Dr. Lieberman     REVIEW OF SYSTEMS:   Constitutional: feeling better   Eyes/ENT: No visual changes  Respiratory: no SOB. No cough, wheezing, hemoptysis  Cardiovascular: No chest pain, no palpitations  Gastrointestinal: + N/V. No abdominal pain. No hematemesis.   Genitourinary: No dysuria  Neurological: No numbness, no weakness  Skin: No itching, rashes    ICU Vital Signs Last 24 Hrs  T(C): 37.1 (09 Jan 2021 17:30), Max: 37.1 (09 Jan 2021 03:31)  T(F): 98.8 (09 Jan 2021 17:30), Max: 98.8 (09 Jan 2021 03:31)  HR: 60 (09 Jan 2021 19:00) (32 - 114)  BP: 115/60 (09 Jan 2021 19:00) (115/60 - 173/56)  BP(mean): 68 (09 Jan 2021 19:00) (68 - 90)  ABP: --  ABP(mean): --  RR: 22 (09 Jan 2021 19:00) (16 - 25)  SpO2: 96% (09 Jan 2021 19:00) (94% - 99%)    I&O's Summary    08 Jan 2021 07:01  -  09 Jan 2021 07:00  --------------------------------------------------------  IN: 470 mL / OUT: 940 mL / NET: -470 mL    09 Jan 2021 07:01  -  09 Jan 2021 22:07  --------------------------------------------------------  IN: 655 mL / OUT: 1750 mL / NET: -1095 mL      POCT Blood Glucose.: 157 mg/dL (09 Jan 2021 18:18)      PHYSICAL EXAM:   General: No acute distress  Eyes: EOMI, PERRLA, conjunctiva and sclera clear  Chest/Lung: CTAB, no wheezes, rales, or rhonchi  Heart: Paced rhythm- TVP . Normal S1/S2. No murmurs, rubs, or gallops.  Abdomen: Soft, nontender, nondistended. Normal bowel sounds.  Extremites: 2+ peripheral pulses B/L. No clubbing, cyanosis, or edema.  Neurology: A&O x3, no focal deficits  Skin: No rashes or lesions  ============================I/O===========================   I&O's Detail    08 Jan 2021 07:01  -  09 Jan 2021 07:00  --------------------------------------------------------  IN:    Oral Fluid: 120 mL    Sodium Chloride 0.9% Bolus: 250 mL    sodium chloride 3%: 100 mL  Total IN: 470 mL    OUT:    Indwelling Catheter - Urethral (mL): 940 mL  Total OUT: 940 mL    Total NET: -470 mL      09 Jan 2021 07:01  -  09 Jan 2021 22:07  --------------------------------------------------------  IN:    Oral Fluid: 600 mL    sodium chloride 3%: 55 mL  Total IN: 655 mL    OUT:    Indwelling Catheter - Urethral (mL): 1750 mL  Total OUT: 1750 mL    Total NET: -1095 mL        ============================ LABS =========================                        11.0   11.58 )-----------( 173      ( 09 Jan 2021 01:26 )             32.3     01-09    121<L>  |  88<L>  |  31<H>  ----------------------------<  170<H>  4.0   |  23  |  1.01    Ca    7.9<L>      09 Jan 2021 21:02  Phos  3.8     01-09  Mg     2.0     01-09    TPro  6.3  /  Alb  3.0<L>  /  TBili  0.7  /  DBili  x   /  AST  43<H>  /  ALT  64<H>  /  AlkPhos  72  01-09    Troponin T, High Sensitivity Result: 9 ng/L (01-08-21 @ 10:02)              LIVER FUNCTIONS - ( 09 Jan 2021 21:02 )  Alb: 3.0 g/dL / Pro: 6.3 g/dL / ALK PHOS: 72 U/L / ALT: 64 U/L / AST: 43 U/L / GGT: x           PT/INR - ( 08 Jan 2021 10:02 )   PT: 12.8 sec;   INR: 1.07 ratio         PTT - ( 08 Jan 2021 10:02 )  PTT:28.6 sec    Blood Gas Venous - Lactate: 3.8 mmoL/L (01-08-21 @ 13:20)  Lactate, Blood: 1.9 mmol/L (01-08-21 @ 11:44)  Blood Gas Venous - Lactate: 2.2 mmoL/L (01-08-21 @ 10:02)      ======================Micro/Rad/Cardio=================  Telemetry: Reviewed   EKG: Reviewed  CXR: Reviewed  Echo: Reviewed  ======================================================  PAST MEDICAL & SURGICAL HISTORY:  Asthma    GERD (gastroesophageal reflux disease)    HLD (hyperlipidemia)    HTN (hypertension)    Calculus of gallbladder without cholecystitis without obstruction    GERD (gastroesophageal reflux disease)    Asthma  Exacerbates in the winter    DM2 (diabetes mellitus, type 2)    HLD (hyperlipidemia)    HTN (hypertension)    No significant past surgical history      ====================ASSESSMENT ==============  3rd Degree Heart Block   B/L Pleural Effusions w/ mild pulmonary edema on CXR   Hyperkalemia  Hyponatremia  DMT2    Plan:  ====================== NEUROLOGY=====================  AM improved post TVP placement   - May be i/s/o hyponatremia, Na 121  - Continue to monitor neuro status closely as per protocol   -c/w 1:1 observation for safety   - Melatonin 5mg prn for sleep      melatonin 5 milliGRAM(s) Oral at bedtime PRN Sleep    ==================== RESPIRATORY======================  B/L Pleural Effusions w/ mild pulmonary edema on CXR   - SpO2 96% on RA  - Continue to monitor SpO2 via pulse oximetry    Respiratory acidosis with mild metabolic acidosis, in setting of pulmonary edema  - Hold bicarb for now d/t pulmonary edema and fluid overload     ====================CARDIOVASCULAR==================  3rd Degree Heart Block   - s/p TVP VVI at rate 60bpm for symptomatic bradycardia and hypotension   - PPM by EP tomorrow, NPO post midnight. Vanco 1g in the AM prior to OR   - Monitor hemodynamics, monitor telemetry  - Trend cardiac enzymes, lactate levels, BMPs    HTN  - C/w Amlodipine 5mg PO QD   - PO Hydralazine 10mg QD   - Monitor BP noninvasively     amLODIPine   Tablet 5 milliGRAM(s) Oral daily  hydrALAZINE 10 milliGRAM(s) Oral daily    ===================HEMATOLOGIC/ONC ===================  H/H 11/ 32.3   stable  - Continue to monitor hemoglobin and hematocrit levels.     VTE prophylaxis   - Continue SQ Heparin for venous thromboembolism prophylaxis.     heparin   Injectable 5000 Unit(s) SubCutaneous every 8 hours    ===================== RENAL =========================  Hyponatremia, Na 121.  - Restless and confused on arrival,  s/p 100cc 3%NS bolus  - trending CMP q4-6hrs  -s/p Lasix 40mg IVP, Na stable at 121   -goal Na  120-123, if Na >123 start D5w @75ml/hr and check CMP q2h   -Fluid restriction 1L/day   - Renal consulted    Hyperkalemia, K  4.0  - s/p Lokelma 10g x2   -s/p  lasix 40mg x1 IVP x1   - Trend CMP    ==================== GASTROINTESTINAL===================  NPO at midnight for PPM tomorrow     sodium chloride 3%. 100 milliLiter(s) (100 mL/Hr) IV Continuous <Continuous>    =======================    ENDOCRINE  =====================  Hx of DMT2   - Glycemic control with Admelog sliding scale and Glucagon PRN.   - Monitor blood glucose levels.   - A1c 6.8 %     dextrose 40% Gel 15 Gram(s) Oral once  dextrose 50% Injectable 25 Gram(s) IV Push once  dextrose 50% Injectable 12.5 Gram(s) IV Push once  dextrose 50% Injectable 25 Gram(s) IV Push once  glucagon  Injectable 1 milliGRAM(s) IntraMuscular once  insulin lispro (ADMELOG) corrective regimen sliding scale   SubCutaneous three times a day before meals  insulin lispro (ADMELOG) corrective regimen sliding scale   SubCutaneous at bedtime    ========================INFECTIOUS DISEASE================  Afebrile, WBC within normal limits.   - Monitor for leukocytosis / fever.  - ordered vanco for  perioperative antibiotic coverage     Patient requires continuous monitoring with bedside rhythm monitoring, pulse ox monitoring, and intermittent blood gas analysis. Care plan discussed with ICU care team. Patient remained critical and at risk for life threatening decompensation.  Patient seen, examined and plan discussed with CCU team during rounds.     I have personally provided 35 minutes of critical care time excluding time spent on separate procedures.    By signing my name below, I, Cinthia Wild, attest that this documentation has been prepared under the direction and in the presence of Payal Brady NP   Electronically signed: Regina Castillo, 01-09-21 @ 22:07    I, Payal Brady NP , personally performed the services described in this documentation. all medical record entries made by the phyliciaibsamira were at my direction and in my presence. I have reviewed the chart and agree that the record reflects my personal performance and is accurate and complete  Electronically signed: Payal Brady NP

## 2021-01-09 NOTE — PROGRESS NOTE ADULT - SUBJECTIVE AND OBJECTIVE BOX
[facility] DIVISION OF KIDNEY DISEASES AND HYPERTENSION -- FOLLOW UP NOTE  --------------------------------------------------------------------------------  Chief Complaint:/subjective: no acute events; ex pacing; s/p 3% yesterday and then lasix at 4am with improving sodium    24 hour events:as above        PAST HISTORY  --------------------------------------------------------------------------------  No significant changes to PMH, PSH, FHx, SHx, unless otherwise noted    ALLERGIES & MEDICATIONS  --------------------------------------------------------------------------------  Allergies    No Known Allergies    Intolerances      Standing Inpatient Medications  amLODIPine   Tablet 5 milliGRAM(s) Oral daily  chlorhexidine 4% Liquid 1 Application(s) Topical <User Schedule>  dextrose 40% Gel 15 Gram(s) Oral once  dextrose 50% Injectable 25 Gram(s) IV Push once  dextrose 50% Injectable 12.5 Gram(s) IV Push once  dextrose 50% Injectable 25 Gram(s) IV Push once  glucagon  Injectable 1 milliGRAM(s) IntraMuscular once  heparin   Injectable 5000 Unit(s) SubCutaneous every 8 hours  insulin lispro (ADMELOG) corrective regimen sliding scale   SubCutaneous three times a day before meals  insulin lispro (ADMELOG) corrective regimen sliding scale   SubCutaneous at bedtime  sodium chloride 3%. 100 milliLiter(s) IV Continuous <Continuous>  sodium zirconium cyclosilicate 10 Gram(s) Oral three times a day    PRN Inpatient Medications      REVIEW OF SYSTEMS  --------------------------------------------------------------------------------  Gen: No weight changes, fatigue, fevers/chills, weakness  Skin: No rashes  Head/Eyes/Ears/Mouth: No headache;   Respiratory: No dyspnea, cough  CV: No chest pain, PND, orthopnea  GI: No abdominal pain, diarrhea, constipation, nausea, vomiting  : No increased frequency, dysuria, hematuria, nocturia  MSK: No joint pain/swelling; no back pain; no edema  Neuro: No dizziness/lightheadedness, weakness  Heme: No easy bruising or bleeding  Psych: No significant nervousness, anxiety, stress, depression    All other systems were reviewed and are negative, except as noted.    VITALS/PHYSICAL EXAM  --------------------------------------------------------------------------------  T(C): 37.1 (01-09-21 @ 07:30), Max: 37.1 (01-09-21 @ 03:31)  HR: 90 (01-09-21 @ 07:30) (32 - 117)  BP: 142/65 (01-09-21 @ 07:30) (126/93 - 173/56)  RR: 18 (01-09-21 @ 07:30) (16 - 28)  SpO2: 96% (01-09-21 @ 07:30) (94% - 99%)  Wt(kg): --  Adult Advanced Hemodynamics Last 24 Hrs  ABP: --  ABP(mean): --  CVP(mm Hg): --  CO: --  CI: --  PA: --  PA(mean): --  PCWP: --  SVR: --  SVRI: --  Height (cm): 152.4 (01-08-21 @ 10:30)  Weight (kg): 65.7 (01-08-21 @ 10:30)  BMI (kg/m2): 28.3 (01-08-21 @ 10:30)  BSA (m2): 1.63 (01-08-21 @ 10:30)      01-08-21 @ 07:01  -  01-09-21 @ 07:00  --------------------------------------------------------  IN: 470 mL / OUT: 940 mL / NET: -470 mL    01-09-21 @ 07:01  -  01-09-21 @ 10:29  --------------------------------------------------------  IN: 195 mL / OUT: 400 mL / NET: -205 mL      Physical Exam:  	Gen: NAD,    	HEENT:   no jvp  	Pulm: bibasilar crackles  	CV: RRR, S1S2; no rub  	Back:   no sacral edema  	Abd: +BS, soft,    	: No suprapubic tenderness  	Ext: 1+edema  	Neuro: awake  	Psych: alert  	Skin: Warm   	Vascular access:    LABS/STUDIES  --------------------------------------------------------------------------------              11.0   11.58 >-----------<  173      [01-09-21 @ 01:26]              32.3     Hemoglobin: 11.0 g/dL (01-09-21 @ 01:26)  Hemoglobin: 11.4 g/dL (01-08-21 @ 11:44)    Platelet Count - Automated: 173 K/uL (01-09-21 @ 01:26)  Platelet Count - Automated: 173 K/uL (01-08-21 @ 11:44)    121  |  86  |  32  ----------------------------<  159      [01-09-21 @ 06:36]  4.8   |  20  |  1.10        Ca     7.9     [01-09-21 @ 06:36]      Mg     2.0     [01-09-21 @ 06:36]      Phos  3.8     [01-09-21 @ 06:36]    TPro  6.9  /  Alb  3.5  /  TBili  0.8  /  DBili  x   /  AST  44  /  ALT  66  /  AlkPhos  72  [01-09-21 @ 06:36]    PT/INR: PT 12.8 , INR 1.07       [01-08-21 @ 10:02]  PTT: 28.6       [01-08-21 @ 10:02]    Serum Osmolality 262      [01-08-21 @ 12:21]    Creatinine Trend:  SCr 1.10 [01-09 @ 06:36]  SCr 1.22 [01-09 @ 01:26]  SCr 0.99 [01-08 @ 18:23]  SCr 0.94 [01-08 @ 11:44]  SCr 0.88 [01-08 @ 10:02]      Urine Creatinine 119      [01-08-21 @ 12:48]  Urine Sodium <35      [01-08-21 @ 12:48]  Urine Osmolality 395      [01-08-21 @ 12:48]    HbA1c 6.5      [12-03-18 @ 22:38]  TSH 1.57      [01-08-21 @ 13:11]  Lipid: chol 80, TG 89, HDL 36, LDL --      [01-08-21 @ 18:24]

## 2021-01-10 LAB
ALBUMIN SERPL ELPH-MCNC: 2.9 G/DL — LOW (ref 3.3–5)
ALP SERPL-CCNC: 70 U/L — SIGNIFICANT CHANGE UP (ref 40–120)
ALT FLD-CCNC: 61 U/L — HIGH (ref 10–45)
ANION GAP SERPL CALC-SCNC: 10 MMOL/L — SIGNIFICANT CHANGE UP (ref 5–17)
ANION GAP SERPL CALC-SCNC: 9 MMOL/L — SIGNIFICANT CHANGE UP (ref 5–17)
APTT BLD: 37.3 SEC — HIGH (ref 27.5–35.5)
AST SERPL-CCNC: 43 U/L — HIGH (ref 10–40)
BILIRUB SERPL-MCNC: 0.8 MG/DL — SIGNIFICANT CHANGE UP (ref 0.2–1.2)
BUN SERPL-MCNC: 27 MG/DL — HIGH (ref 7–23)
BUN SERPL-MCNC: 28 MG/DL — HIGH (ref 7–23)
CALCIUM SERPL-MCNC: 7.7 MG/DL — LOW (ref 8.4–10.5)
CALCIUM SERPL-MCNC: 7.9 MG/DL — LOW (ref 8.4–10.5)
CHLORIDE SERPL-SCNC: 91 MMOL/L — LOW (ref 96–108)
CHLORIDE SERPL-SCNC: 91 MMOL/L — LOW (ref 96–108)
CO2 SERPL-SCNC: 22 MMOL/L — SIGNIFICANT CHANGE UP (ref 22–31)
CO2 SERPL-SCNC: 23 MMOL/L — SIGNIFICANT CHANGE UP (ref 22–31)
CREAT SERPL-MCNC: 0.85 MG/DL — SIGNIFICANT CHANGE UP (ref 0.5–1.3)
CREAT SERPL-MCNC: 0.91 MG/DL — SIGNIFICANT CHANGE UP (ref 0.5–1.3)
GLUCOSE BLDC GLUCOMTR-MCNC: 163 MG/DL — HIGH (ref 70–99)
GLUCOSE BLDC GLUCOMTR-MCNC: 194 MG/DL — HIGH (ref 70–99)
GLUCOSE SERPL-MCNC: 172 MG/DL — HIGH (ref 70–99)
GLUCOSE SERPL-MCNC: 187 MG/DL — HIGH (ref 70–99)
HCT VFR BLD CALC: 34.7 % — SIGNIFICANT CHANGE UP (ref 34.5–45)
HGB BLD-MCNC: 11.8 G/DL — SIGNIFICANT CHANGE UP (ref 11.5–15.5)
INR BLD: 1.17 RATIO — HIGH (ref 0.88–1.16)
MCHC RBC-ENTMCNC: 27.4 PG — SIGNIFICANT CHANGE UP (ref 27–34)
MCHC RBC-ENTMCNC: 34 GM/DL — SIGNIFICANT CHANGE UP (ref 32–36)
MCV RBC AUTO: 80.7 FL — SIGNIFICANT CHANGE UP (ref 80–100)
NRBC # BLD: 0 /100 WBCS — SIGNIFICANT CHANGE UP (ref 0–0)
PHOSPHATE SERPL-MCNC: 3.3 MG/DL — SIGNIFICANT CHANGE UP (ref 2.5–4.5)
PLATELET # BLD AUTO: 170 K/UL — SIGNIFICANT CHANGE UP (ref 150–400)
POTASSIUM SERPL-MCNC: 4.1 MMOL/L — SIGNIFICANT CHANGE UP (ref 3.5–5.3)
POTASSIUM SERPL-MCNC: 4.4 MMOL/L — SIGNIFICANT CHANGE UP (ref 3.5–5.3)
POTASSIUM SERPL-SCNC: 4.1 MMOL/L — SIGNIFICANT CHANGE UP (ref 3.5–5.3)
POTASSIUM SERPL-SCNC: 4.4 MMOL/L — SIGNIFICANT CHANGE UP (ref 3.5–5.3)
PROT SERPL-MCNC: 6.4 G/DL — SIGNIFICANT CHANGE UP (ref 6–8.3)
PROTHROM AB SERPL-ACNC: 13.9 SEC — HIGH (ref 10.6–13.6)
RBC # BLD: 4.3 M/UL — SIGNIFICANT CHANGE UP (ref 3.8–5.2)
RBC # FLD: 12.7 % — SIGNIFICANT CHANGE UP (ref 10.3–14.5)
SODIUM SERPL-SCNC: 123 MMOL/L — LOW (ref 135–145)
SODIUM SERPL-SCNC: 123 MMOL/L — LOW (ref 135–145)
WBC # BLD: 13.13 K/UL — HIGH (ref 3.8–10.5)
WBC # FLD AUTO: 13.13 K/UL — HIGH (ref 3.8–10.5)

## 2021-01-10 PROCEDURE — 99291 CRITICAL CARE FIRST HOUR: CPT

## 2021-01-10 PROCEDURE — 71045 X-RAY EXAM CHEST 1 VIEW: CPT | Mod: 26

## 2021-01-10 PROCEDURE — 99233 SBSQ HOSP IP/OBS HIGH 50: CPT | Mod: GC

## 2021-01-10 PROCEDURE — 93010 ELECTROCARDIOGRAM REPORT: CPT

## 2021-01-10 PROCEDURE — 99232 SBSQ HOSP IP/OBS MODERATE 35: CPT

## 2021-01-10 PROCEDURE — 33208 INSRT HEART PM ATRIAL & VENT: CPT

## 2021-01-10 RX ORDER — CEFAZOLIN SODIUM 1 G
1000 VIAL (EA) INJECTION ONCE
Refills: 0 | Status: DISCONTINUED | OUTPATIENT
Start: 2021-01-10 | End: 2021-01-10

## 2021-01-10 RX ORDER — FUROSEMIDE 40 MG
40 TABLET ORAL ONCE
Refills: 0 | Status: COMPLETED | OUTPATIENT
Start: 2021-01-10 | End: 2021-01-10

## 2021-01-10 RX ORDER — HYDRALAZINE HCL 50 MG
10 TABLET ORAL
Refills: 0 | Status: DISCONTINUED | OUTPATIENT
Start: 2021-01-10 | End: 2021-01-10

## 2021-01-10 RX ORDER — CEFAZOLIN SODIUM 1 G
1000 VIAL (EA) INJECTION ONCE
Refills: 0 | Status: COMPLETED | OUTPATIENT
Start: 2021-01-10 | End: 2021-01-10

## 2021-01-10 RX ORDER — HYDRALAZINE HCL 50 MG
25 TABLET ORAL
Refills: 0 | Status: DISCONTINUED | OUTPATIENT
Start: 2021-01-10 | End: 2021-01-11

## 2021-01-10 RX ORDER — ACETAMINOPHEN 500 MG
1000 TABLET ORAL ONCE
Refills: 0 | Status: COMPLETED | OUTPATIENT
Start: 2021-01-10 | End: 2021-01-10

## 2021-01-10 RX ADMIN — Medication 10 MILLIGRAM(S): at 05:11

## 2021-01-10 RX ADMIN — HEPARIN SODIUM 5000 UNIT(S): 5000 INJECTION INTRAVENOUS; SUBCUTANEOUS at 20:51

## 2021-01-10 RX ADMIN — Medication 100 MILLIGRAM(S): at 20:51

## 2021-01-10 RX ADMIN — Medication 25 MILLIGRAM(S): at 18:10

## 2021-01-10 RX ADMIN — HEPARIN SODIUM 5000 UNIT(S): 5000 INJECTION INTRAVENOUS; SUBCUTANEOUS at 18:10

## 2021-01-10 RX ADMIN — Medication 250 MILLIGRAM(S): at 08:04

## 2021-01-10 RX ADMIN — Medication 1: at 17:23

## 2021-01-10 RX ADMIN — AMLODIPINE BESYLATE 5 MILLIGRAM(S): 2.5 TABLET ORAL at 05:11

## 2021-01-10 RX ADMIN — Medication 5 MILLIGRAM(S): at 20:51

## 2021-01-10 RX ADMIN — CHLORHEXIDINE GLUCONATE 1 APPLICATION(S): 213 SOLUTION TOPICAL at 05:14

## 2021-01-10 RX ADMIN — Medication 400 MILLIGRAM(S): at 14:50

## 2021-01-10 RX ADMIN — Medication 40 MILLIGRAM(S): at 16:12

## 2021-01-10 NOTE — PROGRESS NOTE ADULT - SUBJECTIVE AND OBJECTIVE BOX
MATA CARRILLO  MRN-[id number]  Patient is a 65y old  Female who presents with a chief complaint of CHB (09 Jan 2021 22:06)    HPI:  65y F w/ PMHx HTN, HLD, GERD, Asthma presenting as a transfer from [place] ED in setting of complete heart block. Patient was feeling weak and tired this morning and vomited x2 and was taken to the emergency department there was found to have complete heart block. Patient denies any chest pain, shortness of breath, fever or previous episodes. Patient was well until 3 days ago when she developed generalized weakness, malaise, and swelling of her hands, face and B/L LE. She has no known history of hypothyroidism. She was taking metoprolol until November for her hx of HTN, but daughter states her prescription ran out and was unable to refill. Daughter denies known sick contacts, recent travel. Ambulates without assistive device at baseline.    ED Course: Pt administered atropine 0.5mg  x 6 by EMS prior to OSH arrival. Pt arrived to [facility] w/ HR in 30s, Hypertensive and pacer pads placed on chest.  (08 Jan 2021 11:25)      Hospital Course:    24 HOUR EVENTS:    REVIEW OF SYSTEMS:   Constitutional: No weakness, fevers, or chills  Eyes/ENT: No visual changes  Respiratory: No cough, wheezing, hemoptysis  Cardiovascular: No chest pain, no palpitations  Gastrointestinal: No abdominal pain. No nausea, vomiting, hematemesis.   Genitourinary: No dysuria  Neurological: No numbness, no weakness  Skin: No itching, rashes    ICU Vital Signs Last 24 Hrs  T(C): 37.1 (10 Wilson 2021 22:55), Max: 37.1 (10 Wilson 2021 22:55)  T(F): 98.7 (10 Wilson 2021 22:55), Max: 98.7 (10 Wilson 2021 22:55)  HR: 90 (10 Wilson 2021 22:55) (60 - 99)  BP: 136/79 (10 Wilson 2021 22:55) (90/95 - 163/63)  BP(mean): 94 (10 Wilson 2021 22:55) (68 - 104)  ABP: --  ABP(mean): --  RR: 12 (10 Wilson 2021 22:55) (12 - 27)  SpO2: 98% (10 Wilson 2021 22:55) (94% - 99%)      CVP(mm Hg): --  CO: --  CI: --  PA: --  PA(mean): --  PA(direct): --  PCWP: --  LA: --  RA: --  SVR: --  SVRI: --  PVR: --  PVRI: --  I&O's Summary    09 Jan 2021 07:01  -  10 Wilson 2021 07:00  --------------------------------------------------------  IN: 755 mL / OUT: 2525 mL / NET: -1770 mL    10 Wilson 2021 07:01  -  10 Wilson 2021 23:51  --------------------------------------------------------  IN: 730 mL / OUT: 2095 mL / NET: -1365 mL        CAPILLARY BLOOD GLUCOSE    CAPILLARY BLOOD GLUCOSE      POCT Blood Glucose.: 163 mg/dL (10 Wilson 2021 20:49)      PHYSICAL EXAM:   General: No acute distress  Eyes: EOMI, PERRLA, conjunctiva and sclera clear  Chest/Lung: CTAB, no wheezes, rales, or rhonchi  Heart: Regular rate, regular rhythm. Normal S1/S2. No murmurs, rubs, or gallops.  Abdomen: Soft, nontender, nondistended. Normal bowel sounds.  Extremites: 2+ peripheral pulses B/L. No clubbing, cyanosis, or edema.  Neurology: A&O x3, no focal deficits  Skin: No rashes or lesions  ============================I/O===========================   I&O's Detail    09 Jan 2021 07:01  -  10 Wilson 2021 07:00  --------------------------------------------------------  IN:    Oral Fluid: 700 mL    sodium chloride 3%: 55 mL  Total IN: 755 mL    OUT:    Indwelling Catheter - Urethral (mL): 2525 mL  Total OUT: 2525 mL    Total NET: -1770 mL      10 Wilson 2021 07:01  -  10 Wilson 2021 23:51  --------------------------------------------------------  IN:    IV PiggyBack: 400 mL    Oral Fluid: 330 mL  Total IN: 730 mL    OUT:    Indwelling Catheter - Urethral (mL): 2095 mL  Total OUT: 2095 mL    Total NET: -1365 mL        ============================ LABS =========================                        11.8   13.13 )-----------( 170      ( 10 Wilson 2021 05:10 )             34.7     01-10    123<L>  |  91<L>  |  27<H>  ----------------------------<  187<H>  4.1   |  23  |  0.85    Ca    7.7<L>      10 Wilson 2021 15:18  Phos  3.3     01-10  Mg     2.0     01-09    TPro  6.4  /  Alb  2.9<L>  /  TBili  0.8  /  DBili  x   /  AST  43<H>  /  ALT  61<H>  /  AlkPhos  70  01-10    Troponin T, High Sensitivity Result: 9 ng/L (01-08-21 @ 10:02)              LIVER FUNCTIONS - ( 10 Wilson 2021 05:10 )  Alb: 2.9 g/dL / Pro: 6.4 g/dL / ALK PHOS: 70 U/L / ALT: 61 U/L / AST: 43 U/L / GGT: x           PT/INR - ( 10 Wilson 2021 05:10 )   PT: 13.9 sec;   INR: 1.17 ratio         PTT - ( 10 Wilson 2021 05:10 )  PTT:37.3 sec    Blood Gas Venous - Lactate: 3.8 mmoL/L (01-08-21 @ 13:20)  Lactate, Blood: 1.9 mmol/L (01-08-21 @ 11:44)  Blood Gas Venous - Lactate: 2.2 mmoL/L (01-08-21 @ 10:02)      ======================Micro/Rad/Cardio=================  Telemetry: Reviewed   EKG: Reviewed  CXR: Reviewed  Culture: Reviewed   Echo: Reviewed  Cath: Reviewed  ======================================================  PAST MEDICAL & SURGICAL HISTORY:  Asthma    GERD (gastroesophageal reflux disease)    HLD (hyperlipidemia)    HTN (hypertension)    Calculus of gallbladder without cholecystitis without obstruction    GERD (gastroesophageal reflux disease)    Asthma  Exacerbates in the winter    DM2 (diabetes mellitus, type 2)    HLD (hyperlipidemia)    HTN (hypertension)    No significant past surgical history      ====================ASSESSMENT ==============    Plan:  ====================== NEUROLOGY=====================  melatonin 5 milliGRAM(s) Oral at bedtime PRN Sleep    ==================== RESPIRATORY======================  Mechanical Ventilation:      ====================CARDIOVASCULAR==================  amLODIPine   Tablet 5 milliGRAM(s) Oral daily  hydrALAZINE 25 milliGRAM(s) Oral two times a day    ===================HEMATOLOGIC/ONC ===================  heparin   Injectable 5000 Unit(s) SubCutaneous every 8 hours    ===================== RENAL =========================  Continue monitoring urine output    ==================== GASTROINTESTINAL===================    =======================    ENDOCRINE  =====================  dextrose 40% Gel 15 Gram(s) Oral once  dextrose 50% Injectable 25 Gram(s) IV Push once  dextrose 50% Injectable 12.5 Gram(s) IV Push once  dextrose 50% Injectable 25 Gram(s) IV Push once  glucagon  Injectable 1 milliGRAM(s) IntraMuscular once  insulin lispro (ADMELOG) corrective regimen sliding scale   SubCutaneous three times a day before meals  insulin lispro (ADMELOG) corrective regimen sliding scale   SubCutaneous at bedtime    ========================INFECTIOUS DISEASE================      Patient requires continuous monitoring with bedside rhythm monitoring, pulse ox monitoring, and intermittent blood gas analysis. Care plan discussed with ICU care team. Patient remained critical and at risk for life threatening decompensation.  Patient seen, examined and plan discussed with CCU team during rounds.     I have personally provided 35 minutes of critical care time excluding time spent on separate procedures.    By signing my name below, Cinthia MARTINEZ, attest that this documentation has been prepared under the direction and in the presence of NANCY Quintero   Electronically signed: Regina Castillo, 01-10-21 @ 23:51    JUAN, NANCY Quintero , personally performed the services described in this documentation. all medical record entries made by the scribe were at my direction and in my presence. I have reviewed the chart and agree that the record reflects my personal performance and is accurate and complete  Electronically signed: NANCY Quintero      MATA CARRILLO  MRN-[id number]  Patient is a 65y old  Female who presents with a chief complaint of CHB (09 Jan 2021 22:06)    HPI:  65y F w/ PMHx HTN, HLD, GERD, Asthma presenting as a transfer from [place] ED in setting of complete heart block. Patient was feeling weak and tired this morning and vomited x2 and was taken to the emergency department there was found to have complete heart block. Patient denies any chest pain, shortness of breath, fever or previous episodes. Patient was well until 3 days ago when she developed generalized weakness, malaise, and swelling of her hands, face and B/L LE. She has no known history of hypothyroidism. She was taking metoprolol until November for her hx of HTN, but daughter states her prescription ran out and was unable to refill. Daughter denies known sick contacts, recent travel. Ambulates without assistive device at baseline.    ED Course: Pt administered atropine 0.5mg  x 6 by EMS prior to OSH arrival. Pt arrived to [facility] w/ HR in 30s, Hypertensive and pacer pads placed on chest.  (08 Jan 2021 11:25)      Hospital Course:  1/8 Admitted to [facility] CICU for complete heart block. Severe hyponatremia, s/p 100ml 3% NS bolus,NA increased to 119.  K 5.6 Lokelma 10g dose x1 given.   1/9 TVP Placed  1/10 PPM placed with Dr. Lieberman    24 HOUR EVENTS:  PPM placed with Dr. Lieberman    REVIEW OF SYSTEMS:   Constitutional: feeling better   Eyes/ENT: No visual changes  Respiratory: no SOB. No cough, wheezing, hemoptysis  Cardiovascular: No chest pain, no palpitations  Gastrointestinal: + N/V. No abdominal pain. No hematemesis.   Genitourinary: No dysuria  Neurological: No numbness, no weakness  Skin: No itching, rashes    ICU Vital Signs Last 24 Hrs  T(C): 37.1 (10 Wilson 2021 22:55), Max: 37.1 (10 Wilson 2021 22:55)  T(F): 98.7 (10 Wilson 2021 22:55), Max: 98.7 (10 Wilson 2021 22:55)  HR: 90 (10 Wilson 2021 22:55) (60 - 99)  BP: 136/79 (10 Wilson 2021 22:55) (90/95 - 163/63)  BP(mean): 94 (10 Wilson 2021 22:55) (68 - 104)  ABP: --  ABP(mean): --  RR: 12 (10 Wilson 2021 22:55) (12 - 27)  SpO2: 98% (10 Wilson 2021 22:55) (94% - 99%)      I&O's Summary    09 Jan 2021 07:01  -  10 Wilson 2021 07:00  --------------------------------------------------------  IN: 755 mL / OUT: 2525 mL / NET: -1770 mL    10 Wilson 2021 07:01  -  10 Wilson 2021 23:51  --------------------------------------------------------  IN: 730 mL / OUT: 2095 mL / NET: -1365 mL      POCT Blood Glucose.: 163 mg/dL (10 Wilson 2021 20:49)      PHYSICAL EXAM:   General: No acute distress  Eyes: EOMI, PERRLA, conjunctiva and sclera clear  Chest/Lung: CTAB, no wheezes, rales, or rhonchi  Heart: Paced rhythm- PPM . Normal S1/S2. No murmurs, rubs, or gallops.  Abdomen: Soft, nontender, nondistended. Normal bowel sounds.  Extremites: 2+ peripheral pulses B/L. No clubbing, cyanosis, or edema.  Neurology: A&O x3, no focal deficits  Skin: No rashes or lesions  ============================I/O===========================   I&O's Detail    09 Jan 2021 07:01  -  10 Wilson 2021 07:00  --------------------------------------------------------  IN:    Oral Fluid: 700 mL    sodium chloride 3%: 55 mL  Total IN: 755 mL    OUT:    Indwelling Catheter - Urethral (mL): 2525 mL  Total OUT: 2525 mL    Total NET: -1770 mL      10 Wilson 2021 07:01  -  10 Wilson 2021 23:51  --------------------------------------------------------  IN:    IV PiggyBack: 400 mL    Oral Fluid: 330 mL  Total IN: 730 mL    OUT:    Indwelling Catheter - Urethral (mL): 2095 mL  Total OUT: 2095 mL    Total NET: -1365 mL        ============================ LABS =========================                        11.8   13.13 )-----------( 170      ( 10 Wilson 2021 05:10 )             34.7     01-10    123<L>  |  91<L>  |  27<H>  ----------------------------<  187<H>  4.1   |  23  |  0.85    Ca    7.7<L>      10 Wilson 2021 15:18  Phos  3.3     01-10  Mg     2.0     01-09    TPro  6.4  /  Alb  2.9<L>  /  TBili  0.8  /  DBili  x   /  AST  43<H>  /  ALT  61<H>  /  AlkPhos  70  01-10    Troponin T, High Sensitivity Result: 9 ng/L (01-08-21 @ 10:02)              LIVER FUNCTIONS - ( 10 Wilson 2021 05:10 )  Alb: 2.9 g/dL / Pro: 6.4 g/dL / ALK PHOS: 70 U/L / ALT: 61 U/L / AST: 43 U/L / GGT: x           PT/INR - ( 10 Wilson 2021 05:10 )   PT: 13.9 sec;   INR: 1.17 ratio         PTT - ( 10 Wilson 2021 05:10 )  PTT:37.3 sec    Blood Gas Venous - Lactate: 3.8 mmoL/L (01-08-21 @ 13:20)  Lactate, Blood: 1.9 mmol/L (01-08-21 @ 11:44)  Blood Gas Venous - Lactate: 2.2 mmoL/L (01-08-21 @ 10:02)      ======================Micro/Rad/Cardio=================  Telemetry: Reviewed   EKG: Reviewed  CXR: Reviewed  Echo: Reviewed  ======================================================  PAST MEDICAL & SURGICAL HISTORY:  Asthma    GERD (gastroesophageal reflux disease)    HLD (hyperlipidemia)    HTN (hypertension)    Calculus of gallbladder without cholecystitis without obstruction    GERD (gastroesophageal reflux disease)    Asthma  Exacerbates in the winter    DM2 (diabetes mellitus, type 2)    HLD (hyperlipidemia)    HTN (hypertension)    No significant past surgical history      ====================ASSESSMENT ==============  3rd Degree Heart Block     B/L Pleural Effusions w/ mild pulmonary edema on CXR   Hyperkalemia  Hyponatremia  DMT2    Plan:  ====================== NEUROLOGY=====================  A&Ox3  - Previously restless and confused, mental status improved after placement of TVP and improved Hyponatremia   - Continue to monitor neuro status closely as per protocol   - C/w 1:1 observation for safety     Insomnia  - Melatonin 5mg prn for sleep      melatonin 5 milliGRAM(s) Oral at bedtime PRN Sleep    ==================== RESPIRATORY======================  B/L Pleural Effusions w/ mild pulmonary edema on CXR   - SpO2 96% on RA  - Continue to monitor SpO2 via pulse oximetry    Respiratory acidosis with mild metabolic acidosis, in setting of pulmonary edema  - Hold bicarb for now d/t pulmonary edema and fluid overload     ====================CARDIOVASCULAR==================  3rd Degree Heart Block   - Required TVP VVI at rate 60bpm for symptomatic bradycardia and hypotension placed 1/9   - PPM inserted 1/10 w/ Dr. Lieberman.    - Monitor hemodynamics, monitor telemetry  - Trend cardiac enzymes, lactate levels, BMPs    HTN  - C/w Amlodipine 5mg PO QD   - PO Hydralazine 25mg BID  - Monitor BP noninvasively     amLODIPine   Tablet 5 milliGRAM(s) Oral daily  hydrALAZINE 25 milliGRAM(s) Oral two times a day    ===================HEMATOLOGIC/ONC ===================  H/H   11.8 / 34.7  stable  - Continue to monitor hemoglobin and hematocrit levels.     VTE prophylaxis   - Continue SQ Heparin for venous thromboembolism prophylaxis.     heparin   Injectable 5000 Unit(s) SubCutaneous every 8 hours    ===================== RENAL =========================  Hyponatremia, Na 123  - Restless and confused on arrival, given 100cc 3%NS bolus  - trending CMP q4-6hrs  -s/p Lasix 40mg IVP 1/9 Na now improved to 123 from 121.   -Goal Na 120-123, if Na >123 start D5w @75ml/hr and check CMP q2h   - Fluid restriction 1L/day   - Renal consulted    Hyperkalemia, resolved with K 4.4  - s/p Lokelma 10g x2 1/9   -s/p lasix 40mg x1 IVP x1 1/9  - Trend CMP    ==================== GASTROINTESTINAL===================  Consistent carb diet     =======================    ENDOCRINE  =====================  Hx of DMT2   - Glycemic control with Admelog sliding scale and Glucagon PRN.   - Monitor blood glucose levels.   - A1c 6.8 %     dextrose 40% Gel 15 Gram(s) Oral once  dextrose 50% Injectable 25 Gram(s) IV Push once  dextrose 50% Injectable 12.5 Gram(s) IV Push once  dextrose 50% Injectable 25 Gram(s) IV Push once  glucagon  Injectable 1 milliGRAM(s) IntraMuscular once  insulin lispro (ADMELOG) corrective regimen sliding scale   SubCutaneous three times a day before meals  insulin lispro (ADMELOG) corrective regimen sliding scale   SubCutaneous at bedtime    ========================INFECTIOUS DISEASE================  Afebrile, WBC elevated at 13.   - Monitor CBC /  for fever.  - s/p vanco preop for PPM 1/10 AM     Patient requires continuous monitoring with bedside rhythm monitoring, pulse ox monitoring, and intermittent blood gas analysis. Care plan discussed with ICU care team. Patient remained critical and at risk for life threatening decompensation.  Patient seen, examined and plan discussed with CCU team during rounds.     I have personally provided 35 minutes of critical care time excluding time spent on separate procedures.    By signing my name below, I, Cinthia Wild, attest that this documentation has been prepared under the direction and in the presence of NANCY Quintero   Electronically signed: Bartolo Castillo, 01-10-21 @ 23:51    I, NANCY Quintero , personally performed the services described in this documentation. all medical record entries made by the bartolo were at my direction and in my presence. I have reviewed the chart and agree that the record reflects my personal performance and is accurate and complete  Electronically signed: NANCY Quintero      MATA CARRILLO  MRN-[id number]  Patient is a 65y old  Female who presents with a chief complaint of CHB (09 Jan 2021 22:06)    HPI:  65y F w/ PMHx HTN, HLD, GERD, Asthma presenting as a transfer from [place] ED in setting of complete heart block. Patient was feeling weak and tired this morning and vomited x2 and was taken to the emergency department there was found to have complete heart block. Patient denies any chest pain, shortness of breath, fever or previous episodes. Patient was well until 3 days ago when she developed generalized weakness, malaise, and swelling of her hands, face and B/L LE. She has no known history of hypothyroidism. She was taking metoprolol until November for her hx of HTN, but daughter states her prescription ran out and was unable to refill. Daughter denies known sick contacts, recent travel. Ambulates without assistive device at baseline.    ED Course: Pt administered atropine 0.5mg  x 6 by EMS prior to OSH arrival. Pt arrived to [facility] w/ HR in 30s, Hypertensive and pacer pads placed on chest.  (08 Jan 2021 11:25)      Hospital Course:  1/8 Admitted to [facility] CICU for complete heart block. Severe hyponatremia, s/p 100ml 3% NS bolus,NA increased to 119.  K 5.6 Lokelma 10g dose x1 given.   1/9 TVP Placed  1/10 PPM placed with Dr. Lieberman    24 HOUR EVENTS:  [employer] PPM placed today. Reports pain around the PPM pocket.     REVIEW OF SYSTEMS:   Constitutional: + pain at PPM site   Eyes/ENT: No visual changes  Respiratory: no SOB. No cough, wheezing, hemoptysis  Cardiovascular: No chest pain, no palpitations  Gastrointestinal: + N/V. No abdominal pain. No hematemesis.   Genitourinary: No dysuria  Neurological: No numbness, no weakness  Skin: No itching, rashes    ICU Vital Signs Last 24 Hrs  T(C): 37.1 (10 Wilson 2021 22:55), Max: 37.1 (10 Wilson 2021 22:55)  T(F): 98.7 (10 Wilson 2021 22:55), Max: 98.7 (10 Wilson 2021 22:55)  HR: 90 (10 Wilson 2021 22:55) (60 - 99)  BP: 136/79 (10 Wilson 2021 22:55) (90/95 - 163/63)  BP(mean): 94 (10 Wilson 2021 22:55) (68 - 104)  RR: 12 (10 Wilson 2021 22:55) (12 - 27)  SpO2: 98% (10 Wilson 2021 22:55) (94% - 99%)      I&O's Summary    09 Jan 2021 07:01  -  10 Wilson 2021 07:00  --------------------------------------------------------  IN: 755 mL / OUT: 2525 mL / NET: -1770 mL    10 Wilson 2021 07:01  -  10 Wilson 2021 23:51  --------------------------------------------------------  IN: 730 mL / OUT: 2095 mL / NET: -1365 mL      POCT Blood Glucose.: 163 mg/dL (10 Wilson 2021 20:49)      PHYSICAL EXAM:   General: No acute distress  Eyes: EOMI, PERRLA, conjunctiva and sclera clear  Chest/Lung: CTAB, no wheezes, rales, or rhonchi  Heart: Paced rhythm- PPM . Normal S1/S2. No murmurs, rubs, or gallops.  Abdomen: Soft, nontender, nondistended. Normal bowel sounds.  Extremites: 2+ peripheral pulses B/L. No clubbing, cyanosis, or edema.  Neurology: A&O x3, no focal deficits  Skin: No rashes or lesions. PPM site without hematoma. Tender to palpation.   ============================I/O===========================   I&O's Detail    09 Jan 2021 07:01  -  10 Wilson 2021 07:00  --------------------------------------------------------  IN:    Oral Fluid: 700 mL    sodium chloride 3%: 55 mL  Total IN: 755 mL    OUT:    Indwelling Catheter - Urethral (mL): 2525 mL  Total OUT: 2525 mL    Total NET: -1770 mL      10 Wilson 2021 07:01  -  10 Wilson 2021 23:51  --------------------------------------------------------  IN:    IV PiggyBack: 400 mL    Oral Fluid: 330 mL  Total IN: 730 mL    OUT:    Indwelling Catheter - Urethral (mL): 2095 mL  Total OUT: 2095 mL    Total NET: -1365 mL        ============================ LABS =========================                        11.8   13.13 )-----------( 170      ( 10 Wilson 2021 05:10 )             34.7     01-10    123<L>  |  91<L>  |  27<H>  ----------------------------<  187<H>  4.1   |  23  |  0.85    Ca    7.7<L>      10 Wilson 2021 15:18  Phos  3.3     01-10  Mg     2.0     01-09    TPro  6.4  /  Alb  2.9<L>  /  TBili  0.8  /  DBili  x   /  AST  43<H>  /  ALT  61<H>  /  AlkPhos  70  01-10    Troponin T, High Sensitivity Result: 9 ng/L (01-08-21 @ 10:02)              LIVER FUNCTIONS - ( 10 Wilson 2021 05:10 )  Alb: 2.9 g/dL / Pro: 6.4 g/dL / ALK PHOS: 70 U/L / ALT: 61 U/L / AST: 43 U/L / GGT: x           PT/INR - ( 10 Wilson 2021 05:10 )   PT: 13.9 sec;   INR: 1.17 ratio         PTT - ( 10 Wilson 2021 05:10 )  PTT:37.3 sec    Blood Gas Venous - Lactate: 3.8 mmoL/L (01-08-21 @ 13:20)  Lactate, Blood: 1.9 mmol/L (01-08-21 @ 11:44)  Blood Gas Venous - Lactate: 2.2 mmoL/L (01-08-21 @ 10:02)      ======================Micro/Rad/Cardio=================  Telemetry: Reviewed   EKG: Reviewed  CXR: Reviewed  Echo: Reviewed  ======================================================  PAST MEDICAL & SURGICAL HISTORY:  Asthma    GERD (gastroesophageal reflux disease)    HLD (hyperlipidemia)    HTN (hypertension)    Calculus of gallbladder without cholecystitis without obstruction    GERD (gastroesophageal reflux disease)    Asthma  Exacerbates in the winter    DM2 (diabetes mellitus, type 2)    HLD (hyperlipidemia)    HTN (hypertension)    No significant past surgical history      ====================ASSESSMENT ==============  3rd Degree Heart Block     B/L Pleural Effusions w/ mild pulmonary edema on CXR   Hyperkalemia  Hyponatremia  DMT2    Plan:  ====================== NEUROLOGY=====================  A&Ox3  - Previously restless and confused, mental status improved after placement of TVP and improved Hyponatremia   - Continue to monitor neuro status closely as per protocol     Insomnia  - Melatonin 5mg prn for sleep      melatonin 5 milliGRAM(s) Oral at bedtime PRN Sleep    ==================== RESPIRATORY======================  B/L Pleural Effusions w/ mild pulmonary edema on CXR   - SpO2 96% on RA  - Continue to monitor SpO2 via pulse oximetry    ====================CARDIOVASCULAR==================  CHB   - Required TVP VVI at rate 60bpm for symptomatic bradycardia and hypotension placed 1/9   - Medtronic PPM inserted today; PA and lateral CXR in AM   - Monitor hemodynamics, monitor telemetry  - Trend cardiac enzymes, lactate levels, BMPs  - Stable for floor transfer     HTN  - C/w Amlodipine 5mg PO QD   - PO Hydralazine 25mg BID  - Monitor BP noninvasively     amLODIPine   Tablet 5 milliGRAM(s) Oral daily  hydrALAZINE 25 milliGRAM(s) Oral two times a day    ===================HEMATOLOGIC/ONC ===================  H/H   11.8 / 34.7  stable  - Continue to monitor hemoglobin and hematocrit levels.     VTE prophylaxis   - Continue SQ Heparin for venous thromboembolism prophylaxis.     heparin   Injectable 5000 Unit(s) SubCutaneous every 8 hours    ===================== RENAL =========================  Hyponatremia, improving   - hypervolemic hypona vs siadh  - Na 123, trend CMP  - Lasix 40 per renal   - Fluid restriction 1L/day     Hyperkalemia, resolved with K 4.4  - s/p Lokelma 10g x2 1/9   -s/p lasix 40mg x1 IVP x1 1/9  - Trend CMP    ==================== GASTROINTESTINAL===================  Consistent carb diet with fluid restriction (1L)    =======================    ENDOCRINE  =====================  Hx of DMT2   - Glycemic control with Admelog sliding scale and Glucagon PRN.   - Monitor blood glucose levels.   - A1c 6.8 %     dextrose 40% Gel 15 Gram(s) Oral once  dextrose 50% Injectable 25 Gram(s) IV Push once  dextrose 50% Injectable 12.5 Gram(s) IV Push once  dextrose 50% Injectable 25 Gram(s) IV Push once  glucagon  Injectable 1 milliGRAM(s) IntraMuscular once  insulin lispro (ADMELOG) corrective regimen sliding scale   SubCutaneous three times a day before meals  insulin lispro (ADMELOG) corrective regimen sliding scale   SubCutaneous at bedtime    ========================INFECTIOUS DISEASE================  Afebrile, WBC elevated at 13.   - Monitor CBC /  for fever.  - s/p vanco preop for PPM 1/10 AM     Patient requires continuous monitoring with bedside rhythm monitoring, pulse ox monitoring, and intermittent blood gas analysis. Care plan discussed with ICU care team. Patient remained critical and at risk for life threatening decompensation.  Patient seen, examined and plan discussed with CCU team during rounds.     I have personally provided 35 minutes of critical care time excluding time spent on separate procedures.    By signing my name below, I, Cinthia Wild, attest that this documentation has been prepared under the direction and in the presence of NANCY Quintero   Electronically signed: Regina Castillo, 01-10-21 @ 23:51    I, NANCY Quintero , personally performed the services described in this documentation. all medical record entries made by the phyliciaibsamira were at my direction and in my presence. I have reviewed the chart and agree that the record reflects my personal performance and is accurate and complete  Electronically signed: NANCY Quintero

## 2021-01-10 NOTE — PROGRESS NOTE ADULT - ASSESSMENT
65F PMHx HTN, HLD, GERD, Asthma who was transferred from [place] ED to [facility] for complete heart block. Nephrology consulted for hyponatremia (117).      # Hyponatremia-hypervolemic  Pt with hyponatremia hypervolemia likely 2/2 heart failure from complete heart block causing high ADH and/or low solute intake (tea and toast diet) or  possible SIADH in setting nausea/vomiting, oxybutynin.  On admission serum Na 117 on 1/8/21 (prior sNa 144 on 12/22/20) dropped to 115 after NS bolus consistent w/ high ADH state.  Labs noted for Uosm 395, sOsm 268, Atul<35, pro BNP 6076. Improved with 3% and lasix IV  on 1/9/21  trended up to 121.    Recommendations:   - this morning Sodium from 121 to 123. s/p 1 dose IV lasix 40mg yesterday and today Uosm: 289  - Continue with FLUID RESTRICTION 1 liter per day, avoid hypotonic solution (1/2 NS, D5W) including additive in IV medications if possible  - closely monitor UOP   - repeat sodium this afternoon; if trending down or stable ok to give another dose of lasix this afternoon  - Glycemic control, HOLD oxybutynin, avoid any thiazide or SSRI  - check morning cortisol level  - treat nausea/vomiting/pain symptoms to decrease possibly ADH triggers       # Hyperkalemia   resolved   - monitor BMP, low K diet    # Acidosis  Pt with acidosis, primary respiratory acidosis in the setting pulmonary edema +mild metabolic.  On admission vbg pH 7.25, pCO2 53, HCO3 23, lactate 2.2>3.8  - treat underlying respiratory process, would hold off on bicarbonate for now given pulmonary edema/fluid overload.     Please repeat ABG today

## 2021-01-10 NOTE — CHART NOTE - NSCHARTNOTEFT_GEN_A_CORE
Procedure performed:		 Dual Chamber Pacemaker Implantation (00797)    Procedure Date:                         1/10/2021    : James Lieberman MD    Referring MD:                            Freda Holloway MD    Preprocedure Diagnoses:    1. Symptomatic bradycardia due to complete heart block requiring a TVP due to escape below 30 bpm  2. Underlying sinus with 3rd degree heart block and junctional escape at 60 bpm with a wide RBBB/LPHB  3. LVEF normal  4. Mild leukocytosis without fever  5. Hyponatremia and mild confusion    Postprocedure Diagnoses:    1. Successful implantation of an MRI compatible Left subclavicular Dual Chamber Medtronic pacemaker   Generator:  Model W1DR01 serial # [id number] in the left subclavicular area   2. RA lead:  SUZETTE 52 bipolar passive fixation model 4574-53, serial # [id number] via left cephalic vein to RAA  3.  RV lead: MDPAO bipolar Active fixation model 3830-69, serial # [id number] via left cephalic vein to RV mid septum and achieving a QRS duration of 100 ms   4. Removal sheath and temporary pacing catheter from the RIJ vein    Complications:  None apparent    EBL: < 50 cc    Fluoroscopy: 6.5 min, 40 mGy,  [id number]    Anesthesiologist: EP anesthesia MAC     Preprocedure antibiotics:  IV Vancomycin and Cefazolin      Brief History: 65 year old woman presenting with weakness and complete heart block with symptoms compatible with development over 2-3 days. Echo showed normal LV function. A temporary wire was placed for pacing due to worsening mental status which has improved but she has intermittent mild confusion due to hyponatremia.    Procedure Details:  Berto Robles was brought to the Electrophysiology Laboratory in the post absorptive state after informed consent was obtained. She was prepped and draped in the usual manner. Formal time out was performed.     2% lidocaine with epi was infiltrated along the left deltopectoral groove. A 5 cm incision was made with a 10 blade. Using sharp and blunt dissection and electrocautery, the pectoralis major and deltopectoral groove was exposed. Further dissection exposed the cephalic vein which was isolated with 0-0 silk.  An 11 blade was used to make a venotomy and both lead were advanced to the right heart under fluoroscopy after 7 F sheaths were placed.     The RV lead was placed in the mid RV septum by advancing it through the His sheath and screwed in wit 10-12 turns. The atrial lead was then passed to the RAA and the stylet was pulled back for the tined J to go into the RAA. . Lead slack appeared excellent. The lead did not dislodge after the stylet and delivery sheaths were removed. The leads secured to the pectoralis major fascia using 2-0 silk around the suture sleeves. The QRS pacing morphology was compatible with capture of the deep septum and his Purkinje system.    Lead parameters:                            Sensing         Threshold         Impedance  RA         1.8 mV             1.0V/0.4 ms         741 ohms  RV         18.3mV            0.75V/0.4 ms       571 ohms       A pocket was then made above the pectoralis major.  Bleeding sites were cauterized. The pocket was irrigated with antibiotic solution. The leads were connected to the pacemaker generator and set screws were tightened. Surgicel powder was placed in the pocket for oozing. The generator was placed in a Tyrx pouch and placed in the pocket.  The pocket was then closed with 2-0, 3-0 and 4-0 Vicryl.  Staples were applied to the skin, Sponge and needle counts were correct. Chest X Ray ordered for the am.  Device programmed to DDDR 60/110.   The patient will go back to the [facility].

## 2021-01-10 NOTE — PROGRESS NOTE ADULT - SUBJECTIVE AND OBJECTIVE BOX
[facility] DIVISION OF KIDNEY DISEASES AND HYPERTENSION -- FOLLOW UP NOTE  --------------------------------------------------------------------------------  If any questions, please feel free to contact me  NS pager: [phone], [facility]: [phone]  Zac Graves M.D.  Nephrology Fellow    (After 5 pm or on weekends please page the on-call fellow)  --------------------------------------------------------------------------------    Chief Complaint:  Patient is a 65y old  Female who presents with a chief complaint of CHB (09 Jan 2021 22:06)    24 hour events/subjective:  Patient seen and examined at bedside, in NAD, s/p 1 dose of lasix yesterday, today Na at 123 from 121. UOP: 2.5L in the past 24hrs. Vitals/labs/imaging reviewed.       PAST HISTORY  --------------------------------------------------------------------------------  No significant changes to PMH, PSH, FHx, SHx, unless otherwise noted    ALLERGIES & MEDICATIONS  --------------------------------------------------------------------------------  Allergies    No Known Allergies    Intolerances      Standing Inpatient Medications  amLODIPine   Tablet 5 milliGRAM(s) Oral daily  chlorhexidine 4% Liquid 1 Application(s) Topical <User Schedule>  dextrose 40% Gel 15 Gram(s) Oral once  dextrose 50% Injectable 25 Gram(s) IV Push once  dextrose 50% Injectable 12.5 Gram(s) IV Push once  dextrose 50% Injectable 25 Gram(s) IV Push once  glucagon  Injectable 1 milliGRAM(s) IntraMuscular once  heparin   Injectable 5000 Unit(s) SubCutaneous every 8 hours  hydrALAZINE 10 milliGRAM(s) Oral daily  insulin lispro (ADMELOG) corrective regimen sliding scale   SubCutaneous three times a day before meals  insulin lispro (ADMELOG) corrective regimen sliding scale   SubCutaneous at bedtime  sodium chloride 3%. 100 milliLiter(s) IV Continuous <Continuous>  vancomycin  IVPB 1000 milliGRAM(s) IV Intermittent once    PRN Inpatient Medications  melatonin 5 milliGRAM(s) Oral at bedtime PRN      REVIEW OF SYSTEMS  --------------------------------------------------------------------------------  Gen: No fevers/chills  Skin: No rashes  Head/Eyes/Ears: Normal hearing,   Respiratory: No dyspnea, cough  CV: No chest pain  GI: No abdominal pain, diarrhea  : No dysuria, hematuria  MSK: + LE edema  Heme: No easy bruising or bleeding  Psych: No significant depression      All other systems were reviewed and are negative, except as noted.    VITALS/PHYSICAL EXAM  --------------------------------------------------------------------------------  T(C): 37.1 (01-09-21 @ 17:30), Max: 37.1 (01-09-21 @ 17:30)  HR: 60 (01-10-21 @ 07:00) (60 - 114)  BP: 112/73 (01-10-21 @ 05:00) (90/95 - 163/63)  RR: 21 (01-10-21 @ 05:00) (18 - 25)  SpO2: 95% (01-10-21 @ 05:00) (94% - 98%)  Wt(kg): --  Height (cm): 152.4 (01-08-21 @ 10:30)  Weight (kg): 65.7 (01-08-21 @ 10:30)  BMI (kg/m2): 28.3 (01-08-21 @ 10:30)  BSA (m2): 1.63 (01-08-21 @ 10:30)      01-09-21 @ 07:01  -  01-10-21 @ 07:00  --------------------------------------------------------  IN: 755 mL / OUT: 2525 mL / NET: -1770 mL        Physical Exam:  	Gen: NAD,    	HEENT:   no jvp  	Pulm: coarse breath sounds   	CV: RRR, S1S2; no rub  	Back:   no sacral edema  	Abd: +BS, soft,    	: No suprapubic tenderness  	Ext: 2+edema  	Neuro: awake  	Psych: alert  	Skin: Warm   	Vascular access:      LABS/STUDIES  --------------------------------------------------------------------------------              11.8   13.13 >-----------<  170      [01-10-21 @ 05:10]              34.7     123  |  91  |  28  ----------------------------<  172      [01-10-21 @ 05:10]  4.4   |  22  |  0.91        Ca     7.9     [01-10-21 @ 05:10]      Mg     2.0     [01-09-21 @ 06:36]      Phos  3.3     [01-10-21 @ 05:10]    TPro  6.4  /  Alb  2.9  /  TBili  0.8  /  DBili  x   /  AST  43  /  ALT  61  /  AlkPhos  70  [01-10-21 @ 05:10]    PT/INR: PT 13.9 , INR 1.17       [01-10-21 @ 05:10]  PTT: 37.3       [01-10-21 @ 05:10]    Serum Osmolality 262      [01-08-21 @ 12:21]    Creatinine Trend:  SCr 0.91 [01-10 @ 05:10]  SCr 1.01 [01-09 @ 21:02]  SCr 1.02 [01-09 @ 15:00]  SCr 1.10 [01-09 @ 06:36]  SCr 1.22 [01-09 @ 01:26]      Urine Creatinine 119      [01-08-21 @ 12:48]  Urine Sodium <35      [01-08-21 @ 12:48]  Urine Osmolality 285      [01-09-21 @ 15:22]    HbA1c 6.5      [12-03-18 @ 22:38]  TSH 1.57      [01-08-21 @ 13:11]  Lipid: chol 80, TG 89, HDL 36, LDL --      [01-08-21 @ 18:24]    HCV 0.09, Nonreact      [01-09-21 @ 12:17]     [facility] DIVISION OF KIDNEY DISEASES AND HYPERTENSION -- FOLLOW UP NOTE  --------------------------------------------------------------------------------  If any questions, please feel free to contact me  NS pager: [phone], [facility]: [phone]  Zac Graves M.D.  Nephrology Fellow    (After 5 pm or on weekends please page the on-call fellow)  --------------------------------------------------------------------------------    Chief Complaint:  Patient is a 65y old  Female who presents with a chief complaint of CHB (09 Jan 2021 22:06)    24 hour events/subjective:  Patient seen and examined at bedside, in NAD, s/p 1 dose of lasix yesterday, today Na at 123 from 121. UOP: 2.5L in the past 24hrs. Vitals/labs/imaging reviewed.       PAST HISTORY  --------------------------------------------------------------------------------  No significant changes to PMH, PSH, FHx, SHx, unless otherwise noted    ALLERGIES & MEDICATIONS  --------------------------------------------------------------------------------  Allergies    No Known Allergies    Intolerances      Standing Inpatient Medications  amLODIPine   Tablet 5 milliGRAM(s) Oral daily  chlorhexidine 4% Liquid 1 Application(s) Topical <User Schedule>  dextrose 40% Gel 15 Gram(s) Oral once  dextrose 50% Injectable 25 Gram(s) IV Push once  dextrose 50% Injectable 12.5 Gram(s) IV Push once  dextrose 50% Injectable 25 Gram(s) IV Push once  glucagon  Injectable 1 milliGRAM(s) IntraMuscular once  heparin   Injectable 5000 Unit(s) SubCutaneous every 8 hours  hydrALAZINE 10 milliGRAM(s) Oral daily  insulin lispro (ADMELOG) corrective regimen sliding scale   SubCutaneous three times a day before meals  insulin lispro (ADMELOG) corrective regimen sliding scale   SubCutaneous at bedtime  sodium chloride 3%. 100 milliLiter(s) IV Continuous <Continuous>  vancomycin  IVPB 1000 milliGRAM(s) IV Intermittent once    PRN Inpatient Medications  melatonin 5 milliGRAM(s) Oral at bedtime PRN      REVIEW OF SYSTEMS  --------------------------------------------------------------------------------  Gen: No fevers/chills  Skin: No rashes  Head/Eyes/Ears: Normal hearing,   Respiratory: No dyspnea, cough  CV: No chest pain  GI: No abdominal pain, diarrhea  : No dysuria, hematuria  MSK: + LE edema  Heme: No easy bruising or bleeding  Psych: No significant depression      All other systems were reviewed and are negative, except as noted.    VITALS/PHYSICAL EXAM  --------------------------------------------------------------------------------  T(C): 37.1 (01-09-21 @ 17:30), Max: 37.1 (01-09-21 @ 17:30)  HR: 60 (01-10-21 @ 07:00) (60 - 114)  BP: 112/73 (01-10-21 @ 05:00) (90/95 - 163/63)  RR: 21 (01-10-21 @ 05:00) (18 - 25)  SpO2: 95% (01-10-21 @ 05:00) (94% - 98%)  Wt(kg): --  Height (cm): 152.4 (01-08-21 @ 10:30)  Weight (kg): 65.7 (01-08-21 @ 10:30)  BMI (kg/m2): 28.3 (01-08-21 @ 10:30)  BSA (m2): 1.63 (01-08-21 @ 10:30)      01-09-21 @ 07:01  -  01-10-21 @ 07:00  --------------------------------------------------------  IN: 755 mL / OUT: 2525 mL / NET: -1770 mL        Physical Exam:  	Gen: NAD,    	HEENT:   no jvp  	Pulm: coarse breath sounds   	CV: RRR, S1S2; no rub  	Back:   no sacral edema  	Abd: +BS, soft,    	: No suprapubic tenderness  	Ext:1+edema  	Neuro: awake  	Psych: alert  	Skin: Warm   	Vascular access:      LABS/STUDIES  --------------------------------------------------------------------------------              11.8   13.13 >-----------<  170      [01-10-21 @ 05:10]              34.7     123  |  91  |  28  ----------------------------<  172      [01-10-21 @ 05:10]  4.4   |  22  |  0.91        Ca     7.9     [01-10-21 @ 05:10]      Mg     2.0     [01-09-21 @ 06:36]      Phos  3.3     [01-10-21 @ 05:10]    TPro  6.4  /  Alb  2.9  /  TBili  0.8  /  DBili  x   /  AST  43  /  ALT  61  /  AlkPhos  70  [01-10-21 @ 05:10]    PT/INR: PT 13.9 , INR 1.17       [01-10-21 @ 05:10]  PTT: 37.3       [01-10-21 @ 05:10]    Serum Osmolality 262      [01-08-21 @ 12:21]    Creatinine Trend:  SCr 0.91 [01-10 @ 05:10]  SCr 1.01 [01-09 @ 21:02]  SCr 1.02 [01-09 @ 15:00]  SCr 1.10 [01-09 @ 06:36]  SCr 1.22 [01-09 @ 01:26]      Urine Creatinine 119      [01-08-21 @ 12:48]  Urine Sodium <35      [01-08-21 @ 12:48]  Urine Osmolality 285      [01-09-21 @ 15:22]    HbA1c 6.5      [12-03-18 @ 22:38]  TSH 1.57      [01-08-21 @ 13:11]  Lipid: chol 80, TG 89, HDL 36, LDL --      [01-08-21 @ 18:24]    HCV 0.09, Nonreact      [01-09-21 @ 12:17]

## 2021-01-10 NOTE — CHART NOTE - NSCHARTNOTEFT_GEN_A_CORE
CCU Transfer Note    Transfer from: CCU    Transfer to: (  ) Medicine    ( x ) Telemetry    (  ) RCU  (  ) Palliative    (  ) Stroke Unit    (  ) MICU    (  ) __________________    Accepting Physician:    Signout given to:     HPI / CCU COURSE:  65y F w/ PMHx HTN, HLD, GERD, Asthma presenting as a transfer from [place] ED in setting of complete heart block. Patient was feeling weak and tired this morning and vomited x2 and was taken to the emergency department there was found to have complete heart block. Patient denies any chest pain, shortness of breath, fever or previous episodes. Patient was well until 3 days ago when she developed generalized weakness, malaise, and swelling of her hands, face and B/L LE. She has no known history of hypothyroidism. She was taking metoprolol until November for her hx of HTN, but daughter states her prescription ran out and was unable to refill. Daughter denies known sick contacts, recent travel. Ambulates without assistive device at baseline.    ED Course: Pt administered atropine 0.5mg  x 6 by EMS prior to OSH arrival. Pt arrived to [facility] w/ HR in 30s, Hypertensive and pacer pads placed on chest.  (08 Jan 2021 11:25)    Hospital Course:  1/8 Admitted to [facility] CICU for complete heart block. Severe hyponatremia, s/p 100ml 3% NS bolus,NA increased to 119.  K 5.6 Lokelma 10g dose x1 given.   1/9 TVP Placed  1/10 PPM placed              Vital Signs Last 24 Hrs  T(C): 36.7 (10 Wilson 2021 07:00), Max: 37.1 (09 Jan 2021 17:30)  T(F): 98 (10 Wilson 2021 07:00), Max: 98.8 (09 Jan 2021 17:30)  HR: 75 (10 Wilson 2021 11:22) (60 - 114)  BP: 115/58 (10 Wilson 2021 11:22) (90/95 - 163/63)  BP(mean): 68 (10 Wilson 2021 11:22) (68 - 94)  RR: 24 (10 Wilson 2021 11:22) (18 - 27)  SpO2: 96% (10 Wilson 2021 11:22) (94% - 98%)    I&O's Summary    09 Jan 2021 07:01  -  10 Wilson 2021 07:00  --------------------------------------------------------  IN: 755 mL / OUT: 2525 mL / NET: -1770 mL    10 Wilson 2021 07:01  -  10 Wilson 2021 14:04  --------------------------------------------------------  IN: 250 mL / OUT: 150 mL / NET: 100 mL        Physical Exam:   GENERAL: No acute distress, well-developed  HEAD:  Atraumatic, Normocephalic  EYES: EOMI, PERRLA, conjunctiva and sclera clear  NECK: Supple, no lymphadenopathy, no JVD  CHEST/LUNG: Decreased breath sounds, mild wheezes  HEART: Regular rate and rhythm. Normal S1/S2. No murmurs, rubs, or gallops  ABDOMEN: Soft, non-tender, non-distended; normal bowel sounds, no organomegaly  EXTREMITIES:  2+ peripheral pulses b/l, No clubbing, cyanosis, or edema  NEUROLOGY: A&O x 3, no focal deficits  SKIN: No rashes or lesions    LABS:                               11.8   13.13 )-----------( 170      ( 10 Iwlson 2021 05:10 )             34.7       01-10    123<L>  |  91<L>  |  28<H>  ----------------------------<  172<H>  4.4   |  22  |  0.91    Ca    7.9<L>      10 Wilson 2021 05:10  Phos  3.3     01-10  Mg     2.0     01-09    TPro  6.4  /  Alb  2.9<L>  /  TBili  0.8  /  DBili  x   /  AST  43<H>  /  ALT  61<H>  /  AlkPhos  70  01-10      PT/INR - ( 10 Wilson 2021 05:10 )   PT: 13.9 sec;   INR: 1.17 ratio         PTT - ( 10 Wilson 2021 05:10 )  PTT:37.3 sec        ASSESSMENT & PLAN:   ====================ASSESSMENT ==============  65F w/ PMH of HTN, HLD, GERD, asthma admitted for complete heart block    3rd Degree Heart Block   B/L Pleural Effusions w/ mild pulmonary edema on CXR   Hyperkalemia  Hyponatremia  DMT2    Plan:  ====================== NEUROLOGY=====================  AOx3 today; AMS improved post TVP placement and improvement of hyponatremia   - Na 123 this morning  - Continue to monitor neuro status closely as per protocol   - Melatonin 5mg prn for sleep      melatonin 5 milliGRAM(s) Oral at bedtime PRN Sleep    ==================== RESPIRATORY======================  B/L Pleural Effusions w/ mild pulmonary edema on CXR   - SpO2 96% on RA  - Continue to monitor SpO2 via pulse oximetry    ====================CARDIOVASCULAR==================  3rd Degree Heart Block   - s/p TVP VVI at rate 60bpm for symptomatic bradycardia and hypotension   - s/p PPM by EP today. Vanco 1g in the AM prior to OR   - Monitor hemodynamics, monitor telemetry  - Monitor BMP    HTN  - C/w Amlodipine 5mg PO QD   - PO Hydralazine 25mg BID  - Monitor BP noninvasively     amLODIPine   Tablet 5 milliGRAM(s) Oral daily  hydrALAZINE 10 milliGRAM(s) Oral daily    ===================HEMATOLOGIC/ONC ===================  H/H stable  - Continue to monitor hemoglobin and hematocrit levels.     VTE prophylaxis   - Continue SQ Heparin for venous thromboembolism prophylaxis.     heparin   Injectable 5000 Unit(s) SubCutaneous every 8 hours    ===================== RENAL =========================  Hyponatremia, improving  - Na 123  - trending CMP q4-6hrs  - If Na <=123 on repeat today, repeat Lasix IV 40  - Fluid restriction 1L/day   - Renal following    Hyperkalemia, resolved  - K 4.4  - Trend CMP    ==================== GASTROINTESTINAL===================  Resume diet after PPM today      =======================    ENDOCRINE  =====================  Hx of DMT2   - Glycemic control with Admelog sliding scale and Glucagon PRN.   - Monitor blood glucose levels.   - A1c 6.8 %     dextrose 40% Gel 15 Gram(s) Oral once  dextrose 50% Injectable 25 Gram(s) IV Push once  dextrose 50% Injectable 12.5 Gram(s) IV Push once  dextrose 50% Injectable 25 Gram(s) IV Push once  glucagon  Injectable 1 milliGRAM(s) IntraMuscular once  insulin lispro (ADMELOG) corrective regimen sliding scale   SubCutaneous three times a day before meals  insulin lispro (ADMELOG) corrective regimen sliding scale   SubCutaneous at bedtime    ========================INFECTIOUS DISEASE================  Afebrile, WBC elevated to 13.13  - Monitor for leukocytosis / fever.  - ordered vanco for perioperative antibiotic coverage        FOR FOLLOW UP:  [ ] Monitor on telemetry  [ ] F/u BMP's for Na level; renal recs CCU Transfer Note    Transfer from: CCU    Transfer to: (  ) Medicine    ( x ) Telemetry    (  ) RCU  (  ) Palliative    (  ) Stroke Unit    (  ) MICU    (  ) __________________    Accepting Physician: Aaliyah    Signout given to:     HPI / CCU COURSE:  65y F w/ PMHx HTN, HLD, GERD, Asthma presenting as a transfer from [place] ED in setting of complete heart block. Patient was feeling weak and tired this morning and vomited x2 and was taken to the emergency department there was found to have complete heart block. Patient denies any chest pain, shortness of breath, fever or previous episodes. Patient was well until 3 days ago when she developed generalized weakness, malaise, and swelling of her hands, face and B/L LE. She has no known history of hypothyroidism. She was taking metoprolol until November for her hx of HTN, but daughter states her prescription ran out and was unable to refill. Daughter denies known sick contacts, recent travel. Ambulates without assistive device at baseline.    ED Course: Pt administered atropine 0.5mg  x 6 by EMS prior to OSH arrival. Pt arrived to [facility] w/ HR in 30s, Hypertensive and pacer pads placed on chest.  (08 Jan 2021 11:25)    Hospital Course:  1/8 Admitted to [facility] CICU for complete heart block. Severe hyponatremia, s/p 100ml 3% NS bolus,NA increased to 119.  K 5.6 Lokelma 10g dose x1 given.   1/9 TVP Placed  1/10 PPM placed        Vital Signs Last 24 Hrs  T(C): 36.7 (10 Wilson 2021 07:00), Max: 37.1 (09 Jan 2021 17:30)  T(F): 98 (10 Wilson 2021 07:00), Max: 98.8 (09 Jan 2021 17:30)  HR: 75 (10 Wilson 2021 11:22) (60 - 114)  BP: 115/58 (10 Wilson 2021 11:22) (90/95 - 163/63)  BP(mean): 68 (10 Wilson 2021 11:22) (68 - 94)  RR: 24 (10 Wilson 2021 11:22) (18 - 27)  SpO2: 96% (10 Wilson 2021 11:22) (94% - 98%)    I&O's Summary    09 Jan 2021 07:01  -  10 Wilson 2021 07:00  --------------------------------------------------------  IN: 755 mL / OUT: 2525 mL / NET: -1770 mL    10 Wilson 2021 07:01  -  10 Wilson 2021 14:04  --------------------------------------------------------  IN: 250 mL / OUT: 150 mL / NET: 100 mL        Physical Exam:   GENERAL: No acute distress, well-developed  HEAD:  Atraumatic, Normocephalic  EYES: EOMI, PERRLA, conjunctiva and sclera clear  NECK: Supple, no lymphadenopathy, no JVD  CHEST/LUNG: Decreased breath sounds, mild wheezes  HEART: Regular rate and rhythm. Normal S1/S2. No murmurs, rubs, or gallops  ABDOMEN: Soft, non-tender, non-distended; normal bowel sounds, no organomegaly  EXTREMITIES:  2+ peripheral pulses b/l, No clubbing, cyanosis, or edema  NEUROLOGY: A&O x 3, no focal deficits  SKIN: No rashes or lesions    LABS:                               11.8   13.13 )-----------( 170      ( 10 Wilson 2021 05:10 )             34.7       01-10    123<L>  |  91<L>  |  28<H>  ----------------------------<  172<H>  4.4   |  22  |  0.91    Ca    7.9<L>      10 Wilson 2021 05:10  Phos  3.3     01-10  Mg     2.0     01-09    TPro  6.4  /  Alb  2.9<L>  /  TBili  0.8  /  DBili  x   /  AST  43<H>  /  ALT  61<H>  /  AlkPhos  70  01-10      PT/INR - ( 10 Wilson 2021 05:10 )   PT: 13.9 sec;   INR: 1.17 ratio         PTT - ( 10 Wilson 2021 05:10 )  PTT:37.3 sec        ASSESSMENT & PLAN:   ====================ASSESSMENT ==============  65F w/ PMH of HTN, HLD, GERD, asthma admitted for complete heart block    3rd Degree Heart Block   B/L Pleural Effusions w/ mild pulmonary edema on CXR   Hyperkalemia  Hyponatremia  DMT2    Plan:  ====================== NEUROLOGY=====================  AOx3 today; AMS improved post TVP placement and improvement of hyponatremia   - Na 123 this morning  - Continue to monitor neuro status closely as per protocol   - Melatonin 5mg prn for sleep      melatonin 5 milliGRAM(s) Oral at bedtime PRN Sleep    ==================== RESPIRATORY======================  B/L Pleural Effusions w/ mild pulmonary edema on CXR   - SpO2 96% on RA  - Continue to monitor SpO2 via pulse oximetry    ====================CARDIOVASCULAR==================  3rd Degree Heart Block   - s/p TVP VVI at rate 60bpm for symptomatic bradycardia and hypotension   - s/p PPM by EP today. Vanco 1g in the AM prior to OR   - Monitor hemodynamics, monitor telemetry  - Monitor BMP    HTN  - C/w Amlodipine 5mg PO QD   - PO Hydralazine 25mg BID  - Monitor BP noninvasively     amLODIPine   Tablet 5 milliGRAM(s) Oral daily  hydrALAZINE 10 milliGRAM(s) Oral daily    ===================HEMATOLOGIC/ONC ===================  H/H stable  - Continue to monitor hemoglobin and hematocrit levels.     VTE prophylaxis   - Continue SQ Heparin for venous thromboembolism prophylaxis.     heparin   Injectable 5000 Unit(s) SubCutaneous every 8 hours    ===================== RENAL =========================  Hyponatremia, improving  - Na 123  - trending CMP q4-6hrs  - If Na <=123 on repeat today, repeat Lasix IV 40  - Fluid restriction 1L/day   - Renal following    Hyperkalemia, resolved  - K 4.4  - Trend CMP    ==================== GASTROINTESTINAL===================  Tolerating CC diet    =======================    ENDOCRINE  =====================  Hx of DMT2   - Glycemic control with Admelog sliding scale and Glucagon PRN.   - Monitor blood glucose levels.   - A1c 6.8 %     dextrose 40% Gel 15 Gram(s) Oral once  dextrose 50% Injectable 25 Gram(s) IV Push once  dextrose 50% Injectable 12.5 Gram(s) IV Push once  dextrose 50% Injectable 25 Gram(s) IV Push once  glucagon  Injectable 1 milliGRAM(s) IntraMuscular once  insulin lispro (ADMELOG) corrective regimen sliding scale   SubCutaneous three times a day before meals  insulin lispro (ADMELOG) corrective regimen sliding scale   SubCutaneous at bedtime    ========================INFECTIOUS DISEASE================  Afebrile, WBC elevated to 13.13  - Monitor for leukocytosis / fever.  - ordered vanco for perioperative antibiotic coverage        FOR FOLLOW UP:  [ ] Monitor on telemetry  [ ] F/u this afternoon's BMP for Na level; if Na <=123, give another IVP Lasix 40mg (Renal following) CCU Transfer Note    Transfer from: CCU    Transfer to: (  ) Medicine    ( x ) Telemetry    (  ) RCU  (  ) Palliative    (  ) Stroke Unit    (  ) MICU    (  ) __________________    Accepting Physician: Aaliyah    Signout given to:     HPI / CCU COURSE:  65y F w/ PMHx HTN, HLD, GERD, Asthma presenting as a transfer from [place] ED in setting of complete heart block. Patient was feeling weak and tired this morning and vomited x2 and was taken to the emergency department there was found to have complete heart block. Patient denies any chest pain, shortness of breath, fever or previous episodes. Patient was well until 3 days ago when she developed generalized weakness, malaise, and swelling of her hands, face and B/L LE. She has no known history of hypothyroidism. She was taking metoprolol until November for her hx of HTN, but daughter states her prescription ran out and was unable to refill. Daughter denies known sick contacts, recent travel. Ambulates without assistive device at baseline.    ED Course: Pt administered atropine 0.5mg  x 6 by EMS prior to OSH arrival. Pt arrived to [facility] w/ HR in 30s, Hypertensive and pacer pads placed on chest.  (08 Jan 2021 11:25)    Hospital Course:  1/8 Admitted to [facility] CICU for complete heart block. Severe hyponatremia, s/p 100ml 3% NS bolus,NA increased to 119.  K 5.6 Lokelma 10g dose x1 given.   1/9 TVP Placed  1/10 PPM placed        Vital Signs Last 24 Hrs  T(C): 36.7 (10 Wilson 2021 07:00), Max: 37.1 (09 Jan 2021 17:30)  T(F): 98 (10 Wilson 2021 07:00), Max: 98.8 (09 Jan 2021 17:30)  HR: 75 (10 Wilson 2021 11:22) (60 - 114)  BP: 115/58 (10 Wilson 2021 11:22) (90/95 - 163/63)  BP(mean): 68 (10 Wilson 2021 11:22) (68 - 94)  RR: 24 (10 Wilson 2021 11:22) (18 - 27)  SpO2: 96% (10 Wilson 2021 11:22) (94% - 98%)    I&O's Summary    09 Jan 2021 07:01  -  10 Wilson 2021 07:00  --------------------------------------------------------  IN: 755 mL / OUT: 2525 mL / NET: -1770 mL    10 Wilson 2021 07:01  -  10 Wilson 2021 14:04  --------------------------------------------------------  IN: 250 mL / OUT: 150 mL / NET: 100 mL        Physical Exam:   GENERAL: No acute distress, well-developed  HEAD:  Atraumatic, Normocephalic  EYES: EOMI, PERRLA, conjunctiva and sclera clear  NECK: Supple, no lymphadenopathy, no JVD  CHEST/LUNG: Decreased breath sounds, mild wheezes  HEART: Regular rate and rhythm. Normal S1/S2. No murmurs, rubs, or gallops  ABDOMEN: Soft, non-tender, non-distended; normal bowel sounds, no organomegaly  EXTREMITIES:  2+ peripheral pulses b/l, No clubbing, cyanosis, or edema  NEUROLOGY: A&O x 3, no focal deficits  SKIN: No rashes or lesions    LABS:                               11.8   13.13 )-----------( 170      ( 10 Wilson 2021 05:10 )             34.7       01-10    123<L>  |  91<L>  |  28<H>  ----------------------------<  172<H>  4.4   |  22  |  0.91    Ca    7.9<L>      10 Wilson 2021 05:10  Phos  3.3     01-10  Mg     2.0     01-09    TPro  6.4  /  Alb  2.9<L>  /  TBili  0.8  /  DBili  x   /  AST  43<H>  /  ALT  61<H>  /  AlkPhos  70  01-10      PT/INR - ( 10 Wilson 2021 05:10 )   PT: 13.9 sec;   INR: 1.17 ratio         PTT - ( 10 Wilson 2021 05:10 )  PTT:37.3 sec        ASSESSMENT & PLAN:   ====================ASSESSMENT ==============  65F w/ PMH of HTN, HLD, GERD, asthma admitted for complete heart block    3rd Degree Heart Block   B/L Pleural Effusions w/ mild pulmonary edema on CXR   Hyperkalemia  Hyponatremia  DMT2    Plan:  ====================== NEUROLOGY=====================  AOx3 today; AMS improved post TVP placement and improvement of hyponatremia   - Na 123 this morning  - Continue to monitor neuro status closely as per protocol   - Melatonin 5mg prn for sleep      melatonin 5 milliGRAM(s) Oral at bedtime PRN Sleep    ==================== RESPIRATORY======================  B/L Pleural Effusions w/ mild pulmonary edema on CXR   - SpO2 96% on RA  - Continue to monitor SpO2 via pulse oximetry    ====================CARDIOVASCULAR==================  3rd Degree Heart Block   - s/p TVP VVI at rate 60bpm for symptomatic bradycardia and hypotension   - s/p PPM by EP today. Vanco 1g in the AM prior to OR   - Monitor hemodynamics, monitor telemetry  - Monitor BMP    HTN  - C/w Amlodipine 5mg PO QD   - PO Hydralazine 25mg BID  - Monitor BP noninvasively     amLODIPine   Tablet 5 milliGRAM(s) Oral daily  hydrALAZINE 10 milliGRAM(s) Oral daily    ===================HEMATOLOGIC/ONC ===================  H/H stable  - Continue to monitor hemoglobin and hematocrit levels.     VTE prophylaxis   - Continue SQ Heparin for venous thromboembolism prophylaxis.     heparin   Injectable 5000 Unit(s) SubCutaneous every 8 hours    ===================== RENAL =========================  Hyponatremia, improving  - trending CMP  - Na 123 on repeat BMP in PM, received lasix 40 IVP per renal recs  - Fluid restriction 1L/day   - Renal following    Hyperkalemia, resolved  - K 4.4  - Trend CMP    ==================== GASTROINTESTINAL===================  Tolerating CC diet    =======================    ENDOCRINE  =====================  Hx of DMT2   - Glycemic control with Admelog sliding scale and Glucagon PRN.   - Monitor blood glucose levels.   - A1c 6.8 %     dextrose 40% Gel 15 Gram(s) Oral once  dextrose 50% Injectable 25 Gram(s) IV Push once  dextrose 50% Injectable 12.5 Gram(s) IV Push once  dextrose 50% Injectable 25 Gram(s) IV Push once  glucagon  Injectable 1 milliGRAM(s) IntraMuscular once  insulin lispro (ADMELOG) corrective regimen sliding scale   SubCutaneous three times a day before meals  insulin lispro (ADMELOG) corrective regimen sliding scale   SubCutaneous at bedtime    ========================INFECTIOUS DISEASE================  Afebrile, WBC elevated to 13.13  - Monitor for leukocytosis / fever.  - ordered vanco for perioperative antibiotic coverage        FOR FOLLOW UP:  [ ] Monitor on telemetry  [ ] BMP q12h for hyponatremia, f/u renal recs CCU Transfer Note    Transfer from: CCU    Transfer to: (  ) Medicine    ( x ) Telemetry    (  ) RCU  (  ) Palliative    (  ) Stroke Unit    (  ) MICU    (  ) __________________    Accepting Physician: Aaliyah    Signout given to:     HPI / CCU COURSE:  65y F w/ PMHx HTN, HLD, GERD, Asthma presenting as a transfer from [place] ED in setting of complete heart block. Patient was feeling weak and tired this morning and vomited x2 and was taken to the emergency department there was found to have complete heart block. Patient denies any chest pain, shortness of breath, fever or previous episodes. Patient was well until 3 days ago when she developed generalized weakness, malaise, and swelling of her hands, face and B/L LE. She has no known history of hypothyroidism. She was taking metoprolol until November for her hx of HTN, but daughter states her prescription ran out and was unable to refill. Daughter denies known sick contacts, recent travel. Ambulates without assistive device at baseline.    ED Course: Pt administered atropine 0.5mg  x 6 by EMS prior to OSH arrival. Pt arrived to [facility] w/ HR in 30s, Hypertensive and pacer pads placed on chest.  (08 Jan 2021 11:25)    Hospital Course:  1/8 Admitted to [facility] CICU for complete heart block. Severe hyponatremia, s/p 100ml 3% NS bolus,NA increased to 119.  K 5.6 Lokelma 10g dose x1 given.   1/9 TVP Placed  1/10 PPM placed        Vital Signs Last 24 Hrs  T(C): 36.7 (10 Wilson 2021 07:00), Max: 37.1 (09 Jan 2021 17:30)  T(F): 98 (10 Wilson 2021 07:00), Max: 98.8 (09 Jan 2021 17:30)  HR: 75 (10 Wilson 2021 11:22) (60 - 114)  BP: 115/58 (10 Wilson 2021 11:22) (90/95 - 163/63)  BP(mean): 68 (10 Wilson 2021 11:22) (68 - 94)  RR: 24 (10 Wilson 2021 11:22) (18 - 27)  SpO2: 96% (10 Wilson 2021 11:22) (94% - 98%)    I&O's Summary    09 Jan 2021 07:01  -  10 Wilson 2021 07:00  --------------------------------------------------------  IN: 755 mL / OUT: 2525 mL / NET: -1770 mL    10 Wilson 2021 07:01  -  10 Wilson 2021 14:04  --------------------------------------------------------  IN: 250 mL / OUT: 150 mL / NET: 100 mL        Physical Exam:   GENERAL: No acute distress, well-developed  HEAD:  Atraumatic, Normocephalic  EYES: EOMI, PERRLA, conjunctiva and sclera clear  NECK: Supple, no lymphadenopathy, no JVD  CHEST/LUNG: Decreased breath sounds, mild wheezes  HEART: Regular rate and rhythm. Normal S1/S2. No murmurs, rubs, or gallops  ABDOMEN: Soft, non-tender, non-distended; normal bowel sounds, no organomegaly  EXTREMITIES:  2+ peripheral pulses b/l, No clubbing, cyanosis, or edema  NEUROLOGY: A&O x 3, no focal deficits  SKIN: No rashes or lesions    LABS:                               11.8   13.13 )-----------( 170      ( 10 Wilson 2021 05:10 )             34.7       01-10    123<L>  |  91<L>  |  28<H>  ----------------------------<  172<H>  4.4   |  22  |  0.91    Ca    7.9<L>      10 Wilson 2021 05:10  Phos  3.3     01-10  Mg     2.0     01-09    TPro  6.4  /  Alb  2.9<L>  /  TBili  0.8  /  DBili  x   /  AST  43<H>  /  ALT  61<H>  /  AlkPhos  70  01-10      PT/INR - ( 10 Wilson 2021 05:10 )   PT: 13.9 sec;   INR: 1.17 ratio         PTT - ( 10 Wilson 2021 05:10 )  PTT:37.3 sec        ASSESSMENT & PLAN:   ====================ASSESSMENT ==============  65F w/ PMH of HTN, HLD, GERD, asthma admitted for complete heart block    3rd Degree Heart Block   B/L Pleural Effusions w/ mild pulmonary edema on CXR   Hyperkalemia  Hyponatremia  DMT2    Plan:  ====================== NEUROLOGY=====================  AOx3 today; AMS improved post TVP placement and improvement of hyponatremia   - Na 123 this morning  - Continue to monitor neuro status closely as per protocol   - Melatonin 5mg prn for sleep      melatonin 5 milliGRAM(s) Oral at bedtime PRN Sleep    ==================== RESPIRATORY======================  B/L Pleural Effusions w/ mild pulmonary edema on CXR   - SpO2 96% on RA  - Continue to monitor SpO2 via pulse oximetry    ====================CARDIOVASCULAR==================  3rd Degree Heart Block   - s/p TVP VVI at rate 60bpm for symptomatic bradycardia and hypotension   - s/p PPM by EP today. Vanco 1g in the AM prior to OR   - Monitor hemodynamics, monitor telemetry  - Monitor BMP    HTN  - C/w Amlodipine 5mg PO QD   - PO Hydralazine 25mg BID  - Monitor BP noninvasively     amLODIPine   Tablet 5 milliGRAM(s) Oral daily  hydrALAZINE 10 milliGRAM(s) Oral daily    ===================HEMATOLOGIC/ONC ===================  H/H stable  - Continue to monitor hemoglobin and hematocrit levels.     VTE prophylaxis   - Continue SQ Heparin for venous thromboembolism prophylaxis.     heparin   Injectable 5000 Unit(s) SubCutaneous every 8 hours    ===================== RENAL =========================  Hyponatremia, improving  - trending CMP  - Na 123 on repeat BMP in PM, received lasix 40 IVP per renal recs  - Fluid restriction 1L/day   - Renal following    Hyperkalemia, resolved  - K 4.4  - Trend CMP    ==================== GASTROINTESTINAL===================  Tolerating CC diet    =======================    ENDOCRINE  =====================  Hx of DMT2   - Glycemic control with Admelog sliding scale and Glucagon PRN.   - Monitor blood glucose levels.   - A1c 6.8 %     dextrose 40% Gel 15 Gram(s) Oral once  dextrose 50% Injectable 25 Gram(s) IV Push once  dextrose 50% Injectable 12.5 Gram(s) IV Push once  dextrose 50% Injectable 25 Gram(s) IV Push once  glucagon  Injectable 1 milliGRAM(s) IntraMuscular once  insulin lispro (ADMELOG) corrective regimen sliding scale   SubCutaneous three times a day before meals  insulin lispro (ADMELOG) corrective regimen sliding scale   SubCutaneous at bedtime    ========================INFECTIOUS DISEASE================  Afebrile, WBC elevated to 13.13  - Monitor for leukocytosis / fever.  - ordered vanco for perioperative antibiotic coverage        FOR FOLLOW UP:  [ ] Monitor on telemetry  [ ] BMP q12h for hyponatremia, f/u renal recs  [ ] Monitor BP for HTN; hydral increased to 25 BID today CCU Transfer Note    Transfer from: CCU    Transfer to: ( x ) Medicine [address]    Accepting Physician: Dr. Fischer    Signout given to: MAR and floor NP Aziza    HPI / CCU COURSE:  65y F w/ PMHx HTN, HLD, GERD, Asthma presenting as a transfer from [facility] in setting of complete heart block. Patient was feeling weak and tired this morning and vomited x2 and was taken to the emergency department there was found to have complete heart block. Patient denies any chest pain, shortness of breath, fever or previous episodes. Patient was well until 3 days ago when she developed generalized weakness, malaise, and swelling of her hands, face and B/L LE. She has no known history of hypothyroidism. She was taking metoprolol until November for her hx of HTN, but daughter states her prescription ran out and was unable to refill. Daughter denies known sick contacts, recent travel. Ambulates without assistive device at baseline.    ED Course: Pt administered atropine 0.5mg  x 6 by EMS prior to OSH arrival. Pt arrived to [facility] w/ HR in 30s, Hypertensive and pacer pads placed on chest.  (08 Jan 2021 11:25)    Hospital Course:  1/8 Admitted to [facility] CICU for complete heart block. Severe hyponatremia, s/p 100ml 3% NS bolus,NA increased to 119.  K 5.6 Lokelma 10g dose x1 given.   1/9 TVP Placed  1/10 PPM placed        Vital Signs Last 24 Hrs  T(C): 36.7 (10 Wilson 2021 07:00), Max: 37.1 (09 Jan 2021 17:30)  T(F): 98 (10 Wilson 2021 07:00), Max: 98.8 (09 Jan 2021 17:30)  HR: 75 (10 Wilson 2021 11:22) (60 - 114)  BP: 115/58 (10 Wilson 2021 11:22) (90/95 - 163/63)  BP(mean): 68 (10 Wislon 2021 11:22) (68 - 94)  RR: 24 (10 Wilson 2021 11:22) (18 - 27)  SpO2: 96% (10 Wilson 2021 11:22) (94% - 98%)    I&O's Summary    09 Jan 2021 07:01  -  10 Wilson 2021 07:00  --------------------------------------------------------  IN: 755 mL / OUT: 2525 mL / NET: -1770 mL    10 Wilson 2021 07:01  -  10 Wilson 2021 14:04  --------------------------------------------------------  IN: 250 mL / OUT: 150 mL / NET: 100 mL        Physical Exam:   GENERAL: No acute distress, well-developed  HEAD:  Atraumatic, Normocephalic  EYES: EOMI, PERRLA, conjunctiva and sclera clear  NECK: Supple, no lymphadenopathy, no JVD  CHEST/LUNG: Decreased breath sounds, mild wheezes  HEART: Regular rate and rhythm. Normal S1/S2. No murmurs, rubs, or gallops  ABDOMEN: Soft, non-tender, non-distended; normal bowel sounds, no organomegaly  EXTREMITIES:  2+ peripheral pulses b/l, No clubbing, cyanosis, or edema  NEUROLOGY: A&O x 3, no focal deficits  SKIN: No rashes or lesions    LABS:                               11.8   13.13 )-----------( 170      ( 10 Wilson 2021 05:10 )             34.7       01-10    123<L>  |  91<L>  |  28<H>  ----------------------------<  172<H>  4.4   |  22  |  0.91    Ca    7.9<L>      10 Wilson 2021 05:10  Phos  3.3     01-10  Mg     2.0     01-09    TPro  6.4  /  Alb  2.9<L>  /  TBili  0.8  /  DBili  x   /  AST  43<H>  /  ALT  61<H>  /  AlkPhos  70  01-10      PT/INR - ( 10 Wilson 2021 05:10 )   PT: 13.9 sec;   INR: 1.17 ratio         PTT - ( 10 Wilson 2021 05:10 )  PTT:37.3 sec        ASSESSMENT & PLAN:   ====================ASSESSMENT ==============  65F w/ PMH of HTN, HLD, GERD, asthma admitted for complete heart block    3rd Degree Heart Block   B/L Pleural Effusions w/ mild pulmonary edema on CXR   Hyperkalemia  Hyponatremia  DMT2    Plan:  ====================== NEUROLOGY=====================  AOx3 today; AMS improved post TVP placement and improvement of hyponatremia   - Na 123 this morning  - Continue to monitor neuro status closely as per protocol   - Melatonin 5mg prn for sleep      melatonin 5 milliGRAM(s) Oral at bedtime PRN Sleep    ==================== RESPIRATORY======================  B/L Pleural Effusions w/ mild pulmonary edema on CXR   - SpO2 96% on RA  - Continue to monitor SpO2 via pulse oximetry    ====================CARDIOVASCULAR==================  3rd Degree Heart Block   - s/p TVP VVI at rate 60bpm for symptomatic bradycardia and hypotension   - s/p PPM by EP today. Vanco 1g in the AM prior to OR   - Monitor hemodynamics, monitor telemetry  - Monitor BMP    HTN  - C/w Amlodipine 5mg PO QD   - PO Hydralazine 25mg BID  - Monitor BP noninvasively     amLODIPine   Tablet 5 milliGRAM(s) Oral daily  hydrALAZINE 10 milliGRAM(s) Oral daily    ===================HEMATOLOGIC/ONC ===================  H/H stable  - Continue to monitor hemoglobin and hematocrit levels.     VTE prophylaxis   - Continue SQ Heparin for venous thromboembolism prophylaxis.     heparin   Injectable 5000 Unit(s) SubCutaneous every 8 hours    ===================== RENAL =========================  Hyponatremia, improving  - trending CMP  - Na 123 on repeat BMP in PM, received lasix 40 IVP per renal recs  - Fluid restriction 1L/day   - Renal following    Hyperkalemia, resolved  - K 4.4  - Trend CMP    ==================== GASTROINTESTINAL===================  Tolerating CC diet    =======================    ENDOCRINE  =====================  Hx of DMT2   - Glycemic control with Admelog sliding scale and Glucagon PRN.   - Monitor blood glucose levels.   - A1c 6.8 %     dextrose 40% Gel 15 Gram(s) Oral once  dextrose 50% Injectable 25 Gram(s) IV Push once  dextrose 50% Injectable 12.5 Gram(s) IV Push once  dextrose 50% Injectable 25 Gram(s) IV Push once  glucagon  Injectable 1 milliGRAM(s) IntraMuscular once  insulin lispro (ADMELOG) corrective regimen sliding scale   SubCutaneous three times a day before meals  insulin lispro (ADMELOG) corrective regimen sliding scale   SubCutaneous at bedtime    ========================INFECTIOUS DISEASE================  Afebrile, WBC elevated to 13.13  - Monitor for leukocytosis / fever.  - ordered vanco for perioperative antibiotic coverage        FOR FOLLOW UP:  [ ] BMP q12h for hyponatremia, f/u renal recs  [ ] Monitor BP for HTN; hydral increased to 25 BID   [ ] PA and lateral CXR in the AM per EP

## 2021-01-10 NOTE — PROGRESS NOTE ADULT - SUBJECTIVE AND OBJECTIVE BOX
MATA CARRILLO  MRN-[id number]  Patient is a 65y old  Female who presents with a chief complaint of CHB (09 Jan 2021 22:06)    HPI:  65y F w/ PMHx HTN, HLD, GERD, Asthma presenting as a transfer from [place] [facility] in setting of complete heart block. Patient was feeling weak and tired this morning and vomited x2 and was taken to the emergency department there was found to have complete heart block. Patient denies any chest pain, shortness of breath, fever or previous episodes. Patient was well until 3 days ago when she developed generalized weakness, malaise, and swelling of her hands, face and B/L LE. She has no known history of hypothyroidism. She was taking metoprolol until November for her hx of HTN, but daughter states her prescription ran out and was unable to refill. Daughter denies known sick contacts, recent travel. Ambulates without assistive device at baseline.    ED Course: Pt administered atropine 0.5mg  x 6 by EMS prior to OSH arrival. Pt arrived to [facility] w/ HR in 30s, Hypertensive and pacer pads placed on chest.  (08 Jan 2021 11:25)      24 HOUR EVENTS:    REVIEW OF SYSTEMS:    CONSTITUTIONAL: No weakness, fevers or chills  EYES/ENT: No visual changes;  No vertigo or throat pain   NECK: No pain or stiffness  RESPIRATORY: No cough, wheezing, hemoptysis; No shortness of breath  CARDIOVASCULAR: No chest pain or palpitations  GASTROINTESTINAL: No abdominal or epigastric pain. No nausea, vomiting, or hematemesis; No diarrhea or constipation. No melena or hematochezia.  GENITOURINARY: No dysuria, frequency or hematuria  NEUROLOGICAL: No numbness or weakness  SKIN: No itching, rashes      ICU Vital Signs Last 24 Hrs  T(C): 37.1 (09 Jan 2021 17:30), Max: 37.1 (09 Jan 2021 17:30)  T(F): 98.8 (09 Jan 2021 17:30), Max: 98.8 (09 Jan 2021 17:30)  HR: 60 (10 Wilson 2021 07:00) (60 - 114)  BP: 112/73 (10 Wilson 2021 05:00) (90/95 - 163/63)  BP(mean): 88 (10 Wilson 2021 02:00) (68 - 89)  ABP: --  ABP(mean): --  RR: 21 (10 Wilson 2021 05:00) (18 - 25)  SpO2: 95% (10 Wilson 2021 05:00) (94% - 98%)      CVP(mm Hg): --  CO: --  CI: --  PA: --  PA(mean): --  PA(direct): --  PCWP: --  LA: --  RA: --  SVR: --  SVRI: --  PVR: --  PVRI: --  I&O's Summary    09 Jan 2021 07:01  -  10 Wilson 2021 07:00  --------------------------------------------------------  IN: 755 mL / OUT: 2525 mL / NET: -1770 mL        CAPILLARY BLOOD GLUCOSE    CAPILLARY BLOOD GLUCOSE      POCT Blood Glucose.: 197 mg/dL (09 Jan 2021 22:37)      PHYSICAL EXAM:  GENERAL: No acute distress, well-developed  HEAD:  Atraumatic, Normocephalic  EYES: EOMI, PERRLA, conjunctiva and sclera clear  NECK: Supple, no lymphadenopathy, no JVD  CHEST/LUNG: CTAB; No wheezes, rales, or rhonchi  HEART: Regular rate and rhythm. Normal S1/S2. No murmurs, rubs, or gallops  ABDOMEN: Soft, non-tender, non-distended; normal bowel sounds, no organomegaly  EXTREMITIES:  2+ peripheral pulses b/l, No clubbing, cyanosis, or edema  NEUROLOGY: A&O x 3, no focal deficits  SKIN: No rashes or lesions    ============================I/O===========================   I&O's Detail    09 Jan 2021 07:01  -  10 Wilson 2021 07:00  --------------------------------------------------------  IN:    Oral Fluid: 700 mL    sodium chloride 3%: 55 mL  Total IN: 755 mL    OUT:    Indwelling Catheter - Urethral (mL): 2525 mL  Total OUT: 2525 mL    Total NET: -1770 mL        ============================ LABS =========================                        11.8   13.13 )-----------( 170      ( 10 Wilson 2021 05:10 )             34.7     01-10    123<L>  |  91<L>  |  28<H>  ----------------------------<  172<H>  4.4   |  22  |  0.91    Ca    7.9<L>      10 Wilson 2021 05:10  Phos  3.3     01-10  Mg     2.0     01-09    TPro  6.4  /  Alb  2.9<L>  /  TBili  0.8  /  DBili  x   /  AST  43<H>  /  ALT  61<H>  /  AlkPhos  70  01-10    Troponin T, High Sensitivity Result: 9 ng/L (01-08-21 @ 10:02)              LIVER FUNCTIONS - ( 10 Wilson 2021 05:10 )  Alb: 2.9 g/dL / Pro: 6.4 g/dL / ALK PHOS: 70 U/L / ALT: 61 U/L / AST: 43 U/L / GGT: x           PT/INR - ( 10 Wilson 2021 05:10 )   PT: 13.9 sec;   INR: 1.17 ratio         PTT - ( 10 Wilson 2021 05:10 )  PTT:37.3 sec    Blood Gas Venous - Lactate: 3.8 mmoL/L (01-08-21 @ 13:20)  Lactate, Blood: 1.9 mmol/L (01-08-21 @ 11:44)  Blood Gas Venous - Lactate: 2.2 mmoL/L (01-08-21 @ 10:02)      ======================Micro/Rad/Cardio=================  Telemetry: Reviewed   EKG: Reviewed  CXR: Reviewed  Culture: Reviewed   Echo:   Cath:   ======================================================  PAST MEDICAL & SURGICAL HISTORY:  Asthma    GERD (gastroesophageal reflux disease)    HLD (hyperlipidemia)    HTN (hypertension)    Calculus of gallbladder without cholecystitis without obstruction    GERD (gastroesophageal reflux disease)    Asthma  Exacerbates in the winter    DM2 (diabetes mellitus, type 2)    HLD (hyperlipidemia)    HTN (hypertension)    No significant past surgical history    ====================ASSESSMENT ==============  3rd Degree Heart Block   B/L Pleural Effusions w/ mild pulmonary edema on CXR   Hyperkalemia  Hyponatremia  DMT2    Plan:  ====================== NEUROLOGY=====================  AM improved post TVP placement   - May be i/s/o hyponatremia, Na 121  - Continue to monitor neuro status closely as per protocol   -c/w 1:1 observation for safety   - Melatonin 5mg prn for sleep      melatonin 5 milliGRAM(s) Oral at bedtime PRN Sleep    ==================== RESPIRATORY======================  B/L Pleural Effusions w/ mild pulmonary edema on CXR   - SpO2 96% on RA  - Continue to monitor SpO2 via pulse oximetry    Respiratory acidosis with mild metabolic acidosis, in setting of pulmonary edema  - Hold bicarb for now d/t pulmonary edema and fluid overload     ====================CARDIOVASCULAR==================  3rd Degree Heart Block   - s/p TVP VVI at rate 60bpm for symptomatic bradycardia and hypotension   - PPM by EP tomorrow, NPO post midnight. Vanco 1g in the AM prior to OR   - Monitor hemodynamics, monitor telemetry  - Trend cardiac enzymes, lactate levels, BMPs    HTN  - C/w Amlodipine 5mg PO QD   - PO Hydralazine 10mg QD   - Monitor BP noninvasively     amLODIPine   Tablet 5 milliGRAM(s) Oral daily  hydrALAZINE 10 milliGRAM(s) Oral daily    ===================HEMATOLOGIC/ONC ===================  H/H 11/ 32.3   stable  - Continue to monitor hemoglobin and hematocrit levels.     VTE prophylaxis   - Continue SQ Heparin for venous thromboembolism prophylaxis.     heparin   Injectable 5000 Unit(s) SubCutaneous every 8 hours    ===================== RENAL =========================  Hyponatremia, Na 121.  - Restless and confused on arrival,  s/p 100cc 3%NS bolus  - trending CMP q4-6hrs  -s/p Lasix 40mg IVP, Na stable at 121   -goal Na  120-123, if Na >123 start D5w @75ml/hr and check CMP q2h   -Fluid restriction 1L/day   - Renal consulted    Hyperkalemia, K  4.0  - s/p Lokelma 10g x2   -s/p  lasix 40mg x1 IVP x1   - Trend CMP    ==================== GASTROINTESTINAL===================  NPO at midnight for PPM tomorrow     sodium chloride 3%. 100 milliLiter(s) (100 mL/Hr) IV Continuous <Continuous>    =======================    ENDOCRINE  =====================  Hx of DMT2   - Glycemic control with Admelog sliding scale and Glucagon PRN.   - Monitor blood glucose levels.   - A1c 6.8 %     dextrose 40% Gel 15 Gram(s) Oral once  dextrose 50% Injectable 25 Gram(s) IV Push once  dextrose 50% Injectable 12.5 Gram(s) IV Push once  dextrose 50% Injectable 25 Gram(s) IV Push once  glucagon  Injectable 1 milliGRAM(s) IntraMuscular once  insulin lispro (ADMELOG) corrective regimen sliding scale   SubCutaneous three times a day before meals  insulin lispro (ADMELOG) corrective regimen sliding scale   SubCutaneous at bedtime    ========================INFECTIOUS DISEASE================  Afebrile, WBC within normal limits.   - Monitor for leukocytosis / fever.  - ordered vanco for  perioperative antibiotic coverage MATA CARRILLO  MRN-[id number]  Patient is a 65y old  Female who presents with a chief complaint of CHB (09 Jan 2021 22:06)    HPI:  65y F w/ PMHx HTN, HLD, GERD, Asthma presenting as a transfer from [place] ED in setting of complete heart block. Patient was feeling weak and tired this morning and vomited x2 and was taken to the emergency department there was found to have complete heart block. Patient denies any chest pain, shortness of breath, fever or previous episodes. Patient was well until 3 days ago when she developed generalized weakness, malaise, and swelling of her hands, face and B/L LE. She has no known history of hypothyroidism. She was taking metoprolol until November for her hx of HTN, but daughter states her prescription ran out and was unable to refill. Daughter denies known sick contacts, recent travel. Ambulates without assistive device at baseline.    ED Course: Pt administered atropine 0.5mg  x 6 by EMS prior to OSH arrival. Pt arrived to [facility] w/ HR in 30s, Hypertensive and pacer pads placed on chest.  (08 Jan 2021 11:25)      24 HOUR EVENTS:  - Long pauses noted on telemetry, paced at 60 via TVP    REVIEW OF SYSTEMS:  CONSTITUTIONAL: No weakness, fevers or chills  EYES/ENT: No visual changes;  No vertigo or throat pain   NECK: No pain or stiffness  RESPIRATORY: No cough, wheezing, hemoptysis; No shortness of breath  CARDIOVASCULAR: No chest pain or palpitations  GASTROINTESTINAL: No abdominal or epigastric pain. No nausea, vomiting, or hematemesis; No diarrhea or constipation. No melena or hematochezia.  GENITOURINARY: No dysuria, frequency or hematuria  NEUROLOGICAL: No numbness or weakness  SKIN: No itching, rashes    ICU Vital Signs Last 24 Hrs  T(C): 37.1 (09 Jan 2021 17:30), Max: 37.1 (09 Jan 2021 17:30)  T(F): 98.8 (09 Jan 2021 17:30), Max: 98.8 (09 Jan 2021 17:30)  HR: 60 (10 Wilson 2021 07:00) (60 - 114)  BP: 112/73 (10 Wilson 2021 05:00) (90/95 - 163/63)  BP(mean): 88 (10 Wilson 2021 02:00) (68 - 89)  ABP: --  ABP(mean): --  RR: 21 (10 Wilson 2021 05:00) (18 - 25)  SpO2: 95% (10 Wilson 2021 05:00) (94% - 98%)      I&O's Summary    09 Jan 2021 07:01  -  10 Wilson 2021 07:00  --------------------------------------------------------  IN: 755 mL / OUT: 2525 mL / NET: -1770 mL        CAPILLARY BLOOD GLUCOSE    CAPILLARY BLOOD GLUCOSE      POCT Blood Glucose.: 197 mg/dL (09 Jan 2021 22:37)      PHYSICAL EXAM:  GENERAL: No acute distress, well-developed  HEAD:  Atraumatic, Normocephalic  EYES: EOMI, PERRLA, conjunctiva and sclera clear  NECK: Supple, no lymphadenopathy, no JVD  CHEST/LUNG: Decreased breath sounds, mild wheezes  HEART: Regular rate and rhythm. Normal S1/S2. No murmurs, rubs, or gallops  ABDOMEN: Soft, non-tender, non-distended; normal bowel sounds, no organomegaly  EXTREMITIES:  2+ peripheral pulses b/l, No clubbing, cyanosis, or edema  NEUROLOGY: A&O x 3, no focal deficits  SKIN: No rashes or lesions    ============================I/O===========================   I&O's Detail    09 Jan 2021 07:01  -  10 Wilson 2021 07:00  --------------------------------------------------------  IN:    Oral Fluid: 700 mL    sodium chloride 3%: 55 mL  Total IN: 755 mL    OUT:    Indwelling Catheter - Urethral (mL): 2525 mL  Total OUT: 2525 mL    Total NET: -1770 mL        ============================ LABS =========================                        11.8   13.13 )-----------( 170      ( 10 Wilson 2021 05:10 )             34.7     01-10    123<L>  |  91<L>  |  28<H>  ----------------------------<  172<H>  4.4   |  22  |  0.91    Ca    7.9<L>      10 Wilson 2021 05:10  Phos  3.3     01-10  Mg     2.0     01-09    TPro  6.4  /  Alb  2.9<L>  /  TBili  0.8  /  DBili  x   /  AST  43<H>  /  ALT  61<H>  /  AlkPhos  70  01-10    Troponin T, High Sensitivity Result: 9 ng/L (01-08-21 @ 10:02)              LIVER FUNCTIONS - ( 10 Wilson 2021 05:10 )  Alb: 2.9 g/dL / Pro: 6.4 g/dL / ALK PHOS: 70 U/L / ALT: 61 U/L / AST: 43 U/L / GGT: x           PT/INR - ( 10 Wilson 2021 05:10 )   PT: 13.9 sec;   INR: 1.17 ratio         PTT - ( 10 Wilson 2021 05:10 )  PTT:37.3 sec    Blood Gas Venous - Lactate: 3.8 mmoL/L (01-08-21 @ 13:20)  Lactate, Blood: 1.9 mmol/L (01-08-21 @ 11:44)  Blood Gas Venous - Lactate: 2.2 mmoL/L (01-08-21 @ 10:02)      ======================Micro/Rad/Cardio=================  Telemetry: Reviewed   EKG: Reviewed  CXR: Reviewed  Culture: Reviewed   Echo:   Cath:   ======================================================  PAST MEDICAL & SURGICAL HISTORY:  Asthma    GERD (gastroesophageal reflux disease)    HLD (hyperlipidemia)    HTN (hypertension)    Calculus of gallbladder without cholecystitis without obstruction    GERD (gastroesophageal reflux disease)    Asthma  Exacerbates in the winter    DM2 (diabetes mellitus, type 2)    HLD (hyperlipidemia)    HTN (hypertension)    No significant past surgical history    ====================ASSESSMENT ==============  65F w/ PMH of HTN, HLD, GERD, asthma admitted for complete heart block    3rd Degree Heart Block   B/L Pleural Effusions w/ mild pulmonary edema on CXR   Hyperkalemia  Hyponatremia  DMT2    Plan:  ====================== NEUROLOGY=====================  AOx3 today; AMS improved post TVP placement and improvement of hyponatremia   - Na 123 this morning  - Continue to monitor neuro status closely as per protocol   - c/w 1:1 observation for safety   - Melatonin 5mg prn for sleep      melatonin 5 milliGRAM(s) Oral at bedtime PRN Sleep    ==================== RESPIRATORY======================  B/L Pleural Effusions w/ mild pulmonary edema on CXR   - SpO2 96% on RA  - Continue to monitor SpO2 via pulse oximetry    ====================CARDIOVASCULAR==================  3rd Degree Heart Block   - s/p TVP VVI at rate 60bpm for symptomatic bradycardia and hypotension   - PPM by EP today. Vanco 1g in the AM prior to OR   - Monitor hemodynamics, monitor telemetry  - Trend cardiac enzymes, lactate levels, BMPs    HTN  - C/w Amlodipine 5mg PO QD   - PO Hydralazine 10mg QD   - Monitor BP noninvasively     amLODIPine   Tablet 5 milliGRAM(s) Oral daily  hydrALAZINE 10 milliGRAM(s) Oral daily    ===================HEMATOLOGIC/ONC ===================  H/H 11/ 32.3   stable  - Continue to monitor hemoglobin and hematocrit levels.     VTE prophylaxis   - Continue SQ Heparin for venous thromboembolism prophylaxis.     heparin   Injectable 5000 Unit(s) SubCutaneous every 8 hours    ===================== RENAL =========================  Hyponatremia, Na 121.  - Restless and confused on arrival,  s/p 100cc 3%NS bolus  - trending CMP q4-6hrs  -s/p Lasix 40mg IVP, Na stable at 121   -goal Na  120-123, if Na >123 start D5w @75ml/hr and check CMP q2h   -Fluid restriction 1L/day   - Renal consulted    Hyperkalemia, K  4.0  - s/p Lokelma 10g x2   -s/p  lasix 40mg x1 IVP x1   - Trend CMP    ==================== GASTROINTESTINAL===================  NPO at midnight for PPM tomorrow     sodium chloride 3%. 100 milliLiter(s) (100 mL/Hr) IV Continuous <Continuous>    =======================    ENDOCRINE  =====================  Hx of DMT2   - Glycemic control with Admelog sliding scale and Glucagon PRN.   - Monitor blood glucose levels.   - A1c 6.8 %     dextrose 40% Gel 15 Gram(s) Oral once  dextrose 50% Injectable 25 Gram(s) IV Push once  dextrose 50% Injectable 12.5 Gram(s) IV Push once  dextrose 50% Injectable 25 Gram(s) IV Push once  glucagon  Injectable 1 milliGRAM(s) IntraMuscular once  insulin lispro (ADMELOG) corrective regimen sliding scale   SubCutaneous three times a day before meals  insulin lispro (ADMELOG) corrective regimen sliding scale   SubCutaneous at bedtime    ========================INFECTIOUS DISEASE================  Afebrile, WBC within normal limits.   - Monitor for leukocytosis / fever.  - ordered vanco for  perioperative antibiotic coverage MATA CARRILLO  MRN-[id number]  Patient is a 65y old  Female who presents with a chief complaint of CHB (09 Jan 2021 22:06)    HPI:  65y F w/ PMHx HTN, HLD, GERD, Asthma presenting as a transfer from [place] ED in setting of complete heart block. Patient was feeling weak and tired this morning and vomited x2 and was taken to the emergency department there was found to have complete heart block. Patient denies any chest pain, shortness of breath, fever or previous episodes. Patient was well until 3 days ago when she developed generalized weakness, malaise, and swelling of her hands, face and B/L LE. She has no known history of hypothyroidism. She was taking metoprolol until November for her hx of HTN, but daughter states her prescription ran out and was unable to refill. Daughter denies known sick contacts, recent travel. Ambulates without assistive device at baseline.    ED Course: Pt administered atropine 0.5mg  x 6 by EMS prior to OSH arrival. Pt arrived to [facility] w/ HR in 30s, Hypertensive and pacer pads placed on chest.  (08 Jan 2021 11:25)      24 HOUR EVENTS:  - Long pauses noted on telemetry, paced at 60 via TVP    REVIEW OF SYSTEMS:  CONSTITUTIONAL: No weakness, fevers or chills  EYES/ENT: No visual changes;  No vertigo or throat pain   NECK: No pain or stiffness  RESPIRATORY: No cough, wheezing, hemoptysis; No shortness of breath  CARDIOVASCULAR: No chest pain or palpitations  GASTROINTESTINAL: No abdominal or epigastric pain. No nausea, vomiting, or hematemesis; No diarrhea or constipation. No melena or hematochezia.  GENITOURINARY: No dysuria, frequency or hematuria  NEUROLOGICAL: No numbness or weakness  SKIN: No itching, rashes    ICU Vital Signs Last 24 Hrs  T(C): 37.1 (09 Jan 2021 17:30), Max: 37.1 (09 Jan 2021 17:30)  T(F): 98.8 (09 Jan 2021 17:30), Max: 98.8 (09 Jan 2021 17:30)  HR: 60 (10 Wilson 2021 07:00) (60 - 114)  BP: 112/73 (10 Wilson 2021 05:00) (90/95 - 163/63)  BP(mean): 88 (10 Wilson 2021 02:00) (68 - 89)  ABP: --  ABP(mean): --  RR: 21 (10 Wilson 2021 05:00) (18 - 25)  SpO2: 95% (10 Wilson 2021 05:00) (94% - 98%)      I&O's Summary    09 Jan 2021 07:01  -  10 Wilson 2021 07:00  --------------------------------------------------------  IN: 755 mL / OUT: 2525 mL / NET: -1770 mL        CAPILLARY BLOOD GLUCOSE    CAPILLARY BLOOD GLUCOSE      POCT Blood Glucose.: 197 mg/dL (09 Jan 2021 22:37)      PHYSICAL EXAM:  GENERAL: No acute distress, well-developed  HEAD:  Atraumatic, Normocephalic  EYES: EOMI, PERRLA, conjunctiva and sclera clear  NECK: Supple, no lymphadenopathy, no JVD  CHEST/LUNG: Decreased breath sounds, mild wheezes  HEART: Regular rate and rhythm. Normal S1/S2. No murmurs, rubs, or gallops  ABDOMEN: Soft, non-tender, non-distended; normal bowel sounds, no organomegaly  EXTREMITIES:  2+ peripheral pulses b/l, No clubbing, cyanosis, or edema  NEUROLOGY: A&O x 3, no focal deficits  SKIN: No rashes or lesions    ============================I/O===========================   I&O's Detail    09 Jan 2021 07:01  -  10 Wilson 2021 07:00  --------------------------------------------------------  IN:    Oral Fluid: 700 mL    sodium chloride 3%: 55 mL  Total IN: 755 mL    OUT:    Indwelling Catheter - Urethral (mL): 2525 mL  Total OUT: 2525 mL    Total NET: -1770 mL        ============================ LABS =========================                        11.8   13.13 )-----------( 170      ( 10 Wilson 2021 05:10 )             34.7     01-10    123<L>  |  91<L>  |  28<H>  ----------------------------<  172<H>  4.4   |  22  |  0.91    Ca    7.9<L>      10 Wilson 2021 05:10  Phos  3.3     01-10  Mg     2.0     01-09    TPro  6.4  /  Alb  2.9<L>  /  TBili  0.8  /  DBili  x   /  AST  43<H>  /  ALT  61<H>  /  AlkPhos  70  01-10    Troponin T, High Sensitivity Result: 9 ng/L (01-08-21 @ 10:02)              LIVER FUNCTIONS - ( 10 Wilson 2021 05:10 )  Alb: 2.9 g/dL / Pro: 6.4 g/dL / ALK PHOS: 70 U/L / ALT: 61 U/L / AST: 43 U/L / GGT: x           PT/INR - ( 10 Wilson 2021 05:10 )   PT: 13.9 sec;   INR: 1.17 ratio         PTT - ( 10 Wilson 2021 05:10 )  PTT:37.3 sec    Blood Gas Venous - Lactate: 3.8 mmoL/L (01-08-21 @ 13:20)  Lactate, Blood: 1.9 mmol/L (01-08-21 @ 11:44)  Blood Gas Venous - Lactate: 2.2 mmoL/L (01-08-21 @ 10:02)      ======================Micro/Rad/Cardio=================  Telemetry: Reviewed   EKG: Reviewed  CXR: Reviewed  Culture: Reviewed   Echo:   Cath:   ======================================================  PAST MEDICAL & SURGICAL HISTORY:  Asthma    GERD (gastroesophageal reflux disease)    HLD (hyperlipidemia)    HTN (hypertension)    Calculus of gallbladder without cholecystitis without obstruction    GERD (gastroesophageal reflux disease)    Asthma  Exacerbates in the winter    DM2 (diabetes mellitus, type 2)    HLD (hyperlipidemia)    HTN (hypertension)    No significant past surgical history    ====================ASSESSMENT ==============  65F w/ PMH of HTN, HLD, GERD, asthma admitted for complete heart block    3rd Degree Heart Block   B/L Pleural Effusions w/ mild pulmonary edema on CXR   Hyperkalemia  Hyponatremia  DMT2    Plan:  ====================== NEUROLOGY=====================  AOx3 today; AMS improved post TVP placement and improvement of hyponatremia   - Na 123 this morning  - Continue to monitor neuro status closely as per protocol   - Melatonin 5mg prn for sleep      melatonin 5 milliGRAM(s) Oral at bedtime PRN Sleep    ==================== RESPIRATORY======================  B/L Pleural Effusions w/ mild pulmonary edema on CXR   - SpO2 96% on RA  - Continue to monitor SpO2 via pulse oximetry    ====================CARDIOVASCULAR==================  3rd Degree Heart Block   - s/p TVP VVI at rate 60bpm for symptomatic bradycardia and hypotension   - PPM by EP today. Vanco 1g in the AM prior to OR   - Monitor hemodynamics, monitor telemetry  - Monitor BMP    HTN  - C/w Amlodipine 5mg PO QD   - PO Hydralazine 10mg BID  - Monitor BP noninvasively     amLODIPine   Tablet 5 milliGRAM(s) Oral daily  hydrALAZINE 10 milliGRAM(s) Oral daily    ===================HEMATOLOGIC/ONC ===================  H/H stable  - Continue to monitor hemoglobin and hematocrit levels.     VTE prophylaxis   - Continue SQ Heparin for venous thromboembolism prophylaxis.     heparin   Injectable 5000 Unit(s) SubCutaneous every 8 hours    ===================== RENAL =========================  Hyponatremia, improving  - Na 123  - trending CMP q4-6hrs  - If Na <=123 on repeat today, repeat Lasix IV 40  - Fluid restriction 1L/day   - Renal following    Hyperkalemia, resolved  - K 4.4  - Trend CMP    ==================== GASTROINTESTINAL===================  Resume diet after PPM today      =======================    ENDOCRINE  =====================  Hx of DMT2   - Glycemic control with Admelog sliding scale and Glucagon PRN.   - Monitor blood glucose levels.   - A1c 6.8 %     dextrose 40% Gel 15 Gram(s) Oral once  dextrose 50% Injectable 25 Gram(s) IV Push once  dextrose 50% Injectable 12.5 Gram(s) IV Push once  dextrose 50% Injectable 25 Gram(s) IV Push once  glucagon  Injectable 1 milliGRAM(s) IntraMuscular once  insulin lispro (ADMELOG) corrective regimen sliding scale   SubCutaneous three times a day before meals  insulin lispro (ADMELOG) corrective regimen sliding scale   SubCutaneous at bedtime    ========================INFECTIOUS DISEASE================  Afebrile, WBC elevated to 13.13  - Monitor for leukocytosis / fever.  - ordered vanco for perioperative antibiotic coverage

## 2021-01-11 ENCOUNTER — APPOINTMENT (OUTPATIENT)
Dept: RHEUMATOLOGY | Facility: CLINIC | Age: 66
End: 2021-01-11

## 2021-01-11 ENCOUNTER — TRANSCRIPTION ENCOUNTER (OUTPATIENT)
Age: 66
End: 2021-01-11

## 2021-01-11 VITALS
DIASTOLIC BLOOD PRESSURE: 86 MMHG | HEART RATE: 109 BPM | OXYGEN SATURATION: 94 % | RESPIRATION RATE: 18 BRPM | TEMPERATURE: 98 F | SYSTOLIC BLOOD PRESSURE: 156 MMHG

## 2021-01-11 PROBLEM — I10 ESSENTIAL (PRIMARY) HYPERTENSION: Chronic | Status: ACTIVE | Noted: 2021-01-08

## 2021-01-11 PROBLEM — K21.9 GASTRO-ESOPHAGEAL REFLUX DISEASE WITHOUT ESOPHAGITIS: Chronic | Status: ACTIVE | Noted: 2021-01-08

## 2021-01-11 PROBLEM — J45.909 UNSPECIFIED ASTHMA, UNCOMPLICATED: Chronic | Status: ACTIVE | Noted: 2021-01-08

## 2021-01-11 PROBLEM — E78.5 HYPERLIPIDEMIA, UNSPECIFIED: Chronic | Status: ACTIVE | Noted: 2021-01-08

## 2021-01-11 LAB
ALBUMIN SERPL ELPH-MCNC: 3.3 G/DL — SIGNIFICANT CHANGE UP (ref 3.3–5)
ALP SERPL-CCNC: 68 U/L — SIGNIFICANT CHANGE UP (ref 40–120)
ALT FLD-CCNC: 42 U/L — SIGNIFICANT CHANGE UP (ref 10–45)
ANION GAP SERPL CALC-SCNC: 8 MMOL/L — SIGNIFICANT CHANGE UP (ref 5–17)
ANION GAP SERPL CALC-SCNC: 9 MMOL/L — SIGNIFICANT CHANGE UP (ref 5–17)
AST SERPL-CCNC: 27 U/L — SIGNIFICANT CHANGE UP (ref 10–40)
BASOPHILS # BLD AUTO: 0.06 K/UL — SIGNIFICANT CHANGE UP (ref 0–0.2)
BASOPHILS NFR BLD AUTO: 0.6 % — SIGNIFICANT CHANGE UP (ref 0–2)
BILIRUB SERPL-MCNC: 0.9 MG/DL — SIGNIFICANT CHANGE UP (ref 0.2–1.2)
BUN SERPL-MCNC: 15 MG/DL — SIGNIFICANT CHANGE UP (ref 7–23)
BUN SERPL-MCNC: 17 MG/DL — SIGNIFICANT CHANGE UP (ref 7–23)
CALCIUM SERPL-MCNC: 8.6 MG/DL — SIGNIFICANT CHANGE UP (ref 8.4–10.5)
CALCIUM SERPL-MCNC: 8.7 MG/DL — SIGNIFICANT CHANGE UP (ref 8.4–10.5)
CHLORIDE SERPL-SCNC: 97 MMOL/L — SIGNIFICANT CHANGE UP (ref 96–108)
CHLORIDE SERPL-SCNC: 97 MMOL/L — SIGNIFICANT CHANGE UP (ref 96–108)
CO2 SERPL-SCNC: 27 MMOL/L — SIGNIFICANT CHANGE UP (ref 22–31)
CO2 SERPL-SCNC: 29 MMOL/L — SIGNIFICANT CHANGE UP (ref 22–31)
CORTIS AM PEAK SERPL-MCNC: 13.3 UG/DL — SIGNIFICANT CHANGE UP (ref 6–18.4)
CREAT SERPL-MCNC: 0.71 MG/DL — SIGNIFICANT CHANGE UP (ref 0.5–1.3)
CREAT SERPL-MCNC: 0.72 MG/DL — SIGNIFICANT CHANGE UP (ref 0.5–1.3)
EOSINOPHIL # BLD AUTO: 0.14 K/UL — SIGNIFICANT CHANGE UP (ref 0–0.5)
EOSINOPHIL NFR BLD AUTO: 1.4 % — SIGNIFICANT CHANGE UP (ref 0–6)
GLUCOSE BLDC GLUCOMTR-MCNC: 137 MG/DL — HIGH (ref 70–99)
GLUCOSE BLDC GLUCOMTR-MCNC: 228 MG/DL — HIGH (ref 70–99)
GLUCOSE SERPL-MCNC: 160 MG/DL — HIGH (ref 70–99)
GLUCOSE SERPL-MCNC: 183 MG/DL — HIGH (ref 70–99)
HCT VFR BLD CALC: 34.9 % — SIGNIFICANT CHANGE UP (ref 34.5–45)
HGB BLD-MCNC: 11.9 G/DL — SIGNIFICANT CHANGE UP (ref 11.5–15.5)
IMM GRANULOCYTES NFR BLD AUTO: 0.5 % — SIGNIFICANT CHANGE UP (ref 0–1.5)
LYMPHOCYTES # BLD AUTO: 1.51 K/UL — SIGNIFICANT CHANGE UP (ref 1–3.3)
LYMPHOCYTES # BLD AUTO: 15.3 % — SIGNIFICANT CHANGE UP (ref 13–44)
MAGNESIUM SERPL-MCNC: 1.9 MG/DL — SIGNIFICANT CHANGE UP (ref 1.6–2.6)
MCHC RBC-ENTMCNC: 27.7 PG — SIGNIFICANT CHANGE UP (ref 27–34)
MCHC RBC-ENTMCNC: 34.1 GM/DL — SIGNIFICANT CHANGE UP (ref 32–36)
MCV RBC AUTO: 81.2 FL — SIGNIFICANT CHANGE UP (ref 80–100)
MONOCYTES # BLD AUTO: 0.75 K/UL — SIGNIFICANT CHANGE UP (ref 0–0.9)
MONOCYTES NFR BLD AUTO: 7.6 % — SIGNIFICANT CHANGE UP (ref 2–14)
NEUTROPHILS # BLD AUTO: 7.38 K/UL — SIGNIFICANT CHANGE UP (ref 1.8–7.4)
NEUTROPHILS NFR BLD AUTO: 74.6 % — SIGNIFICANT CHANGE UP (ref 43–77)
NRBC # BLD: 0 /100 WBCS — SIGNIFICANT CHANGE UP (ref 0–0)
PHOSPHATE SERPL-MCNC: 2.4 MG/DL — LOW (ref 2.5–4.5)
PLATELET # BLD AUTO: 172 K/UL — SIGNIFICANT CHANGE UP (ref 150–400)
POTASSIUM SERPL-MCNC: 3.7 MMOL/L — SIGNIFICANT CHANGE UP (ref 3.5–5.3)
POTASSIUM SERPL-MCNC: 3.8 MMOL/L — SIGNIFICANT CHANGE UP (ref 3.5–5.3)
POTASSIUM SERPL-SCNC: 3.7 MMOL/L — SIGNIFICANT CHANGE UP (ref 3.5–5.3)
POTASSIUM SERPL-SCNC: 3.8 MMOL/L — SIGNIFICANT CHANGE UP (ref 3.5–5.3)
PROT SERPL-MCNC: 6.2 G/DL — SIGNIFICANT CHANGE UP (ref 6–8.3)
RBC # BLD: 4.3 M/UL — SIGNIFICANT CHANGE UP (ref 3.8–5.2)
RBC # FLD: 12.8 % — SIGNIFICANT CHANGE UP (ref 10.3–14.5)
SARS-COV-2 IGG SERPL IA-ACNC: 0.4 RATIO — SIGNIFICANT CHANGE UP
SARS-COV-2 IGG SERPL QL IA: NEGATIVE — SIGNIFICANT CHANGE UP
SARS-COV-2 IGM SERPL IA-ACNC: 0.26 RATIO — SIGNIFICANT CHANGE UP
SARS-COV-2 IGM SERPL IA-ACNC: 0.26 RATIO — SIGNIFICANT CHANGE UP
SODIUM SERPL-SCNC: 133 MMOL/L — LOW (ref 135–145)
SODIUM SERPL-SCNC: 134 MMOL/L — LOW (ref 135–145)
WBC # BLD: 9.89 K/UL — SIGNIFICANT CHANGE UP (ref 3.8–10.5)
WBC # FLD AUTO: 9.89 K/UL — SIGNIFICANT CHANGE UP (ref 3.8–10.5)

## 2021-01-11 PROCEDURE — 82570 ASSAY OF URINE CREATININE: CPT

## 2021-01-11 PROCEDURE — 84295 ASSAY OF SERUM SODIUM: CPT

## 2021-01-11 PROCEDURE — 80053 COMPREHEN METABOLIC PANEL: CPT

## 2021-01-11 PROCEDURE — C1785: CPT

## 2021-01-11 PROCEDURE — 84100 ASSAY OF PHOSPHORUS: CPT

## 2021-01-11 PROCEDURE — 86850 RBC ANTIBODY SCREEN: CPT

## 2021-01-11 PROCEDURE — 99233 SBSQ HOSP IP/OBS HIGH 50: CPT | Mod: GC

## 2021-01-11 PROCEDURE — 80061 LIPID PANEL: CPT

## 2021-01-11 PROCEDURE — 86803 HEPATITIS C AB TEST: CPT

## 2021-01-11 PROCEDURE — 80048 BASIC METABOLIC PNL TOTAL CA: CPT

## 2021-01-11 PROCEDURE — 82803 BLOOD GASES ANY COMBINATION: CPT

## 2021-01-11 PROCEDURE — 93010 ELECTROCARDIOGRAM REPORT: CPT

## 2021-01-11 PROCEDURE — 71045 X-RAY EXAM CHEST 1 VIEW: CPT

## 2021-01-11 PROCEDURE — 71046 X-RAY EXAM CHEST 2 VIEWS: CPT | Mod: 26

## 2021-01-11 PROCEDURE — 85014 HEMATOCRIT: CPT

## 2021-01-11 PROCEDURE — 99239 HOSP IP/OBS DSCHRG MGMT >30: CPT

## 2021-01-11 PROCEDURE — 82435 ASSAY OF BLOOD CHLORIDE: CPT

## 2021-01-11 PROCEDURE — 83036 HEMOGLOBIN GLYCOSYLATED A1C: CPT

## 2021-01-11 PROCEDURE — 83735 ASSAY OF MAGNESIUM: CPT

## 2021-01-11 PROCEDURE — 84443 ASSAY THYROID STIM HORMONE: CPT

## 2021-01-11 PROCEDURE — 82947 ASSAY GLUCOSE BLOOD QUANT: CPT

## 2021-01-11 PROCEDURE — 86901 BLOOD TYPING SEROLOGIC RH(D): CPT

## 2021-01-11 PROCEDURE — U0005: CPT

## 2021-01-11 PROCEDURE — 84484 ASSAY OF TROPONIN QUANT: CPT

## 2021-01-11 PROCEDURE — 33208 INSRT HEART PM ATRIAL & VENT: CPT | Mod: KX

## 2021-01-11 PROCEDURE — 84132 ASSAY OF SERUM POTASSIUM: CPT

## 2021-01-11 PROCEDURE — 85610 PROTHROMBIN TIME: CPT

## 2021-01-11 PROCEDURE — 83880 ASSAY OF NATRIURETIC PEPTIDE: CPT

## 2021-01-11 PROCEDURE — C1898: CPT

## 2021-01-11 PROCEDURE — 85027 COMPLETE CBC AUTOMATED: CPT

## 2021-01-11 PROCEDURE — 99291 CRITICAL CARE FIRST HOUR: CPT

## 2021-01-11 PROCEDURE — 83930 ASSAY OF BLOOD OSMOLALITY: CPT

## 2021-01-11 PROCEDURE — C8929: CPT

## 2021-01-11 PROCEDURE — 93005 ELECTROCARDIOGRAM TRACING: CPT

## 2021-01-11 PROCEDURE — 87635 SARS-COV-2 COVID-19 AMP PRB: CPT

## 2021-01-11 PROCEDURE — 93010 ELECTROCARDIOGRAM REPORT: CPT | Mod: 77

## 2021-01-11 PROCEDURE — 84560 ASSAY OF URINE/URIC ACID: CPT

## 2021-01-11 PROCEDURE — 83605 ASSAY OF LACTIC ACID: CPT

## 2021-01-11 PROCEDURE — 86900 BLOOD TYPING SEROLOGIC ABO: CPT

## 2021-01-11 PROCEDURE — 85730 THROMBOPLASTIN TIME PARTIAL: CPT

## 2021-01-11 PROCEDURE — C1769: CPT

## 2021-01-11 PROCEDURE — 86769 SARS-COV-2 COVID-19 ANTIBODY: CPT

## 2021-01-11 PROCEDURE — 71046 X-RAY EXAM CHEST 2 VIEWS: CPT

## 2021-01-11 PROCEDURE — 85018 HEMOGLOBIN: CPT

## 2021-01-11 PROCEDURE — 85025 COMPLETE CBC W/AUTO DIFF WBC: CPT

## 2021-01-11 PROCEDURE — 82962 GLUCOSE BLOOD TEST: CPT

## 2021-01-11 PROCEDURE — 84300 ASSAY OF URINE SODIUM: CPT

## 2021-01-11 PROCEDURE — C1887: CPT

## 2021-01-11 PROCEDURE — 86618 LYME DISEASE ANTIBODY: CPT

## 2021-01-11 PROCEDURE — C1889: CPT

## 2021-01-11 PROCEDURE — 82533 TOTAL CORTISOL: CPT

## 2021-01-11 PROCEDURE — 82330 ASSAY OF CALCIUM: CPT

## 2021-01-11 PROCEDURE — 83935 ASSAY OF URINE OSMOLALITY: CPT

## 2021-01-11 PROCEDURE — 70450 CT HEAD/BRAIN W/O DYE: CPT

## 2021-01-11 RX ORDER — METOPROLOL TARTRATE 50 MG
1 TABLET ORAL
Qty: 0 | Refills: 0 | DISCHARGE

## 2021-01-11 RX ORDER — ATORVASTATIN CALCIUM 80 MG/1
1 TABLET, FILM COATED ORAL
Qty: 0 | Refills: 0 | DISCHARGE

## 2021-01-11 RX ORDER — ACETAMINOPHEN 500 MG
2 TABLET ORAL
Qty: 0 | Refills: 0 | DISCHARGE
Start: 2021-01-11

## 2021-01-11 RX ORDER — ALBUTEROL 90 UG/1
1 AEROSOL, METERED ORAL
Qty: 0 | Refills: 0 | DISCHARGE

## 2021-01-11 RX ORDER — AMLODIPINE BESYLATE 2.5 MG/1
1 TABLET ORAL
Qty: 0 | Refills: 0 | DISCHARGE

## 2021-01-11 RX ORDER — AMLODIPINE BESYLATE 2.5 MG/1
1 TABLET ORAL
Qty: 0 | Refills: 0 | DISCHARGE
Start: 2021-01-11

## 2021-01-11 RX ORDER — FAMOTIDINE 10 MG/ML
1 INJECTION INTRAVENOUS
Qty: 0 | Refills: 0 | DISCHARGE

## 2021-01-11 RX ORDER — ACETAMINOPHEN 500 MG
650 TABLET ORAL EVERY 6 HOURS
Refills: 0 | Status: DISCONTINUED | OUTPATIENT
Start: 2021-01-11 | End: 2021-01-11

## 2021-01-11 RX ADMIN — Medication 650 MILLIGRAM(S): at 05:36

## 2021-01-11 RX ADMIN — Medication 25 MILLIGRAM(S): at 05:37

## 2021-01-11 RX ADMIN — AMLODIPINE BESYLATE 5 MILLIGRAM(S): 2.5 TABLET ORAL at 05:37

## 2021-01-11 RX ADMIN — Medication 2: at 09:23

## 2021-01-11 RX ADMIN — Medication 100 MILLIGRAM(S): at 07:18

## 2021-01-11 RX ADMIN — HEPARIN SODIUM 5000 UNIT(S): 5000 INJECTION INTRAVENOUS; SUBCUTANEOUS at 05:36

## 2021-01-11 NOTE — CHART NOTE - NSCHARTNOTEFT_GEN_A_CORE
patient seen and examined. no acute complaints. mild pain at ppm site    PE:   no hematoma at PPM site - incision with staples - c/d/i  lungs cta b/l  no LE edema    65F h/o HTN, HLD, GERD, Asthma presented as a transfer from [place] ED in setting of complete heart block. Patient was feeling generalized weakness/malaise x 3 days and vomited x2 on day of admission and was taken to the emergency department there was found to have complete heart block. Patient denied any chest pain, shortness of breath, fever. TSH wnl. patient was given atropine 0.5mg  x 6 by EMS prior to OSH arrival. Pt arrived to [facility] w/ HR in 30s. patient was admited to CCU s/p TVP 1/9 and PPM placement on 1/10. patient found to have severe hyponatremia given hypertonic saline with improvement in Na. hyperkalemia s/p lokelma with resolution. patient to be restarted on home medications including toprol. cxr post ppm placement without pneumo and appropriate placement of leads. EKG paced rhythm at 108bpm. patient will be discharge to follow up with pmd and EP as outpatient.     discharge time - 36 minutes  Dr. M. Luke  Medicine Hospitalist  [phone]

## 2021-01-11 NOTE — DISCHARGE NOTE NURSING/CASE MANAGEMENT/SOCIAL WORK - PATIENT PORTAL LINK FT
You can access the FollowMyHealth Patient Portal offered by [facility] by registering at the following website: [email or web address]. By joining [facility]’s FollowMyHealth portal, you will also be able to view your health information using other applications (apps) compatible with our system.

## 2021-01-11 NOTE — CHART NOTE - NSCHARTNOTEFT_GEN_A_CORE
MAR Accept Note  Transfer to: Telemetry    Accepting Attending Physician:  Dr. Fischer   Assigned Room:  2MON 229W    Patient seen and examined.   Labs and data reviewed.   No findings precluding transfer of service.       HPI/MICU COURSE:   Please refer to MICU transfer note for full details. Briefly, this is a 65y F w/ PMHx HTN, HLD, GERD, Asthma presenting as a transfer from [place] ED in setting of complete heart block, was also found to have severe hyponatremia (s/p 100ml 3% NS bolus) and hyperkalemia, PPM placed on 1/10        FOR FOLLOW UP:  [ ] BMP q12h for hyponatremia, f/u renal recs  [ ] Monitor BP for HTN; hydral increased to 25 BID   [ ] PA and lateral CXR in the AM per EP.    Can Hu  PGY3

## 2021-01-11 NOTE — DISCHARGE NOTE PROVIDER - NSDCFUSCHEDAPPT_GEN_ALL_CORE_FT
MATA CARRILLO ; 01/11/2021 ; NPP Rheum [address]72 MATA Ocampo ; 01/21/2021 ; NPJORDIN Cardio Electro [address] Dr MATA CARRILLO ; 01/21/2021 ; NPP Cardio Electro 300 [address]

## 2021-01-11 NOTE — PROGRESS NOTE ADULT - SUBJECTIVE AND OBJECTIVE BOX
[facility] DIVISION OF KIDNEY DISEASES AND HYPERTENSION   -- FOLLOW UP NOTE --   Aleida Sebastian  Nephrology Fellow  Pager NS: [phone]   /  Pager JOSSELIN: [phone]  (after 5pm or weekend please page the on-call fellow)  ======================================================  24 hour events/subjective:  - overnight no events reported, vitals afebrile no hypotensive episode, total UOP 2.8 L in the past 24hr given lasix IV yesterday  - patient seen and examined at bedside this morning without complaints on room air, alert awake oriented x 3  - vitals/lab/medications reviewed, noted for sNa improved from 123 to 134    PAST HISTORY  --------------------------------------------------------------------------------  No significant changes to PMH, PSH, FHx, SHx, unless otherwise noted    ALLERGIES & MEDICATIONS  --------------------------------------------------------------------------------  Allergies  No Known Allergies    Intolerances    Standing Inpatient Medications  amLODIPine   Tablet 5 milliGRAM(s) Oral daily  dextrose 40% Gel 15 Gram(s) Oral once  dextrose 50% Injectable 25 Gram(s) IV Push once  dextrose 50% Injectable 12.5 Gram(s) IV Push once  dextrose 50% Injectable 25 Gram(s) IV Push once  glucagon  Injectable 1 milliGRAM(s) IntraMuscular once  hydrALAZINE 25 milliGRAM(s) Oral two times a day  insulin lispro (ADMELOG) corrective regimen sliding scale   SubCutaneous three times a day before meals  insulin lispro (ADMELOG) corrective regimen sliding scale   SubCutaneous at bedtime    PRN Inpatient Medications  acetaminophen   Tablet .. 650 milliGRAM(s) Oral every 6 hours PRN  melatonin 5 milliGRAM(s) Oral at bedtime PRN    REVIEW OF SYSTEMS  --------------------------------------------------------------------------------  Gen: no fever, chills, weakness  Respiratory: No dyspnea, cough  CV: No chest pain, orthopnea  GI: No abdominal pain, nausea, vomiting, diarrhea  MSK: no edema  Neuro: No dizziness, lightheadedness  Heme: No bleeding  All other systems were reviewed and are negative, except as noted.    VITALS/PHYSICAL EXAM  --------------------------------------------------------------------------------  T(C): 37 (01-11-21 @ 09:47), Max: 37.1 (01-10-21 @ 22:55)  HR: 107 (01-11-21 @ 09:47) (75 - 107)  BP: 125/78 (01-11-21 @ 09:47) (115/58 - 162/70)  RR: 19 (01-11-21 @ 09:47) (12 - 24)  SpO2: 97% (01-11-21 @ 09:47) (94% - 99%)  Wt(kg): --  Height (cm): 152.4 (01-10-21 @ 11:22)  Weight (kg): 65.7 (01-10-21 @ 11:22)  BMI (kg/m2): 28.3 (01-10-21 @ 11:22)  BSA (m2): 1.63 (01-10-21 @ 11:22)    01-10-21 @ 07:01  -  01-11-21 @ 07:00  --------------------------------------------------------  IN: 1130 mL / OUT: 2920 mL / NET: -1790 mL    01-11-21 @ 07:01  -  01-11-21 @ 11:07  --------------------------------------------------------  IN: 350 mL / OUT: 0 mL / NET: 350 mL    Physical Exam:  	Gen: NAD, well-appearing on room air  	HEENT: moist mucous membrane  	Pulm: CTA B/L, no crackles  	CV: RRR, S1S2; no rub/murmur  	GI: +BS, soft, nontender/nondistended  	: No suprapubic tenderness  	MSK: Warm, ankle edema b/l               Neuro: AAOx3  	Psych: Normal affect and mood  	Skin: Warm    LABS/STUDIES  --------------------------------------------------------------------------------              11.9   9.89  >-----------<  172      [01-11-21 @ 05:21]              34.9     134  |  97  |  17  ----------------------------<  160      [01-11-21 @ 05:21]  3.7   |  29  |  0.72        Ca     8.6     [01-11-21 @ 05:21]      Mg     1.9     [01-11-21 @ 05:21]      Phos  2.4     [01-11-21 @ 05:21]    TPro  6.2  /  Alb  3.3  /  TBili  0.9  /  DBili  x   /  AST  27  /  ALT  42  /  AlkPhos  68  [01-11-21 @ 05:21]    PT/INR: PT 13.9 , INR 1.17       [01-10-21 @ 05:10]  PTT: 37.3       [01-10-21 @ 05:10]      Creatinine Trend:  SCr 0.72 [01-11 @ 05:21]  SCr 0.85 [01-10 @ 15:18]  SCr 0.91 [01-10 @ 05:10]  SCr 1.01 [01-09 @ 21:02]  SCr 1.02 [01-09 @ 15:00]      Urine Creatinine 119      [01-08-21 @ 12:48]  Urine Sodium <35      [01-08-21 @ 12:48]  Urine Osmolality 285      [01-09-21 @ 15:22]    HbA1c 6.5      [12-03-18 @ 22:38]  TSH 1.57      [01-08-21 @ 13:11]  Lipid: chol 80, TG 89, HDL 36, LDL --      [01-08-21 @ 18:24]    HCV 0.09, Nonreact      [01-09-21 @ 12:17]

## 2021-01-11 NOTE — PROGRESS NOTE ADULT - ATTENDING COMMENTS
The patient responded to hypertonic saline and one dose of lasix. Now on fluid restriction for SIADH. Repeat sodium this afternoon. Consider second dose lasix at that time. PPM today with Dr. Lieberman. Amlodipine and hydralazine added for BP control.
The patient is seen and examined with team. Patient 100% paced with temporary wire. PPM tomorrow. Sodium improved with hypertonic saline. K improved with Lokelma. Mental status markedly improved this morning. Alert and oriented no longer requiring 1:1 observation. Renal following for hyponatremia thought to be SIADH. Diuresed after one dose lasix.
#Hyponatremia- hypervolemic-  sodium improving  lasix per cv  #bradycardia- s/p PM today  #hyperkalemia- resolved
#Hyponatremia- hypervolemic-  sodium improving  okay to put on 40mg IV bid if no improvement in sodium  #bradycardia- for PM today  #hyperkalemia- resolved

## 2021-01-11 NOTE — CHART NOTE - NSCHARTNOTESELECT_GEN_ALL_CORE
Pacemaker Implantation Report
MAR accept note/Event Note
Medicine follow up/Event Note
Nephrology update/Event Note
Transfer Note

## 2021-01-11 NOTE — DISCHARGE NOTE PROVIDER - NSDCMRMEDTOKEN_GEN_ALL_CORE_FT
acetaminophen 325 mg oral tablet: 2 tab(s) orally every 6 hours, As needed, Mild Pain (1 - 3), Moderate Pain (4 - 6)  Albuterol (Eqv-ProAir HFA) 90 mcg/inh inhalation aerosol: 2 puff(s) inhaled every 6 hours  amLODIPine 5 mg oral tablet: 1 tab(s) orally once a day  Aspir 81 oral delayed release tablet: 1 tab(s) orally once a day  atorvastatin 10 mg oral tablet: 1 tab(s) orally once a day  famotidine 20 mg oral tablet: 1 tab(s) orally once a day, in PM  gabapentin 300 mg oral capsule: 1 cap(s) orally 3 times a day  glipiZIDE 10 mg oral tablet: 1 tab(s) orally 2 times a day  losartan 100 mg oral tablet: 1 tab(s) orally once a day, in AM  oxybutynin 10 mg/24 hr oral tablet, extended release: 1 tab(s) orally once a day, in PM  Toprol- mg oral tablet, extended release: 1 tab(s) orally once a day

## 2021-01-11 NOTE — PROGRESS NOTE ADULT - ASSESSMENT
65F PMHx HTN, HLD, GERD, Asthma who was transferred from [place] ED to [facility] for complete heart block. Nephrology consulted for hyponatremia (117).      # Hyponatremia - improving  Pt with hyponatremia hypervolemia likely 2/2 heart failure from complete heart block causing high ADH and/or low solute intake (tea and toast diet) or  possible SIADH in setting nausea/vomiting, oxybutynin.  On admission serum Na 117 on 1/8/21 (prior sNa 144 on 12/22/20) dropped to 115 after NS bolus consistent w/ high ADH state.  Labs noted for Uosm 395, sOsm 268, Atul<35 consistent with heart failure etiology. s/p pacemaker 1/10/21. Pt given hypertonic saline 3% and lasix IV on 1/9/21 with improve sNa from 115 (on 1/8/21) to 119 to 121 to 123 --> today sNa 134 mEq/dL    Recommendations:   - sNa improving to 134 today, no objection from nephrology standpoint for discharge.  Patient will need to get repeat BMP this week with PMD to assess serum Na trend.  If serum Na worsens can call nephrology clinic - [phone].  - diuretic per cardiology, glycemic control 65F PMHx HTN, HLD, GERD, Asthma who was transferred from [place] ED to [facility] for complete heart block. Nephrology consulted for hyponatremia (117).      # Hyponatremia - improving  Pt with hyponatremia hypervolemia likely 2/2 heart failure from complete heart block causing high ADH and/or low solute intake (tea and toast diet) or  possible SIADH in setting nausea/vomiting, oxybutynin.  On admission serum Na 117 on 1/8/21 (prior sNa 144 on 12/22/20) dropped to 115 after NS bolus consistent w/ high ADH state.  Labs noted for Uosm 395, sOsm 268, Atul<35 consistent with heart failure etiology. s/p pacemaker 1/10/21. Pt given hypertonic saline 3% on 1/8/21 and lasix IV on 1/9/21 with improve sNa from 115 (on 1/8/21) to 119 to 121 to 123 --> today sNa 134 mEq/dL    Recommendations:   - sNa improving to 134 today, no objection from nephrology standpoint for discharge.  Patient will need to get repeat BMP this week with PMD to assess serum Na trend.  If serum Na worsens can call nephrology clinic - [phone].  - diuretic per cardiology, glycemic control

## 2021-01-11 NOTE — DISCHARGE NOTE PROVIDER - NSDCCPCAREPLAN_GEN_ALL_CORE_FT
PRINCIPAL DISCHARGE DIAGNOSIS  Diagnosis: Complete heart block  Assessment and Plan of Treatment: s/p PPM   Luisana at which point the staples will be removed- Wound check scheduled for 1/21/2021 at 11:40AM - See HIAURELIO for details  Discharge instructions:   Cardiac Device Implant Post Operative Instructions  - Do not touch the incision until it is completely healed.   - There are staples on your incision, Do not pick at staples.   - Bruising around the implant site or over the chest, side or arm near the incision is normal, and will take a few weeks to resolve.  -Do not lift the affected arm higher than 90 degrees (shoulder height) in any direction for 4 weeks. do not swim or soak in bath tubs, hot tubs, swimming pools, etc.   You should call the doctor if:   - You notice redness, drainage, swelling, increased tenderness, hot sensation around the incision, bleeding or incision edges pulling apart.  - Do not push, pull or lift anything heavier than 10 lbs (about a gallon of milk) with the affected arm for 4 weeks.     - Do not apply soaps, creams, lotions, ointments or powders to the incision until it is completely healed.  - You may take a shower in 24 hours, and allow the water to run over the incision. However, do not submerge the incision in water:   - Your temperature is greater than 100 degrees F for more than 24 hours.  - You notice swelling or bulging at the incision or around the device that was not there when you left the hospital or is increasing in size.  - You experience increased difficulty breathing.  - You notice new/worsening swelling in your legs and ankles.  - You faint or have dizzy spells.  - You have any questions or concerns regarding your device or the procedure.        SECONDARY DISCHARGE DIAGNOSES  Diagnosis: Hyponatremia  Assessment and Plan of Treatment: Improved: Na+ 133   Follow-up with your PCP for repeat blood BMP in 1 week--call for appointment.    Diagnosis: HTN (hypertension)  Assessment and Plan of Treatment: Low salt diet  Activity as tolerated.  Take all medication as prescribed.  Follow up with your medical doctor for routine blood pressure monitoring at your next visit.  Notify your doctor if you have any of the following symptoms:   Dizziness, Lightheadedness, Blurry vision, Headache, Chest pain, Shortness of breath

## 2021-01-11 NOTE — DISCHARGE NOTE NURSING/CASE MANAGEMENT/SOCIAL WORK - NSDCPNINST_GEN_ALL_CORE
call MD // go to ER:  temperature, nausea, vomiting; check site daily for redness, warmth, swelling, bleeding, drainage with foul odor

## 2021-01-11 NOTE — PROGRESS NOTE ADULT - PROVIDER SPECIALTY LIST ADULT
Critical Care
Critical Care
IVAN
IVAN
Nephrology
Electrophysiology
Critical Care
Nephrology
Nephrology

## 2021-01-11 NOTE — DISCHARGE NOTE PROVIDER - NSDCFUADDAPPT_GEN_ALL_CORE_FT
Discharge instructions:   Cardiac Device Implant Post Operative Instructions  - Do not touch the incision until it is completely healed.   - There are staples on your incision, Do not pick at staples.   - Bruising around the implant site or over the chest, side or arm near the incision is normal, and will take a few weeks to resolve.  -Do not lift the affected arm higher than 90 degrees (shoulder height) in any direction for 4 weeks.   - Do not push, pull or lift anything heavier than 10 lbs (about a gallon of milk) with the affected arm for 4 weeks.     - Do not apply soaps, creams, lotions, ointments or powders to the incision until it is completely healed.  - You may take a shower in 24 hours, and allow the water to run over the incision. However, do not submerge the incision in water: do not swim or soak in bath tubs, hot tubs, swimming pools, etc.   You should call the doctor if:   - You notice redness, drainage, swelling, increased tenderness, hot sensation around the incision, bleeding or incision edges pulling apart.  - Your temperature is greater than 100 degrees F for more than 24 hours.  - You notice swelling or bulging at the incision or around the device that was not there when you left the hospital or is increasing in size.  - You experience increased difficulty breathing.  - You notice new/worsening swelling in your legs and ankles.  - You faint or have dizzy spells.  - You have any questions or concerns regarding your device or the procedure.

## 2021-01-11 NOTE — PROGRESS NOTE ADULT - SUBJECTIVE AND OBJECTIVE BOX
Patient seen today in chair OOB eating breakfast. No overnight complaints. Patient very thankful. Pressure dressing removed from LACW without complication. Right IJ dressing removed without complication. Device interrogation reviewed and performed. Excellent dual chamber Medtronic pacemaker function. Patient is pacemaker dependent. No escape. Sinus rates in the 100-110 range    EKG: not performed  TELE: Not on telemetry    MEDICATIONS  (STANDING):  amLODIPine   Tablet 5 milliGRAM(s) Oral daily  dextrose 40% Gel 15 Gram(s) Oral once  dextrose 50% Injectable 25 Gram(s) IV Push once  dextrose 50% Injectable 12.5 Gram(s) IV Push once  dextrose 50% Injectable 25 Gram(s) IV Push once  glucagon  Injectable 1 milliGRAM(s) IntraMuscular once  hydrALAZINE 25 milliGRAM(s) Oral two times a day  insulin lispro (ADMELOG) corrective regimen sliding scale   SubCutaneous three times a day before meals  insulin lispro (ADMELOG) corrective regimen sliding scale   SubCutaneous at bedtime    MEDICATIONS  (PRN):  acetaminophen   Tablet .. 650 milliGRAM(s) Oral every 6 hours PRN Mild Pain (1 - 3), Moderate Pain (4 - 6)  melatonin 5 milliGRAM(s) Oral at bedtime PRN Sleep    Allergies  No Known Allergies    PAST MEDICAL & SURGICAL HISTORY:  Asthma  GERD (gastroesophageal reflux disease)  HLD (hyperlipidemia)  HTN (hypertension)  Calculus of gallbladder without cholecystitis without obstruction  GERD (gastroesophageal reflux disease)  Asthma  Exacerbates in the winter  DM2 (diabetes mellitus, type 2)  HLD (hyperlipidemia)  HTN (hypertension)    Vital Signs Last 24 Hrs  T(C): 36.5 (11 Jan 2021 05:31), Max: 37.1 (10 Wilson 2021 22:55)  T(F): 97.7 (11 Jan 2021 05:31), Max: 98.7 (10 Wilson 2021 22:55)  HR: 100 (11 Jan 2021 05:31) (75 - 100)  BP: 157/83 (11 Jan 2021 05:31) (115/58 - 162/70)  BP(mean): 92 (11 Jan 2021 02:36) (68 - 104)  RR: 19 (11 Jan 2021 05:31) (12 - 24)  SpO2: 94% (11 Jan 2021 05:31) (94% - 99%)    Physical Exam:  Constitutional: NAD, AAOx3  Cardiovascular: +S1S2 RRR  LACW: No pocket hematoma. Suture line with staples clean dry and intact.   Pulmonary: CTA b/l, unlabored  GI: soft NTND +BS  Extremities: no pedal edema,   Neuro: non focal, SIMON x4    LABS:                        11.9   9.89  )-----------( 172      ( 11 Jan 2021 05:21 )             34.9     134<L>  |  97  |  17  ----------------------------<  160<H>  3.7   |  29  |  0.72  Ca    8.6      11 Jan 2021 05:21  Phos  2.4     01-11  Mg     1.9     01-11  TPro  6.2  /  Alb  3.3  /  TBili  0.9  /  DBili  x   /  AST  27  /  ALT  42  /  AlkPhos  68  01-11  PT/INR - ( 10 Wilson 2021 05:10 )   PT: 13.9 sec;   INR: 1.17 ratio    PTT - ( 10 Wilson 2021 05:10 )  PTT:37.3 sec    A/P  65 year old female patient with a history of HTN and DM admitted with CHB (baseline RBBB/LPHB conduction) who is POD#1 s/p successful Medtronic dual chamber pacemaker implantation.    Sinus rates in the  range with appropriate RV pacing.    - PA/LAT CXR pending  - EKG pending  - Recommend restarting antihypertensive medications including beta blocker  - Wound check in two weeks time with Dr. Lieberman at which point the staples will be removed  - Hyponatremia improving  - Stop SC Heparin due to risk of pocket hematoma. Venodyne boots bilateral for DVT prophylaxis.  - No obstruction to discharge home from EP perspective once above EKG and CXR performed.     Discharge instructions:   Cardiac Device Implant Post Operative Instructions  - Do not touch the incision until it is completely healed.   - There are staples on your incision, Do not pick at staples.   - Bruising around the implant site or over the chest, side or arm near the incision is normal, and will take a few weeks to resolve.  -Do not lift the affected arm higher than 90 degrees (shoulder height) in any direction for 4 weeks.   - Do not push, pull or lift anything heavier than 10 lbs (about a gallon of milk) with the affected arm for 4 weeks.     - Do not apply soaps, creams, lotions, ointments or powders to the incision until it is completely healed.  - You may take a shower in 24 hours, and allow the water to run over the incision. However, do not submerge the incision in water: do not swim or soak in bath tubs, hot tubs, swimming pools, etc.   You should call the doctor if:   - You notice redness, drainage, swelling, increased tenderness, hot sensation around the incision, bleeding or incision edges pulling apart.  - Your temperature is greater than 100 degrees F for more than 24 hours.  - You notice swelling or bulging at the incision or around the device that was not there when you left the hospital or is increasing in size.  - You experience increased difficulty breathing.  - You notice new/worsening swelling in your legs and ankles.  - You faint or have dizzy spells.  - You have any questions or concerns regarding your device or the procedure.    Nils Barnes, PAC  [phone]     Patient seen today in chair OOB eating breakfast. No overnight complaints. Patient very thankful. Pressure dressing removed from LACW without complication. Right IJ dressing removed without complication. Device interrogation reviewed and performed. Excellent dual chamber Medtronic pacemaker function. Patient is pacemaker dependent. No escape. Sinus rates in the 100-110 range    EKG: not performed  TELE: Not on telemetry    MEDICATIONS  (STANDING):  amLODIPine   Tablet 5 milliGRAM(s) Oral daily  dextrose 40% Gel 15 Gram(s) Oral once  dextrose 50% Injectable 25 Gram(s) IV Push once  dextrose 50% Injectable 12.5 Gram(s) IV Push once  dextrose 50% Injectable 25 Gram(s) IV Push once  glucagon  Injectable 1 milliGRAM(s) IntraMuscular once  hydrALAZINE 25 milliGRAM(s) Oral two times a day  insulin lispro (ADMELOG) corrective regimen sliding scale   SubCutaneous three times a day before meals  insulin lispro (ADMELOG) corrective regimen sliding scale   SubCutaneous at bedtime    MEDICATIONS  (PRN):  acetaminophen   Tablet .. 650 milliGRAM(s) Oral every 6 hours PRN Mild Pain (1 - 3), Moderate Pain (4 - 6)  melatonin 5 milliGRAM(s) Oral at bedtime PRN Sleep    Allergies  No Known Allergies    PAST MEDICAL & SURGICAL HISTORY:  Asthma  GERD (gastroesophageal reflux disease)  HLD (hyperlipidemia)  HTN (hypertension)  Calculus of gallbladder without cholecystitis without obstruction  GERD (gastroesophageal reflux disease)  Asthma  Exacerbates in the winter  DM2 (diabetes mellitus, type 2)  HLD (hyperlipidemia)  HTN (hypertension)    Vital Signs Last 24 Hrs  T(C): 36.5 (11 Jan 2021 05:31), Max: 37.1 (10 Wilson 2021 22:55)  T(F): 97.7 (11 Jan 2021 05:31), Max: 98.7 (10 Wilson 2021 22:55)  HR: 100 (11 Jan 2021 05:31) (75 - 100)  BP: 157/83 (11 Jan 2021 05:31) (115/58 - 162/70)  BP(mean): 92 (11 Jan 2021 02:36) (68 - 104)  RR: 19 (11 Jan 2021 05:31) (12 - 24)  SpO2: 94% (11 Jan 2021 05:31) (94% - 99%)    Physical Exam:  Constitutional: NAD, AAOx3  Cardiovascular: +S1S2 RRR  LACW: No pocket hematoma. Suture line with staples clean dry and intact.   Pulmonary: CTA b/l, unlabored  GI: soft NTND +BS  Extremities: no pedal edema,   Neuro: non focal, SIMON x4    LABS:                        11.9   9.89  )-----------( 172      ( 11 Jan 2021 05:21 )             34.9     134<L>  |  97  |  17  ----------------------------<  160<H>  3.7   |  29  |  0.72  Ca    8.6      11 Jan 2021 05:21  Phos  2.4     01-11  Mg     1.9     01-11  TPro  6.2  /  Alb  3.3  /  TBili  0.9  /  DBili  x   /  AST  27  /  ALT  42  /  AlkPhos  68  01-11  PT/INR - ( 10 Wilson 2021 05:10 )   PT: 13.9 sec;   INR: 1.17 ratio    PTT - ( 10 Wilson 2021 05:10 )  PTT:37.3 sec    A/P  65 year old female patient with a history of HTN and DM admitted with CHB (baseline RBBB/LPHB conduction) who is POD#1 s/p successful Medtronic dual chamber pacemaker implantation.    Sinus rates in the  range with appropriate RV pacing.    - PA/LAT CXR pending  - EKG pending  - Recommend restarting antihypertensive medications including beta blocker  - Wound check in two weeks time with Dr. Lieberman at which point the staples will be removed  - Hyponatremia improving  - Stop SC Heparin due to risk of pocket hematoma. Venodyne boots bilateral for DVT prophylaxis.  - No obstruction to discharge home from EP perspective once above EKG and CXR performed.   - Wound check scheduled for 1/21/2021 at 11:40AM - See [facility] for details    Discharge instructions:   Cardiac Device Implant Post Operative Instructions  - Do not touch the incision until it is completely healed.   - There are staples on your incision, Do not pick at staples.   - Bruising around the implant site or over the chest, side or arm near the incision is normal, and will take a few weeks to resolve.  -Do not lift the affected arm higher than 90 degrees (shoulder height) in any direction for 4 weeks.   - Do not push, pull or lift anything heavier than 10 lbs (about a gallon of milk) with the affected arm for 4 weeks.     - Do not apply soaps, creams, lotions, ointments or powders to the incision until it is completely healed.  - You may take a shower in 24 hours, and allow the water to run over the incision. However, do not submerge the incision in water: do not swim or soak in bath tubs, hot tubs, swimming pools, etc.   You should call the doctor if:   - You notice redness, drainage, swelling, increased tenderness, hot sensation around the incision, bleeding or incision edges pulling apart.  - Your temperature is greater than 100 degrees F for more than 24 hours.  - You notice swelling or bulging at the incision or around the device that was not there when you left the hospital or is increasing in size.  - You experience increased difficulty breathing.  - You notice new/worsening swelling in your legs and ankles.  - You faint or have dizzy spells.  - You have any questions or concerns regarding your device or the procedure.    Nils Barnes, PAC  [phone]

## 2021-01-11 NOTE — DISCHARGE NOTE PROVIDER - HOSPITAL COURSE
65 year old female patient with a history of HTN and DM admitted with CHB (baseline RBBB/LPHB conduction) who is POD#1 s/p successful Medtronic dual chamber pacemaker implantation.  Sinus rates in the  range with appropriate RV pacing.. Repeat CXR/EKG done. Patient cleared for discharge with follow-up as advised. - Wound check scheduled for 1/21/2021 at 11:40AM - See [facility] for details and Dr. Lieberman at which point the staples will be removed ( advised by Dr. Zavaleta to resume all home medications except Hydralazine  and c/e amlodipine 5mg.          65 year old female patient with a history of HTN and DM admitted with CHB (baseline RBBB/LPHB conduction) who is POD#1 s/p successful Medtronic dual chamber pacemaker implantation.  Sinus rates in the  range with appropriate RV pacing.. Repeat CXR/EKG done. Patient cleared for discharge with follow-up as advised. - Wound check scheduled for 1/21/2021 at 11:40AM - See [facility] for details and Dr. Lieberman at which point the staples will be removed ( advised by Dr. Monroe to resume all home medications except Hydralazine  and c/e amlodipine 5mg.

## 2021-01-21 ENCOUNTER — TRANSCRIPTION ENCOUNTER (OUTPATIENT)
Age: 66
End: 2021-01-21

## 2021-01-21 ENCOUNTER — APPOINTMENT (OUTPATIENT)
Dept: ELECTROPHYSIOLOGY | Facility: CLINIC | Age: 66
End: 2021-01-21
Payer: MEDICAID

## 2021-01-21 ENCOUNTER — NON-APPOINTMENT (OUTPATIENT)
Age: 66
End: 2021-01-21

## 2021-01-21 VITALS
SYSTOLIC BLOOD PRESSURE: 141 MMHG | DIASTOLIC BLOOD PRESSURE: 91 MMHG | HEART RATE: 82 BPM | WEIGHT: 133 LBS | OXYGEN SATURATION: 98 % | BODY MASS INDEX: 26.11 KG/M2 | HEIGHT: 60 IN

## 2021-01-21 PROCEDURE — 93280 PM DEVICE PROGR EVAL DUAL: CPT

## 2021-01-21 PROCEDURE — 99024 POSTOP FOLLOW-UP VISIT: CPT

## 2021-01-30 ENCOUNTER — TRANSCRIPTION ENCOUNTER (OUTPATIENT)
Age: 66
End: 2021-01-30

## 2021-02-05 ENCOUNTER — APPOINTMENT (OUTPATIENT)
Dept: ELECTROPHYSIOLOGY | Facility: CLINIC | Age: 66
End: 2021-02-05
Payer: MEDICAID

## 2021-02-05 DIAGNOSIS — T14.8XXD OTHER INJURY OF UNSPECIFIED BODY REGION, SUBSEQUENT ENCOUNTER: ICD-10-CM

## 2021-02-05 PROCEDURE — 93280 PM DEVICE PROGR EVAL DUAL: CPT

## 2021-02-05 PROCEDURE — 99024 POSTOP FOLLOW-UP VISIT: CPT

## 2021-02-10 ENCOUNTER — APPOINTMENT (OUTPATIENT)
Dept: ELECTROPHYSIOLOGY | Facility: CLINIC | Age: 66
End: 2021-02-10
Payer: MEDICAID

## 2021-02-10 VITALS
SYSTOLIC BLOOD PRESSURE: 144 MMHG | DIASTOLIC BLOOD PRESSURE: 82 MMHG | HEART RATE: 77 BPM | HEIGHT: 60 IN | WEIGHT: 133 LBS | OXYGEN SATURATION: 99 % | BODY MASS INDEX: 26.11 KG/M2

## 2021-02-10 PROCEDURE — 99024 POSTOP FOLLOW-UP VISIT: CPT

## 2021-02-16 ENCOUNTER — APPOINTMENT (OUTPATIENT)
Dept: ELECTROPHYSIOLOGY | Facility: CLINIC | Age: 66
End: 2021-02-16
Payer: MEDICAID

## 2021-02-16 VITALS
RESPIRATION RATE: 14 BRPM | OXYGEN SATURATION: 99 % | HEART RATE: 80 BPM | BODY MASS INDEX: 26.11 KG/M2 | HEIGHT: 60 IN | WEIGHT: 133 LBS | DIASTOLIC BLOOD PRESSURE: 74 MMHG | SYSTOLIC BLOOD PRESSURE: 109 MMHG

## 2021-02-16 DIAGNOSIS — I44.2 ATRIOVENTRICULAR BLOCK, COMPLETE: ICD-10-CM

## 2021-02-16 PROCEDURE — 99024 POSTOP FOLLOW-UP VISIT: CPT

## 2021-02-16 NOTE — REASON FOR VISIT
[Follow-Up - Clinic] : a clinic follow-up of [FreeTextEntry2] : wound evaluation s/p MDT dual chamber PPM implant 1/10/21 [Family Member] : family member

## 2021-02-16 NOTE — HISTORY OF PRESENT ILLNESS
[FreeTextEntry1] : 65F w/Medtronic dual chamber pacemaker implanted on 01/10/2020 for complete heart block. PMH of  HTN, HLD, GERD, Asthma. She had initially presented to [facility] ED with complaints of generalized weakness, malaise and vomiting and was found to have been in complete heart block. She was transferred to [facility] with heart rate at 30s with resultant PPM implant. She has been evaluated in the Device Clinic on 1/21/21 and 2/5/21, during which  the left infraclavicular surgical incision demonstrated delayed wound healing.  She has now completed 2 courses of Clindamycin and returns to the Device Clinic today for a follow-up wound evaluation.  Overall, she is feeling well and offers no complaints.

## 2021-02-16 NOTE — DISCUSSION/SUMMARY
[FreeTextEntry1] : The left infraclavicular surgical incision continues to heal slowly, however the amount of drainage has drastically reduced.  Her case was discussed with Dr. Lieberman, who recommended an additional course of antibiotic therapy, however, both the patient and her daughter wish to watch and wait for another week and evaluate the status of the wound then, prior to starting another course of antibiotics.  The wound was cleaned and dressed, steri strips applied.  She will return to the Device Clinic in 1 week for a follow-up evaluation.

## 2021-02-16 NOTE — PHYSICAL EXAM
[FreeTextEntry1] : Left infraclavicular surgical incision noted to be healing slowly, but improved from her previous visits, with a small opening to the lower edge of the incision.  Scant serous drainage noted.  No erythema or swelling present.

## 2021-02-16 NOTE — REVIEW OF SYSTEMS
[Fever] : no fever [Headache] : no headache [Chills] : no chills [Feeling Fatigued] : not feeling fatigued [Earache] : no earache [Shortness Of Breath] : no shortness of breath [Dyspnea on exertion] : not dyspnea during exertion [Chest  Pressure] : no chest pressure [Chest Pain] : no chest pain [Lower Ext Edema] : no extremity edema [Palpitations] : no palpitations [Cough] : no cough [Abdominal Pain] : no abdominal pain [Nausea] : no nausea [Vomiting] : no vomiting [Heartburn] : no heartburn [Change in Appetite] : no change in appetite [Dysuria] : no dysuria [Dysphagia] : no dysphagia [Joint Pain] : no joint pain [Joint Swelling] : no joint swelling [Joint Stiffness] : no joint stiffness [Muscle Cramps] : no muscle cramps [Limb Weakness (Paresis)] : no limb weakness [Dizziness] : no dizziness [Tremor] : no tremor was seen [Convulsions] : no convulsions [Tingling (Paresthesia)] : no tingling [Confusion] : no confusion was observed [Anxiety] : no anxiety [Excessive Thirst] : no polydipsia [Easy Bleeding] : no tendency for easy bleeding

## 2021-02-17 NOTE — HISTORY OF PRESENT ILLNESS
[FreeTextEntry1] : Ms. Robles is a 65 F presenting today for wound status post initiation of antibiotic therapy for wound healing. \par \par She is s/p Medtronic dual chamber pacemaker implanted on 01/10/2020 for complete heart block. She initially presented to the clinic in 01/21/2021 and the wound was noted to be healing well. One week after the last follow up, she noted small opening in the incision site and slight drainage and patient was seen on 02/05/21 for further evaluation.\par \par  L infraclavicular surgical incision site noted with small opening on the L lateral edge of the incision. Light brown serosangiounous drainage noted upon expression. The site is expressed for almost 30 mins until no drainage is noted. No redness or swelling noted around the area. The area is cleaned with betadine and applied bacitracin and steri strips. Clindamycin 300mg TID X5 days commenced for delayed wound healing. She presents today for wound follow up. \par \par The patient reports that there is no spontaneous drainage since last visit. Steri strips are intact. Small opening still noted at the L lateral edge of the incision. Scant light brown serous drainage noted but the drainage is less than the previous visit. She will be completing her antibiotic therapy tomorrow. She denies any c/o fever, SOB,palpitations, lightheadedness or dizziness. \par

## 2021-02-17 NOTE — DISCUSSION/SUMMARY
[FreeTextEntry1] : Will continue the antibiotic therapy for additional 5 days for wound healing. Follow up next week.

## 2021-02-24 ENCOUNTER — NON-APPOINTMENT (OUTPATIENT)
Age: 66
End: 2021-02-24

## 2021-02-24 ENCOUNTER — APPOINTMENT (OUTPATIENT)
Dept: ELECTROPHYSIOLOGY | Facility: CLINIC | Age: 66
End: 2021-02-24
Payer: MEDICAID

## 2021-02-24 VITALS
OXYGEN SATURATION: 98 % | SYSTOLIC BLOOD PRESSURE: 121 MMHG | HEART RATE: 76 BPM | HEIGHT: 60 IN | BODY MASS INDEX: 26.11 KG/M2 | WEIGHT: 133 LBS | DIASTOLIC BLOOD PRESSURE: 78 MMHG

## 2021-02-24 PROCEDURE — 99024 POSTOP FOLLOW-UP VISIT: CPT

## 2021-02-26 NOTE — DISCUSSION/SUMMARY
[FreeTextEntry1] : Her surgical incision site has healed quite well. The opening has closed with surrounding granulating tissue. Slight redness still noted. No drainage or swelling noted. Device interrogation revealed normal function with adequate sensing and pacing thresholds. \par \par As discussed with Dr. Lieberman, we will commence Keflex 500mg BID x5 days. Will follow up in one month.

## 2021-02-26 NOTE — HISTORY OF PRESENT ILLNESS
[FreeTextEntry1] : Ms. Robles is a 65 F presenting today for wound status post initiation of antibiotic therapy for wound healing. \par \par 65F w/Medtronic dual chamber pacemaker implanted on 01/10/2020 for complete heart block. PMH of HTN, HLD, GERD, Asthma. She had initially presented to [place] ED with complaints of generalized weakness, malaise and vomiting and was found to have been in complete heart block. She was transferred to [facility] with heart rate at 30s with resultant PPM implant. \par \par She has been evaluated in the Device Clinic on 1/21/21, 2/5/21 and 02/16/2021, during which the left infraclavicular surgical incision demonstrated delayed wound healing. She has now completed 2 courses of Clindamycin and returns to the Device Clinic today for a follow-up wound evaluation. Overall, she is feeling well and offers no complaints. \par

## 2021-02-26 NOTE — PHYSICAL EXAM
[General Appearance - Well Developed] : well developed [Normal Appearance] : normal appearance [Well Groomed] : well groomed [General Appearance - Well Nourished] : well nourished [No Deformities] : no deformities [General Appearance - In No Acute Distress] : no acute distress [Normal Conjunctiva] : the conjunctiva exhibited no abnormalities [Eyelids - No Xanthelasma] : the eyelids demonstrated no xanthelasmas [Normal Oral Mucosa] : normal oral mucosa [No Oral Pallor] : no oral pallor [No Oral Cyanosis] : no oral cyanosis [Normal Jugular Venous A Waves Present] : normal jugular venous A waves present [Normal Jugular Venous V Waves Present] : normal jugular venous V waves present [No Jugular Venous Evans A Waves] : no jugular venous evans A waves [Respiration, Rhythm And Depth] : normal respiratory rhythm and effort [Exaggerated Use Of Accessory Muscles For Inspiration] : no accessory muscle use [Auscultation Breath Sounds / Voice Sounds] : lungs were clear to auscultation bilaterally [Heart Rate And Rhythm] : heart rate and rhythm were normal [Heart Sounds] : normal S1 and S2 [Murmurs] : no murmurs present [Abdomen Soft] : soft [Abdomen Tenderness] : non-tender [Abdomen Mass (___ Cm)] : no abdominal mass palpated [Abnormal Walk] : normal gait [Gait - Sufficient For Exercise Testing] : the gait was sufficient for exercise testing [Nail Clubbing] : no clubbing of the fingernails [Cyanosis, Localized] : no localized cyanosis [Petechial Hemorrhages (___cm)] : no petechial hemorrhages [Skin Color & Pigmentation] : normal skin color and pigmentation [] : no rash [No Venous Stasis] : no venous stasis [Skin Lesions] : no skin lesions [No Skin Ulcers] : no skin ulcer [No Xanthoma] : no  xanthoma was observed [Oriented To Time, Place, And Person] : oriented to person, place, and time [Affect] : the affect was normal [Mood] : the mood was normal [No Anxiety] : not feeling anxious

## 2021-03-24 ENCOUNTER — APPOINTMENT (OUTPATIENT)
Dept: ELECTROPHYSIOLOGY | Facility: CLINIC | Age: 66
End: 2021-03-24
Payer: MEDICAID

## 2021-03-24 VITALS
DIASTOLIC BLOOD PRESSURE: 75 MMHG | OXYGEN SATURATION: 97 % | BODY MASS INDEX: 23.83 KG/M2 | HEART RATE: 85 BPM | SYSTOLIC BLOOD PRESSURE: 123 MMHG | WEIGHT: 122 LBS

## 2021-03-24 PROCEDURE — 99072 ADDL SUPL MATRL&STAF TM PHE: CPT

## 2021-03-24 PROCEDURE — 93280 PM DEVICE PROGR EVAL DUAL: CPT

## 2021-04-05 ENCOUNTER — APPOINTMENT (OUTPATIENT)
Dept: RHEUMATOLOGY | Facility: CLINIC | Age: 66
End: 2021-04-05

## 2021-04-20 ENCOUNTER — APPOINTMENT (OUTPATIENT)
Dept: ELECTROPHYSIOLOGY | Facility: CLINIC | Age: 66
End: 2021-04-20

## 2021-04-26 ENCOUNTER — APPOINTMENT (OUTPATIENT)
Dept: RHEUMATOLOGY | Facility: CLINIC | Age: 66
End: 2021-04-26

## 2021-06-01 ENCOUNTER — APPOINTMENT (OUTPATIENT)
Dept: GASTROENTEROLOGY | Facility: CLINIC | Age: 66
End: 2021-06-01

## 2021-06-24 ENCOUNTER — NON-APPOINTMENT (OUTPATIENT)
Age: 66
End: 2021-06-24

## 2021-06-24 ENCOUNTER — APPOINTMENT (OUTPATIENT)
Dept: ELECTROPHYSIOLOGY | Facility: CLINIC | Age: 66
End: 2021-06-24
Payer: MEDICAID

## 2021-06-24 PROCEDURE — 93296 REM INTERROG EVL PM/IDS: CPT

## 2021-06-24 PROCEDURE — 93294 REM INTERROG EVL PM/LDLS PM: CPT

## 2021-09-27 ENCOUNTER — NON-APPOINTMENT (OUTPATIENT)
Age: 66
End: 2021-09-27

## 2021-09-27 ENCOUNTER — APPOINTMENT (OUTPATIENT)
Dept: ELECTROPHYSIOLOGY | Facility: CLINIC | Age: 66
End: 2021-09-27
Payer: MEDICAID

## 2021-09-27 VITALS
HEART RATE: 75 BPM | WEIGHT: 124.34 LBS | BODY MASS INDEX: 24.41 KG/M2 | OXYGEN SATURATION: 97 % | HEIGHT: 60 IN | SYSTOLIC BLOOD PRESSURE: 124 MMHG | DIASTOLIC BLOOD PRESSURE: 86 MMHG

## 2021-09-27 PROCEDURE — 93280 PM DEVICE PROGR EVAL DUAL: CPT

## 2021-09-27 PROCEDURE — 93000 ELECTROCARDIOGRAM COMPLETE: CPT | Mod: 59

## 2021-10-18 ENCOUNTER — APPOINTMENT (OUTPATIENT)
Dept: OTOLARYNGOLOGY | Facility: CLINIC | Age: 66
End: 2021-10-18

## 2021-12-28 ENCOUNTER — APPOINTMENT (OUTPATIENT)
Dept: ELECTROPHYSIOLOGY | Facility: CLINIC | Age: 66
End: 2021-12-28
Payer: MEDICAID

## 2021-12-28 ENCOUNTER — NON-APPOINTMENT (OUTPATIENT)
Age: 66
End: 2021-12-28

## 2021-12-28 PROCEDURE — 93294 REM INTERROG EVL PM/LDLS PM: CPT | Mod: NC

## 2021-12-28 PROCEDURE — 93296 REM INTERROG EVL PM/IDS: CPT | Mod: NC

## 2022-03-29 ENCOUNTER — APPOINTMENT (OUTPATIENT)
Dept: ELECTROPHYSIOLOGY | Facility: CLINIC | Age: 67
End: 2022-03-29
Payer: MEDICAID

## 2022-03-29 ENCOUNTER — APPOINTMENT (OUTPATIENT)
Dept: RHEUMATOLOGY | Facility: CLINIC | Age: 67
End: 2022-03-29
Payer: MEDICAID

## 2022-03-29 ENCOUNTER — NON-APPOINTMENT (OUTPATIENT)
Age: 67
End: 2022-03-29

## 2022-03-29 VITALS
DIASTOLIC BLOOD PRESSURE: 80 MMHG | WEIGHT: 133 LBS | HEIGHT: 60 IN | TEMPERATURE: 98 F | HEART RATE: 87 BPM | OXYGEN SATURATION: 98 % | BODY MASS INDEX: 26.11 KG/M2 | RESPIRATION RATE: 17 BRPM | SYSTOLIC BLOOD PRESSURE: 140 MMHG

## 2022-03-29 DIAGNOSIS — Z23 ENCOUNTER FOR IMMUNIZATION: ICD-10-CM

## 2022-03-29 DIAGNOSIS — M19.90 UNSPECIFIED OSTEOARTHRITIS, UNSPECIFIED SITE: ICD-10-CM

## 2022-03-29 PROCEDURE — 93296 REM INTERROG EVL PM/IDS: CPT

## 2022-03-29 PROCEDURE — G0009: CPT

## 2022-03-29 PROCEDURE — 93294 REM INTERROG EVL PM/LDLS PM: CPT

## 2022-03-29 PROCEDURE — 99215 OFFICE O/P EST HI 40 MIN: CPT | Mod: 25

## 2022-03-29 PROCEDURE — 90677 PCV20 VACCINE IM: CPT

## 2022-03-29 RX ORDER — CEPHALEXIN 500 MG/1
500 CAPSULE ORAL
Qty: 10 | Refills: 0 | Status: DISCONTINUED | COMMUNITY
Start: 2021-02-24 | End: 2022-03-29

## 2022-03-29 RX ORDER — LOSARTAN POTASSIUM 100 MG/1
100 TABLET, FILM COATED ORAL
Refills: 0 | Status: ACTIVE | COMMUNITY

## 2022-03-29 RX ORDER — CLINDAMYCIN HYDROCHLORIDE 300 MG/1
300 CAPSULE ORAL
Qty: 15 | Refills: 0 | Status: DISCONTINUED | COMMUNITY
Start: 2021-02-05 | End: 2022-03-29

## 2022-03-29 NOTE — PHYSICAL EXAM
[General Appearance - Alert] : alert [General Appearance - In No Acute Distress] : in no acute distress [Sclera] : the sclera and conjunctiva were normal [Outer Ear] : the ears and nose were normal in appearance [Oropharynx] : the oropharynx was normal [Neck Appearance] : the appearance of the neck was normal [Neck Cervical Mass (___cm)] : no neck mass was observed [Jugular Venous Distention Increased] : there was no jugular-venous distention [Thyroid Diffuse Enlargement] : the thyroid was not enlarged [Thyroid Nodule] : there were no palpable thyroid nodules [Auscultation Breath Sounds / Voice Sounds] : lungs were clear to auscultation bilaterally [Heart Rate And Rhythm] : heart rate was normal and rhythm regular [Heart Sounds] : normal S1 and S2 [Heart Sounds Gallop] : no gallops [Murmurs] : no murmurs [Heart Sounds Pericardial Friction Rub] : no pericardial rub [Edema] : there was no peripheral edema [Bowel Sounds] : normal bowel sounds [Abdomen Soft] : soft [Abdomen Tenderness] : non-tender [Abdomen Mass (___ Cm)] : no abdominal mass palpated [Cervical Lymph Nodes Enlarged Posterior Bilaterally] : posterior cervical [Cervical Lymph Nodes Enlarged Anterior Bilaterally] : anterior cervical [Supraclavicular Lymph Nodes Enlarged Bilaterally] : supraclavicular [Skin Color & Pigmentation] : normal skin color and pigmentation [Skin Turgor] : normal skin turgor [] : no rash [No Focal Deficits] : no focal deficits [Oriented To Time, Place, And Person] : oriented to person, place, and time [Impaired Insight] : insight and judgment were intact [Affect] : the affect was normal [FreeTextEntry1] : (+)mild non-tender swelling of B/L 2nd-5th MCP's;  (+)tenderness of multiple B/L PIP's and DIP's;  (+)Heberden's and Lindsey's nodes; B/L shoulders w/ painful decreased ROM in all planes;  B/L ankles w/ (+)swelling, (+)tenderness laterally

## 2022-03-29 NOTE — DATA REVIEWED
[FreeTextEntry1] : x-rays B/L hands:  extensive OA\par x-rays B/L shoulders:  OA;  old fracture of left clavicle\par x-rays B/L knees:  OA (R>L)\par x-rays B/L feet:  small plantar and calcaneal spurs;  (+)soft tissue swelling

## 2022-03-29 NOTE — ASSESSMENT
[FreeTextEntry1] : 67 year old female presents with polyarticular joint pains, currently worst in her neck and shoulders / upper arms.  \par Her presentation is suggestive of an overlap of PMR and OA.\par   - Start prednisone 15mg daily.\par   - Check labs.\par   - Discussed importance of exercise\par   - ibuprofen and/or Tylenol prn\par   - warm compresses\par   - OTC topical analgesics\par

## 2022-03-29 NOTE — HISTORY OF PRESENT ILLNESS
[Arthralgias] : arthralgias [FreeTextEntry1] : Joint pains worse since last visit - pain is constant, though worse at rest, gladys at nights.  (+)swelling.  (+)AM stiffness x 3-5 minutes. [Anorexia] : no anorexia [Weight Loss] : no weight loss [Malaise] : no malaise [Fever] : no fever [Chills] : no chills [Fatigue] : no fatigue [Malar Facial Rash] : no malar facial rash [Skin Lesions] : no lesions [Skin Nodules] : no skin nodules [Oral Ulcers] : no oral ulcers [Cough] : no cough [Dry Mouth] : no dry mouth [Dysphonia] : no dysphonia [Dysphagia] : no dysphagia [Shortness of Breath] : no shortness of breath [Chest Pain] : no chest pain [Joint Swelling] : no joint swelling [Joint Warmth] : no joint warmth [Joint Deformity] : no joint deformity [Decreased ROM] : no decreased range of motion [Morning Stiffness] : no morning stiffness [Falls] : no falls [Dyspnea] : no dyspnea [Myalgias] : no myalgias [Muscle Weakness] : no muscle weakness [Muscle Spasms] : no muscle spasms [Muscle Cramping] : no muscle cramping [Visual Changes] : no visual changes [Eye Pain] : no eye pain [Eye Redness] : no eye redness [Dry Eyes] : no dry eyes

## 2022-03-30 LAB
ALBUMIN SERPL ELPH-MCNC: 4.2 G/DL
ALP BLD-CCNC: 69 U/L
ALT SERPL-CCNC: 15 U/L
ANA SER IF-ACNC: NEGATIVE
ANION GAP SERPL CALC-SCNC: 15 MMOL/L
AST SERPL-CCNC: 21 U/L
BASOPHILS # BLD AUTO: 0.07 K/UL
BASOPHILS NFR BLD AUTO: 0.8 %
BILIRUB SERPL-MCNC: 0.5 MG/DL
BUN SERPL-MCNC: 14 MG/DL
CALCIUM SERPL-MCNC: 9.2 MG/DL
CCP AB SER IA-ACNC: <8 UNITS
CHLORIDE SERPL-SCNC: 102 MMOL/L
CO2 SERPL-SCNC: 22 MMOL/L
CREAT SERPL-MCNC: 0.6 MG/DL
CRP SERPL-MCNC: 18 MG/L
EGFR: 97 ML/MIN/1.73M2
EOSINOPHIL # BLD AUTO: 0.26 K/UL
EOSINOPHIL NFR BLD AUTO: 3.1 %
ERYTHROCYTE [SEDIMENTATION RATE] IN BLOOD BY WESTERGREN METHOD: 71 MM/HR
GLUCOSE SERPL-MCNC: 153 MG/DL
HCT VFR BLD CALC: 43.4 %
HGB BLD-MCNC: 13.6 G/DL
IMM GRANULOCYTES NFR BLD AUTO: 0.2 %
LYMPHOCYTES # BLD AUTO: 2.51 K/UL
LYMPHOCYTES NFR BLD AUTO: 30.3 %
MAN DIFF?: NORMAL
MCHC RBC-ENTMCNC: 27 PG
MCHC RBC-ENTMCNC: 31.3 GM/DL
MCV RBC AUTO: 86.1 FL
MONOCYTES # BLD AUTO: 0.6 K/UL
MONOCYTES NFR BLD AUTO: 7.2 %
NEUTROPHILS # BLD AUTO: 4.83 K/UL
NEUTROPHILS NFR BLD AUTO: 58.4 %
PLATELET # BLD AUTO: 226 K/UL
POTASSIUM SERPL-SCNC: 4 MMOL/L
PROT SERPL-MCNC: 7.8 G/DL
RBC # BLD: 5.04 M/UL
RBC # FLD: 13.2 %
RF+CCP IGG SER-IMP: NEGATIVE
RHEUMATOID FACT SER QL: <10 IU/ML
SODIUM SERPL-SCNC: 139 MMOL/L
WBC # FLD AUTO: 8.29 K/UL

## 2022-03-31 LAB — MAGNESIUM SERPL-MCNC: 2 MG/DL

## 2022-04-06 ENCOUNTER — APPOINTMENT (OUTPATIENT)
Dept: RHEUMATOLOGY | Facility: CLINIC | Age: 67
End: 2022-04-06
Payer: MEDICAID

## 2022-04-06 VITALS
SYSTOLIC BLOOD PRESSURE: 160 MMHG | HEIGHT: 60 IN | DIASTOLIC BLOOD PRESSURE: 86 MMHG | BODY MASS INDEX: 25.13 KG/M2 | TEMPERATURE: 97.7 F | HEART RATE: 80 BPM | OXYGEN SATURATION: 97 % | WEIGHT: 128 LBS

## 2022-04-06 PROCEDURE — 99214 OFFICE O/P EST MOD 30 MIN: CPT

## 2022-04-06 NOTE — HISTORY OF PRESENT ILLNESS
[Arthralgias] : arthralgias [FreeTextEntry1] : Feeling much better since starting prednisone.  Her joint pains have significantly improved, including her shoulders though she continues to experience pain upon attempting to lift her shoulders above her head.  No new complaints. [Anorexia] : no anorexia [Weight Loss] : no weight loss [Malaise] : no malaise [Fever] : no fever [Chills] : no chills [Fatigue] : no fatigue [Malar Facial Rash] : no malar facial rash [Skin Lesions] : no lesions [Skin Nodules] : no skin nodules [Oral Ulcers] : no oral ulcers [Cough] : no cough [Dry Mouth] : no dry mouth [Dysphonia] : no dysphonia [Dysphagia] : no dysphagia [Shortness of Breath] : no shortness of breath [Chest Pain] : no chest pain [Joint Swelling] : no joint swelling [Joint Warmth] : no joint warmth [Joint Deformity] : no joint deformity [Decreased ROM] : no decreased range of motion [Morning Stiffness] : no morning stiffness [Falls] : no falls [Dyspnea] : no dyspnea [Myalgias] : no myalgias [Muscle Weakness] : no muscle weakness [Muscle Spasms] : no muscle spasms [Muscle Cramping] : no muscle cramping [Visual Changes] : no visual changes [Eye Pain] : no eye pain [Eye Redness] : no eye redness [Dry Eyes] : no dry eyes

## 2022-04-06 NOTE — ASSESSMENT
[FreeTextEntry1] : 67 year old female presents with:\par 1) PMR:  much improved on prednisone.  Current B/L shoulder pain more c/w OA.\par   - Cont prednisone 15mg for 2 more weeks, then decrease to 12.5mg daily x 2 weeks, then 10mg daily.\par 2)  OA:\par   - Reiterated importance of exercise\par   - Cont Tylenol prn\par   - warm compresses\par   - OTC topical analgesics\par

## 2022-06-02 ENCOUNTER — APPOINTMENT (OUTPATIENT)
Dept: RHEUMATOLOGY | Facility: CLINIC | Age: 67
End: 2022-06-02
Payer: MEDICAID

## 2022-06-02 VITALS
HEART RATE: 71 BPM | OXYGEN SATURATION: 99 % | WEIGHT: 130 LBS | DIASTOLIC BLOOD PRESSURE: 76 MMHG | SYSTOLIC BLOOD PRESSURE: 122 MMHG | RESPIRATION RATE: 17 BRPM | BODY MASS INDEX: 25.52 KG/M2 | TEMPERATURE: 98 F | HEIGHT: 60 IN

## 2022-06-02 DIAGNOSIS — M79.89 OTHER SPECIFIED SOFT TISSUE DISORDERS: ICD-10-CM

## 2022-06-02 DIAGNOSIS — M35.3 POLYMYALGIA RHEUMATICA: ICD-10-CM

## 2022-06-02 DIAGNOSIS — M79.671 PAIN IN RIGHT FOOT: ICD-10-CM

## 2022-06-02 DIAGNOSIS — Z79.52 LONG TERM (CURRENT) USE OF SYSTEMIC STEROIDS: ICD-10-CM

## 2022-06-02 DIAGNOSIS — R25.2 CRAMP AND SPASM: ICD-10-CM

## 2022-06-02 DIAGNOSIS — M79.672 PAIN IN RIGHT FOOT: ICD-10-CM

## 2022-06-02 DIAGNOSIS — M15.9 POLYOSTEOARTHRITIS, UNSPECIFIED: ICD-10-CM

## 2022-06-02 DIAGNOSIS — R68.2 DRY MOUTH, UNSPECIFIED: ICD-10-CM

## 2022-06-02 PROCEDURE — 99214 OFFICE O/P EST MOD 30 MIN: CPT

## 2022-06-02 RX ORDER — PREDNISONE 5 MG/1
5 TABLET ORAL DAILY
Qty: 90 | Refills: 0 | Status: DISCONTINUED | COMMUNITY
Start: 2022-03-29 | End: 2022-06-02

## 2022-06-02 RX ORDER — PREDNISONE 2.5 MG/1
2.5 TABLET ORAL DAILY
Qty: 30 | Refills: 1 | Status: DISCONTINUED | COMMUNITY
Start: 2022-04-06 | End: 2022-06-02

## 2022-06-02 RX ORDER — PREDNISONE 10 MG/1
10 TABLET ORAL
Qty: 30 | Refills: 0 | Status: ACTIVE | COMMUNITY
Start: 2022-04-06 | End: 1900-01-01

## 2022-06-02 NOTE — ASSESSMENT
[FreeTextEntry1] : 67 year old female presents with:\par 1) PMR:  much improved on prednisone.  Current B/L shoulder pain more c/w OA.\par   - Decrease prednisone to 10mg daily x 1 month then taper by 1mg/day qmonthly.\par   - Calcium / vit D \par   - Check labs.\par   - Order DEXA.\par 2)  Polyarticular joint pains, including B/L hands (PIP's and DIP's), shoulders, knees, feet:  due to OA:\par   - Reiterated importance of exercise\par   - Cont Tylenol prn\par   - warm compresses\par   - OTC topical analgesics\par

## 2022-06-02 NOTE — HISTORY OF PRESENT ILLNESS
[Arthralgias] : arthralgias [FreeTextEntry1] : Continues to feel better.  She has tapered prednisone down to 12.5mg daily, with no recurrence of PMR symptoms.  She c/o mild pain in her B/L hands (primarily in her DIP's), knees, feet/ankles, and lower back.  \par  [Anorexia] : no anorexia [Weight Loss] : no weight loss [Malaise] : no malaise [Fever] : no fever [Chills] : no chills [Fatigue] : no fatigue [Malar Facial Rash] : no malar facial rash [Skin Lesions] : no lesions [Skin Nodules] : no skin nodules [Oral Ulcers] : no oral ulcers [Cough] : no cough [Dry Mouth] : no dry mouth [Dysphonia] : no dysphonia [Dysphagia] : no dysphagia [Shortness of Breath] : no shortness of breath [Chest Pain] : no chest pain [Joint Swelling] : no joint swelling [Joint Warmth] : no joint warmth [Joint Deformity] : no joint deformity [Decreased ROM] : no decreased range of motion [Morning Stiffness] : no morning stiffness [Falls] : no falls [Dyspnea] : no dyspnea [Myalgias] : no myalgias [Muscle Weakness] : no muscle weakness [Muscle Spasms] : no muscle spasms [Muscle Cramping] : no muscle cramping [Visual Changes] : no visual changes [Eye Pain] : no eye pain [Eye Redness] : no eye redness [Dry Eyes] : no dry eyes

## 2022-06-02 NOTE — PHYSICAL EXAM
[General Appearance - Alert] : alert [General Appearance - In No Acute Distress] : in no acute distress [Sclera] : the sclera and conjunctiva were normal [Outer Ear] : the ears and nose were normal in appearance [Oropharynx] : the oropharynx was normal [Neck Appearance] : the appearance of the neck was normal [Neck Cervical Mass (___cm)] : no neck mass was observed [Jugular Venous Distention Increased] : there was no jugular-venous distention [Thyroid Diffuse Enlargement] : the thyroid was not enlarged [Thyroid Nodule] : there were no palpable thyroid nodules [Auscultation Breath Sounds / Voice Sounds] : lungs were clear to auscultation bilaterally [Heart Rate And Rhythm] : heart rate was normal and rhythm regular [Heart Sounds] : normal S1 and S2 [Heart Sounds Gallop] : no gallops [Murmurs] : no murmurs [Heart Sounds Pericardial Friction Rub] : no pericardial rub [Edema] : there was no peripheral edema [Bowel Sounds] : normal bowel sounds [Abdomen Soft] : soft [Abdomen Tenderness] : non-tender [Abdomen Mass (___ Cm)] : no abdominal mass palpated [Cervical Lymph Nodes Enlarged Posterior Bilaterally] : posterior cervical [Cervical Lymph Nodes Enlarged Anterior Bilaterally] : anterior cervical [Supraclavicular Lymph Nodes Enlarged Bilaterally] : supraclavicular [Skin Color & Pigmentation] : normal skin color and pigmentation [Skin Turgor] : normal skin turgor [] : no rash [No Focal Deficits] : no focal deficits [Oriented To Time, Place, And Person] : oriented to person, place, and time [Impaired Insight] : insight and judgment were intact [Affect] : the affect was normal [FreeTextEntry1] : No synovitis;  (+)tenderness of multiple B/L PIP's and DIP's;  (+)Heberden's and Lindsey's nodes; B/L shoulders w/ pain upon internal rotation and external rotation;  B/L ankles w/ (+)swelling, (+)tenderness laterally

## 2022-06-03 ENCOUNTER — APPOINTMENT (OUTPATIENT)
Dept: RHEUMATOLOGY | Facility: CLINIC | Age: 67
End: 2022-06-03

## 2022-06-28 ENCOUNTER — NON-APPOINTMENT (OUTPATIENT)
Age: 67
End: 2022-06-28

## 2022-06-28 ENCOUNTER — APPOINTMENT (OUTPATIENT)
Dept: ELECTROPHYSIOLOGY | Facility: CLINIC | Age: 67
End: 2022-06-28

## 2022-06-28 PROCEDURE — 93294 REM INTERROG EVL PM/LDLS PM: CPT

## 2022-06-28 PROCEDURE — 93296 REM INTERROG EVL PM/IDS: CPT

## 2022-07-05 ENCOUNTER — RX RENEWAL (OUTPATIENT)
Age: 67
End: 2022-07-05

## 2022-07-05 RX ORDER — PREDNISONE 5 MG/1
5 TABLET ORAL
Qty: 30 | Refills: 2 | Status: ACTIVE | COMMUNITY
Start: 2022-07-05 | End: 1900-01-01

## 2022-07-18 NOTE — PACU DISCHARGE NOTE - NAUSEA/VOMITING:
-- DO NOT REPLY / DO NOT REPLY ALL --  -- Message is from the [facility]--    General Patient Message      Reason for Call: Patient is returning a miss call from the office please give patient a call back to further assist.    Caller Information       Type Contact Phone/Fax    07/16/2022 10:24 AM CDT Phone (Incoming) Mel Tellez (Self) [phone] (M)          Alternative phone number: none    Turnaround time given to caller:   \"This message will be sent to [state Provider's name]. The clinical team will fulfill your request as soon as they review your message.\"     None

## 2022-09-02 ENCOUNTER — RX RENEWAL (OUTPATIENT)
Age: 67
End: 2022-09-02

## 2022-09-02 RX ORDER — PREDNISONE 1 MG/1
1 TABLET ORAL
Qty: 90 | Refills: 0 | Status: ACTIVE | COMMUNITY
Start: 2022-07-05 | End: 1900-01-01

## 2022-09-09 ENCOUNTER — APPOINTMENT (OUTPATIENT)
Dept: RHEUMATOLOGY | Facility: CLINIC | Age: 67
End: 2022-09-09

## 2022-09-27 ENCOUNTER — NON-APPOINTMENT (OUTPATIENT)
Age: 67
End: 2022-09-27

## 2022-09-27 ENCOUNTER — APPOINTMENT (OUTPATIENT)
Dept: ELECTROPHYSIOLOGY | Facility: CLINIC | Age: 67
End: 2022-09-27

## 2022-10-11 ENCOUNTER — NON-APPOINTMENT (OUTPATIENT)
Age: 67
End: 2022-10-11

## 2022-10-11 ENCOUNTER — APPOINTMENT (OUTPATIENT)
Dept: ELECTROPHYSIOLOGY | Facility: CLINIC | Age: 67
End: 2022-10-11

## 2022-10-11 PROCEDURE — 93296 REM INTERROG EVL PM/IDS: CPT

## 2022-10-11 PROCEDURE — 93294 REM INTERROG EVL PM/LDLS PM: CPT

## 2023-01-10 ENCOUNTER — APPOINTMENT (OUTPATIENT)
Dept: ELECTROPHYSIOLOGY | Facility: CLINIC | Age: 68
End: 2023-01-10

## 2023-01-17 ENCOUNTER — RX RENEWAL (OUTPATIENT)
Age: 68
End: 2023-01-17

## 2023-01-23 ENCOUNTER — RX RENEWAL (OUTPATIENT)
Age: 68
End: 2023-01-23

## 2023-05-17 ENCOUNTER — NON-APPOINTMENT (OUTPATIENT)
Age: 68
End: 2023-05-17

## 2023-12-15 NOTE — H&P PST ADULT - NS PRO ABUSE SCREEN SUSPICION NEGLECT YN
Pt requesting to leave AMA due to a death in her family. Spoke with Dr Ramirez and he is aware pt is asking to leave. AMA paperwork signed by pt and her daughter. Risks and benefits explained to pt and explained AMA paperwork.    no

## 2023-12-19 NOTE — ED PROVIDER NOTE - GASTROINTESTINAL [+], MLM
[FreeTextEntry1] : Shayla Bernardo is a 69-year-old female with medical history detailed above and active medical issues including:  - Breast CA lumpectomy Jan 2023, completed RT now on chemo  - Dyspnea on exertion, multiple CAD risk factors, abnormal baseline EKG. Coronary CTA and coronary calcium score Sept 2022 nonobstructive coronaries, moderate coronary calcium score 131 total  - Labile hypertension average resting home BPs at guideline goal on amlodipine 5 mg daily.  - History of colon polyps  - Reflux GERD  - Obesity. Discussed calorie reduction and increased exercise as a weight reduction strategy.  - Leg varicose vein laser surgery Dr Wheat  Advised patient to follow active lifestyle with regular cardiovascular exercise. Patient educated on lifestyle and diet modification with low sodium low fat diet and avoidance of excessive alcohol. Patient is aware to call with any symptoms or concerns.  Cardiology follow-up 6 months. Current cardiac medications remain unchanged and renewals are up to date. Repeat labs will be ordered with PMD.  Darlenefrancisca will follow up with Dr. Frank Albrecht for primary care  Total time spent 45 minutes, reviewing of test results, chart information, patient discussion, physical exam and completion of chart documentation. NAUSEA/VOMITING